# Patient Record
Sex: FEMALE | Race: BLACK OR AFRICAN AMERICAN | NOT HISPANIC OR LATINO | Employment: UNEMPLOYED | URBAN - METROPOLITAN AREA
[De-identification: names, ages, dates, MRNs, and addresses within clinical notes are randomized per-mention and may not be internally consistent; named-entity substitution may affect disease eponyms.]

---

## 2017-04-28 ENCOUNTER — OFFICE VISIT (OUTPATIENT)
Dept: INTERNAL MEDICINE | Facility: CLINIC | Age: 57
End: 2017-04-28
Payer: COMMERCIAL

## 2017-04-28 VITALS
DIASTOLIC BLOOD PRESSURE: 65 MMHG | SYSTOLIC BLOOD PRESSURE: 143 MMHG | HEART RATE: 69 BPM | WEIGHT: 194 LBS | OXYGEN SATURATION: 99 % | HEIGHT: 66 IN | TEMPERATURE: 97 F | BODY MASS INDEX: 31.18 KG/M2

## 2017-04-28 DIAGNOSIS — R73.9 HYPERGLYCEMIA: ICD-10-CM

## 2017-04-28 DIAGNOSIS — E78.5 HYPERLIPIDEMIA, UNSPECIFIED HYPERLIPIDEMIA TYPE: ICD-10-CM

## 2017-04-28 DIAGNOSIS — Z00.00 ROUTINE GENERAL MEDICAL EXAMINATION AT A HEALTH CARE FACILITY: Primary | ICD-10-CM

## 2017-04-28 DIAGNOSIS — E55.9 VITAMIN D DEFICIENCY: ICD-10-CM

## 2017-04-28 PROCEDURE — 1160F RVW MEDS BY RX/DR IN RCRD: CPT | Mod: S$GLB,,, | Performed by: INTERNAL MEDICINE

## 2017-04-28 PROCEDURE — 99999 PR PBB SHADOW E&M-NEW PATIENT-LVL III: CPT | Mod: PBBFAC,,, | Performed by: INTERNAL MEDICINE

## 2017-04-28 PROCEDURE — 99204 OFFICE O/P NEW MOD 45 MIN: CPT | Mod: S$GLB,,, | Performed by: INTERNAL MEDICINE

## 2017-04-28 RX ORDER — SIMVASTATIN 20 MG/1
20 TABLET, FILM COATED ORAL NIGHTLY
COMMUNITY
End: 2017-06-05 | Stop reason: SDUPTHER

## 2017-04-28 RX ORDER — METFORMIN HYDROCHLORIDE 500 MG/1
500 TABLET ORAL
Qty: 60 TABLET | Refills: 11 | Status: SHIPPED | OUTPATIENT
Start: 2017-04-28 | End: 2018-01-31 | Stop reason: SDUPTHER

## 2017-04-28 RX ORDER — METFORMIN HYDROCHLORIDE 500 MG/1
500 TABLET ORAL
COMMUNITY
End: 2017-04-28 | Stop reason: SDUPTHER

## 2017-04-28 NOTE — MR AVS SNAPSHOT
[facility]  [address]  [place] [address]  Phone: [phone]                  Pricilla Bower   2017 10:20 AM   Office Visit    Description:  Female : 1960   Provider:  Ramiro Perez MD   Department:  [facility]           Reason for Visit     Establish Care           Diagnoses this Visit        Comments    Routine general medical examination at a health care facility    -  Primary     Hyperglycemia         Hyperlipidemia, unspecified hyperlipidemia type         Vitamin D deficiency                To Do List           Future Appointments        Provider Department Dept Phone    2017 1:00 PM LABORATORY, [facility]       Goals (5 Years of Data)     None      Follow-Up and Disposition     Return in about 2 months (around 2017), or if symptoms worsen or fail to improve, for FU on breast/PAP.    Follow-up and Disposition History       These Medications        Disp Refills Start End    metformin (GLUCOPHAGE) 500 MG tablet 60 tablet 11 2017     Take 1 tablet (500 mg total) by mouth daily with breakfast. - Oral    [facility] [address] [place], [place] [address] [address] [address]  AT [facility] [address] [address] Ph #: [phone]         [facility]     [facility] Nurse Care Line -  Assistance  Unless otherwise directed by your provider, please contact [facility], our nurse care line that is available for  assistance.     Registered nurses in the [facility] provide: appointment scheduling, clinical advisement, health education, and other advisory services.  Call: [phone] (toll free)               Medications           Message regarding Medications     Verify the changes and/or additions to your medication regime listed below are the same as discussed with your clinician today.  If any of these changes or additions are incorrect, please notify your healthcare provider.       "  START taking these NEW medications        Refills    metformin (GLUCOPHAGE) 500 MG tablet 11    Sig: Take 1 tablet (500 mg total) by mouth daily with breakfast.    Class: Normal    Route: Oral           Verify that the below list of medications is an accurate representation of the medications you are currently taking.  If none reported, the list may be blank. If incorrect, please contact your healthcare provider. Carry this list with you in case of emergency.           Current Medications     metformin (GLUCOPHAGE) 500 MG tablet Take 1 tablet (500 mg total) by mouth daily with breakfast.    simvastatin (ZOCOR) 20 MG tablet Take 20 mg by mouth every evening.           Clinical Reference Information           Your Vitals Were     BP Pulse Temp Height Weight SpO2    143/65 (BP Location: Left arm, Patient Position: Sitting, BP Method: Manual) 69 97.4 °F (36.3 °C) (Tympanic) 5' 6" (1.676 m) 88 kg (194 lb 0.1 oz) 99%    BMI                31.31 kg/m2          Blood Pressure          Most Recent Value    BP  (!)  143/65      Allergies as of 4/28/2017     Darvocet A500 [Propoxyphene N-acetaminophen]      Immunizations Administered on Date of Encounter - 4/28/2017     None      Orders Placed During Today's Visit     Future Labs/Procedures Expected by Expires    CBC auto differential  4/28/2017 6/27/2018    Comprehensive metabolic panel  4/28/2017 6/27/2018    Hemoglobin A1c  4/28/2017 6/27/2018    Hepatitis C antibody  4/28/2017 6/27/2018    Lipid panel  4/28/2017 6/27/2018    T4, free  4/28/2017 6/27/2018    TSH  4/28/2017 6/27/2018    Vitamin D  4/28/2017 6/27/2018      [facility] Sign-Up     Activating your [facility] account is as easy as 1-2-3!     1) Visit [email or web address], select Sign Up Now, enter this activation code and your date of birth, then select Next.  [email or web address]  Expires: 6/12/2017 12:09 PM      2) Create a username and password to use when you visit [facility] in the future and select a security " question in case you lose your password and select Next.    3) Enter your e-mail address and click Sign Up!    Additional Information  If you have questions, please e-mail [email or web address] or call [phone] to talk to our [facility] staff. Remember, [facility] is NOT to be used for urgent needs. For medical emergencies, dial 911.         Language Assistance Services     ATTENTION: Language assistance services are available, free of charge. Please call [phone].      ATENCIÓN: Si habla español, tiene a angeles disposición servicios gratuitos de asistencia lingüística. Llame al [phone].     TEDDY Ý: N?u b?n nói Ti?ng Vi?t, có các d?ch v? h? tr? ngôn ng? mi?n phí dành cho b?n. G?i s? [phone].         [facility] complies with applicable Federal civil rights laws and does not discriminate on the basis of race, color, national origin, age, disability, or sex.

## 2017-04-28 NOTE — PROGRESS NOTES
Subjective:      Patient ID: Pricilla Bower is a 56 y.o. female.    Chief Complaint: Establish Care    HPI  Ms. Bower presents to establish primary care due to being in [place] more than [place].    She reports feeling well.     No problems. She endorses hot flashes, which doesn't bother her. She prefers to avoid HRT.    Past Medical History:   Diagnosis Date    Blood pressure elevated without history of HTN     On low dose BP rx in the past, but was able to stop due to 30 lb weight loss, 20 of which she regained    Hyperglycemia     Obesity (BMI 30.0-34.9)      History reviewed. No pertinent surgical history.    Social History     Social History    Marital status:      Spouse name: N/A    Number of children: N/A    Years of education: N/A     Occupational History    Not on file.     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)    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: 1 plain biscuit w/ grape jelly; Sprite    Lunch: 3p: Salad or ribs from [employer]; Sweetened tea    Dinner: Banana or other fruit; water    Snacks: None    Eats out: 7d/wk    Water: 100 oz/day (started 3 days ago)    PA: None    Goal weight is 170 lbs by 12/31/17         Family History   Problem Relation Age of Onset    Cancer Mother          Current Outpatient Prescriptions:     metformin (GLUCOPHAGE) 500 MG tablet, Take 500 mg by mouth daily with breakfast., Disp: , Rfl:     simvastatin (ZOCOR) 20 MG tablet, Take 20 mg by mouth every evening., Disp: , Rfl:     Review of patient's allergies indicates:   Allergen Reactions    Penicillins      Lab Results   Component Value Date    WBC 5.01 06/15/2006    HGB 12.6  "06/15/2006    HCT 38.1 06/15/2006     06/15/2006    CHOL 232 (H) 06/15/2006    TRIG 182 (H) 06/15/2006    HDL 34.0 (L) 06/15/2006    ALT 44 (H) 08/26/2005    AST 29 08/26/2005     08/26/2005    K 3.7 08/26/2005     08/26/2005    CREATININE 0.9 08/26/2005    BUN 4 (L) 08/26/2005    CO2 24 08/26/2005     BP (!) 143/65 (BP Location: Left arm, Patient Position: Sitting, BP Method: Manual)  Pulse 69  Temp 97.4 °F (36.3 °C) (Tympanic)   Ht 5' 6" (1.676 m)  Wt 88 kg (194 lb 0.1 oz)  SpO2 99%  BMI 31.31 kg/m2    Review of Systems   Constitutional: Negative.   Cardiovascular: Negative.   Respiratory: Negative.   Gastrointestinal: Negative.   Genitourinary: Negative.   Musculoskeletal: Negative.   Derm: Negative.  Allergic/Immunologic: Negative.   Endocrine: Negative.   Hematological: Negative.   Neurological: Negative.   Psychiatric/Behavioral: Negative.     Objective:     Physical Exam  GEN: A&O fully, NAD  PSYC: Normal affect  HEENT: OP: Clear, no LAD, no thyroid masses  CV: RRR, no M/G/R  PULM: CTA bilaterally, no wheezes, rales  GI: S/NT/ND, normal bowel sounds  EXT: No C/C/E  NEURO: CN II-XII intact, 5/5 strength globally, no sensory losses, normal tandem gait, normal Romberg, 2+ DTRs globally  DERM: 0.5 x 1 cm heterogenously pigmented, irregular border, asymetric plaque      Assessment:      1. Routine general medical examination at a health care facility    2. Hyperglycemia    3. Hyperlipidemia, unspecified hyperlipidemia type    4. Vitamin D deficiency      Plan:   Routine general medical examination at a health care facility  -     CBC auto differential; Future; Expected date: 4/28/17  -     Comprehensive metabolic panel; Future; Expected date: 4/28/17  -     T4, free; Future; Expected date: 4/28/17  -     TSH; Future; Expected date: 4/28/17  -     Hepatitis C antibody; Future; Expected date: 4/28/17    Hyperglycemia  -     Hemoglobin A1c; Future; Expected date: 4/28/17    Hyperlipidemia, " unspecified hyperlipidemia type  -     Lipid panel; Future; Expected date: 4/28/17    Vitamin D deficiency  -     Vitamin D; Future; Expected date: 4/28/17    RTC in 2 months for HM (breast/PAP) or sooner as needed.

## 2017-06-05 ENCOUNTER — OFFICE VISIT (OUTPATIENT)
Dept: INTERNAL MEDICINE | Facility: CLINIC | Age: 57
End: 2017-06-05
Payer: COMMERCIAL

## 2017-06-05 ENCOUNTER — LAB VISIT (OUTPATIENT)
Dept: LAB | Facility: HOSPITAL | Age: 57
End: 2017-06-05
Attending: INTERNAL MEDICINE
Payer: COMMERCIAL

## 2017-06-05 VITALS
TEMPERATURE: 99 F | RESPIRATION RATE: 18 BRPM | HEART RATE: 71 BPM | HEIGHT: 66 IN | WEIGHT: 192.88 LBS | OXYGEN SATURATION: 98 % | BODY MASS INDEX: 31 KG/M2 | DIASTOLIC BLOOD PRESSURE: 65 MMHG | SYSTOLIC BLOOD PRESSURE: 138 MMHG

## 2017-06-05 DIAGNOSIS — Z00.00 ROUTINE GENERAL MEDICAL EXAMINATION AT A HEALTH CARE FACILITY: ICD-10-CM

## 2017-06-05 DIAGNOSIS — N93.9 ABNORMAL VAGINAL BLEEDING: Primary | ICD-10-CM

## 2017-06-05 DIAGNOSIS — E78.5 HYPERLIPIDEMIA, UNSPECIFIED HYPERLIPIDEMIA TYPE: ICD-10-CM

## 2017-06-05 DIAGNOSIS — N93.9 ABNORMAL VAGINAL BLEEDING: ICD-10-CM

## 2017-06-05 LAB
25(OH)D3+25(OH)D2 SERPL-MCNC: 16 NG/ML
ALBUMIN SERPL BCP-MCNC: 3.8 G/DL
ALP SERPL-CCNC: 61 U/L
ALT SERPL W/O P-5'-P-CCNC: 25 U/L
ANION GAP SERPL CALC-SCNC: 10 MMOL/L
AST SERPL-CCNC: 18 U/L
BASOPHILS # BLD AUTO: 0.01 K/UL
BASOPHILS NFR BLD: 0.2 %
BILIRUB SERPL-MCNC: 0.3 MG/DL
BUN SERPL-MCNC: 10 MG/DL
CALCIUM SERPL-MCNC: 9.6 MG/DL
CHLORIDE SERPL-SCNC: 103 MMOL/L
CHOLEST/HDLC SERPL: 5.6 {RATIO}
CO2 SERPL-SCNC: 26 MMOL/L
CREAT SERPL-MCNC: 0.9 MG/DL
DIFFERENTIAL METHOD: NORMAL
EOSINOPHIL # BLD AUTO: 0.2 K/UL
EOSINOPHIL NFR BLD: 3 %
ERYTHROCYTE [DISTWIDTH] IN BLOOD BY AUTOMATED COUNT: 13.8 %
EST. GFR  (AFRICAN AMERICAN): >60 ML/MIN/1.73 M^2
EST. GFR  (NON AFRICAN AMERICAN): >60 ML/MIN/1.73 M^2
GLUCOSE SERPL-MCNC: 101 MG/DL
HCT VFR BLD AUTO: 40.1 %
HDL/CHOLESTEROL RATIO: 18 %
HDLC SERPL-MCNC: 222 MG/DL
HDLC SERPL-MCNC: 40 MG/DL
HGB BLD-MCNC: 13.5 G/DL
LDLC SERPL CALC-MCNC: 143.4 MG/DL
LYMPHOCYTES # BLD AUTO: 1.8 K/UL
LYMPHOCYTES NFR BLD: 29.8 %
MCH RBC QN AUTO: 29.9 PG
MCHC RBC AUTO-ENTMCNC: 33.7 %
MCV RBC AUTO: 89 FL
MONOCYTES # BLD AUTO: 0.4 K/UL
MONOCYTES NFR BLD: 6.7 %
NEUTROPHILS # BLD AUTO: 3.6 K/UL
NEUTROPHILS NFR BLD: 60.1 %
NONHDLC SERPL-MCNC: 182 MG/DL
PLATELET # BLD AUTO: 284 K/UL
PMV BLD AUTO: 10.5 FL
POTASSIUM SERPL-SCNC: 3.8 MMOL/L
PROT SERPL-MCNC: 7.7 G/DL
RBC # BLD AUTO: 4.51 M/UL
SODIUM SERPL-SCNC: 139 MMOL/L
TRIGL SERPL-MCNC: 193 MG/DL
TSH SERPL DL<=0.005 MIU/L-ACNC: 1.31 UIU/ML
WBC # BLD AUTO: 5.93 K/UL

## 2017-06-05 PROCEDURE — 85025 COMPLETE CBC W/AUTO DIFF WBC: CPT

## 2017-06-05 PROCEDURE — 80061 LIPID PANEL: CPT

## 2017-06-05 PROCEDURE — 80053 COMPREHEN METABOLIC PANEL: CPT

## 2017-06-05 PROCEDURE — 84443 ASSAY THYROID STIM HORMONE: CPT

## 2017-06-05 PROCEDURE — 88141 CYTOPATH C/V INTERPRET: CPT | Mod: ,,, | Performed by: PATHOLOGY

## 2017-06-05 PROCEDURE — 88175 CYTOPATH C/V AUTO FLUID REDO: CPT | Performed by: PATHOLOGY

## 2017-06-05 PROCEDURE — 82306 VITAMIN D 25 HYDROXY: CPT

## 2017-06-05 PROCEDURE — 86803 HEPATITIS C AB TEST: CPT

## 2017-06-05 PROCEDURE — 99999 PR PBB SHADOW E&M-EST. PATIENT-LVL III: CPT | Mod: PBBFAC,,, | Performed by: INTERNAL MEDICINE

## 2017-06-05 PROCEDURE — 36415 COLL VENOUS BLD VENIPUNCTURE: CPT | Mod: PO

## 2017-06-05 PROCEDURE — 99214 OFFICE O/P EST MOD 30 MIN: CPT | Mod: S$GLB,,, | Performed by: INTERNAL MEDICINE

## 2017-06-05 RX ORDER — SIMVASTATIN 20 MG/1
20 TABLET, FILM COATED ORAL NIGHTLY
Qty: 90 TABLET | Refills: 3 | Status: SHIPPED | OUTPATIENT
Start: 2017-06-05 | End: 2017-09-19 | Stop reason: DRUGHIGH

## 2017-06-05 NOTE — PROGRESS NOTES
Subjective:      Patient ID: Pricilla Bower is a 57 y.o. female.    Chief Complaint: Vaginal Bleeding    Vaginal Bleeding       Ms. Bower is a patient of mine, who presents for vaginal bleeding, which started in May with one episode of spotting, which resolved until last night. She reports having light bleeding since, but had gone through menopause ~53 years. Of note, she reports her mother had vaginal and metastatic breast cancer despite not smoking or drinking.     Past Medical History:   Diagnosis Date    Blood pressure elevated without history of HTN     On low dose BP rx in the past, but was able to stop due to 30 lb weight loss, 20 of which she regained    Hyperglycemia     Obesity (BMI 30.0-34.9)      History reviewed. No pertinent surgical history.  Social History     Social History    Marital status:      Spouse name: N/A    Number of children: N/A    Years of education: N/A     Occupational History    Not on file.     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)    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: 1 plain biscuit w/ grape jelly; Sprite    Lunch: 3p: Salad or ribs from [employer]; Sweetened tea    Dinner: Banana or other fruit; water    Snacks: None    Eats out: 7d/wk    Water: 100 oz/day (started 3 days ago)    PA: None    Goal weight is 170 lbs by 12/31/17         Family History   Problem Relation Age of Onset    Cancer Mother        Current Outpatient Prescriptions:     metformin (GLUCOPHAGE) 500 MG tablet, Take 1 tablet (500 mg total) by mouth daily with breakfast., Disp: 60 tablet, Rfl: 11    simvastatin (ZOCOR) 20 MG tablet, Take 1 tablet (20 mg  "total) by mouth every evening., Disp: 90 tablet, Rfl: 3    Review of patient's allergies indicates:   Allergen Reactions    Darvocet a500 [propoxyphene n-acetaminophen] Nausea And Vomiting       Review of Systems   Genitourinary: Positive for vaginal bleeding.        Objective:     /65 (BP Location: Right arm, Patient Position: Sitting, BP Method: Manual)   Pulse 71   Temp 98.6 °F (37 °C) (Tympanic)   Resp 18   Ht 5' 6" (1.676 m)   Wt 87.5 kg (192 lb 14.4 oz)   SpO2 98%   BMI 31.14 kg/m²     Physical Exam  GEN: A&O fully, NAD  PSYC: Normal affect  BREAST: No axillary LAD, no breast masses, no nipple discharge  : Normal external genitalia, gross blood in vaginal vault, normal appearing cervix, no ovarian masses appreciated    Lab Results   Component Value Date    WBC 5.01 06/15/2006    HGB 12.6 06/15/2006    HCT 38.1 06/15/2006     06/15/2006    CHOL 232 (H) 06/15/2006    TRIG 182 (H) 06/15/2006    HDL 34.0 (L) 06/15/2006    ALT 44 (H) 08/26/2005    AST 29 08/26/2005     08/26/2005    K 3.7 08/26/2005     08/26/2005    CREATININE 0.9 08/26/2005    BUN 4 (L) 08/26/2005    CO2 24 08/26/2005       Assessment:      1. Abnormal vaginal bleeding    2. Routine general medical examination at a health care facility    3. Hyperlipidemia, unspecified hyperlipidemia type      Plan:   Abnormal vaginal bleeding  -     Liquid-based pap smear, screening  -     Ambulatory consult to Obstetrics / Gynecology  -     CBC auto differential; Future; Expected date: 06/05/2017  -     Comprehensive metabolic panel; Future; Expected date: 06/05/2017  -     Lipid panel; Future; Expected date: 06/05/2017  -     TSH; Future; Expected date: 06/05/2017  -     Vitamin D; Future; Expected date: 06/05/2017  -     Hepatitis C antibody; Future; Expected date: 06/05/2017  -     Case request GI: COLONOSCOPY    Routine general medical examination at a health care facility  -     CBC auto differential; Future; Expected " date: 06/05/2017  -     Comprehensive metabolic panel; Future; Expected date: 06/05/2017  -     Lipid panel; Future; Expected date: 06/05/2017  -     TSH; Future; Expected date: 06/05/2017  -     Vitamin D; Future; Expected date: 06/05/2017  -     Hepatitis C antibody; Future; Expected date: 06/05/2017  -     Case request GI: COLONOSCOPY    Hyperlipidemia, unspecified hyperlipidemia type  -     simvastatin (ZOCOR) 20 MG tablet; Take 1 tablet (20 mg total) by mouth every evening.  Dispense: 90 tablet; Refill: 3      RTC in 4 weeks for f/u on vaginal bleeding or sooner as needed

## 2017-06-06 LAB — HCV AB SERPL QL IA: NEGATIVE

## 2017-06-12 ENCOUNTER — OFFICE VISIT (OUTPATIENT)
Dept: OBSTETRICS AND GYNECOLOGY | Facility: CLINIC | Age: 57
End: 2017-06-12
Payer: COMMERCIAL

## 2017-06-12 VITALS
HEIGHT: 67 IN | WEIGHT: 193.56 LBS | BODY MASS INDEX: 30.38 KG/M2 | DIASTOLIC BLOOD PRESSURE: 80 MMHG | SYSTOLIC BLOOD PRESSURE: 140 MMHG

## 2017-06-12 DIAGNOSIS — N93.9 ABNORMAL VAGINAL BLEEDING: ICD-10-CM

## 2017-06-12 DIAGNOSIS — N95.0 POSTMENOPAUSAL BLEEDING: Primary | ICD-10-CM

## 2017-06-12 PROCEDURE — 58100 BIOPSY OF UTERUS LINING: CPT | Mod: S$GLB,,, | Performed by: OBSTETRICS & GYNECOLOGY

## 2017-06-12 PROCEDURE — 99499 UNLISTED E&M SERVICE: CPT | Mod: S$GLB,,, | Performed by: OBSTETRICS & GYNECOLOGY

## 2017-06-12 PROCEDURE — 88305 TISSUE EXAM BY PATHOLOGIST: CPT

## 2017-06-12 PROCEDURE — 88305 TISSUE EXAM BY PATHOLOGIST: CPT | Mod: 26,,,

## 2017-06-12 PROCEDURE — 99999 PR PBB SHADOW E&M-EST. PATIENT-LVL II: CPT | Mod: PBBFAC,,, | Performed by: OBSTETRICS & GYNECOLOGY

## 2017-06-12 NOTE — PROGRESS NOTES
Subjective:       Patient ID: Pricilla Bower is a 57 y.o. female.    Chief Complaint:  Vaginal Bleeding      History of Present Illness  HPI  here for problem   Last gyn visit 3 yrs ago; pap wnl; had pap 2017--but results in process  Menses stopped age 53;   Had bleeding in may for 2 days;;  passing clots for 3 days  Denies pelvic pain  ; denies dyspaurenia; post coital bleeding    GYN & OB History  No LMP recorded. Patient is postmenopausal.   Date of Last Pap: 2017    OB History    Para Term  AB Living   3 2   1    SAB TAB Ectopic Multiple Live Births   1          # Outcome Date GA Lbr Michael/2nd Weight Sex Delivery Anes PTL Lv   3 SAB            2 Para            1 Para                   Review of Systems  Review of Systems   Constitutional: Negative for activity change, appetite change, chills, diaphoresis, fatigue, fever and unexpected weight change.   HENT: Negative for mouth sores and tinnitus.    Eyes: Negative for discharge and visual disturbance.   Respiratory: Negative for cough, shortness of breath and wheezing.    Cardiovascular: Negative for chest pain, palpitations and leg swelling.   Gastrointestinal: Negative for abdominal pain, bloating, blood in stool, constipation, diarrhea, nausea and vomiting.   Endocrine: Negative for diabetes, hair loss, hot flashes, hyperthyroidism and hypothyroidism.   Genitourinary: Positive for postmenopausal bleeding. Negative for decreased libido, dyspareunia, dysuria, flank pain, frequency, genital sores, hematuria, menorrhagia, menstrual problem, pelvic pain, urgency, vaginal bleeding, vaginal discharge, vaginal pain, dysmenorrhea, urinary incontinence, postcoital bleeding and vaginal odor.   Musculoskeletal: Negative for back pain and myalgias.   Skin:  Negative for rash, no acne and hair changes.   Neurological: Negative for seizures, syncope, numbness and headaches.   Hematological: Negative for adenopathy. Does not bruise/bleed  easily.   Psychiatric/Behavioral: Negative for depression and sleep disturbance. The patient is not nervous/anxious.    Breast: Negative for breast mass, breast pain, nipple discharge and skin changes          Objective:    Physical Exam       Assessment:      No diagnosis found.           Plan:      Continue annual well woman exam.

## 2017-06-12 NOTE — PROCEDURES
Procedures   CC: ENDOMETRIAL BIOPSPY    Pricilla Bower is a 57 y.o. female  presents for an endometrial biopsy secondary to post menopausal bleeding  UPT was not done secondary to age.      PRE-ENDOMETRIAL BIOPSY COUNSELING:    The patient was informed of the risk of bleeding, infection, uterine perforation and pain and that the test will rule-out endometrial cancer with accuracy greater than 95%.  She was counseled on the alternatives to endometrial biopsy and agrees to proceed.      TIME OUT PERFORMED.    The cervix was visualized with a speculum.  A single tooth tenaculum was placed on the anterior lip prior to the biopsy.      A sterile endometrial pipelle was passed without difficulty to a depth of 10 cm.    Endometrial tissue was obtained.      The specimen was placed in formalyn and sent to Pathology of histology evaluation.    The patient tolerated the procedure well.      ASSESSMENT AND PLAN  Encounter Diagnosis   Name Primary?    Postmenopausal bleeding Yes       POST ENDOMETRIAL BIOPSY COUNSELING:  Manage post biopsy cramping with NSAIDs or Tylenol.  Expect spotting or light bleeding for a few days.  Report bleeding heavier than a period, fever > 101.0 F, worsening pain or a foul smelling vaginal discharge.      Counseling lasted approximately 15 minutes and all her questions were answered.      FOLLOW-UP:  Pending biopsyCC: ENDOMETRIAL BIOPSPY

## 2017-06-12 NOTE — LETTER
June 12, 2017      Ramiro Perez MD  [address] [address]           [facility]  [address] [place] [address]  Phone: [phone]          Patient: Pricilla Bower   MR Number: [id number]   YOB: 1960   Date of Visit: 6/12/2017       Dear Dr. Ramiro Perez:    Thank you for referring Pricilla Bower to me for evaluation. Attached you will find relevant portions of my assessment and plan of care.    If you have questions, please do not hesitate to call me. I look forward to following Pricilla Bower along with you.    Sincerely,    Josefina Cheung MD    Enclosure  CC:  No Recipients    If you would like to receive this communication electronically, please contact [email or web address] or [phone] to request more information on [employer] access.    For providers and/or their staff who would like to refer a patient to [facility], please contact us through our one-stop-shop provider referral line, [facility], at [phone].    If you feel you have received this communication in error or would no longer like to receive these types of communications, please e-mail [email or web address]

## 2017-06-14 ENCOUNTER — TELEPHONE (OUTPATIENT)
Dept: INTERNAL MEDICINE | Facility: CLINIC | Age: 57
End: 2017-06-14

## 2017-06-14 NOTE — TELEPHONE ENCOUNTER
----- Message from Anil Martinez sent at 6/14/2017  3:20 PM CDT -----  Contact: [phone]  Pt would like lab results. Please call back at [phone]. Thx

## 2017-06-16 ENCOUNTER — PATIENT OUTREACH (OUTPATIENT)
Dept: ADMINISTRATIVE | Facility: HOSPITAL | Age: 57
End: 2017-06-16

## 2017-06-16 NOTE — LETTER
June 16, 2017    Pricilla Bower  [address] [address]             [facility]  [address]  [place] [address]  Phone: [phone] Dear Mrs. Bower:    [facility] is committed to your overall health.  To help you get the most out of each of your visits, we will review your information to make sure you are up to date on all of your recommended tests and/or procedures.      Ramiro Perez MD has found that you may be due for   Health Maintenance Due   Topic    Mammogram     Colonoscopy         If you have had any of the above done at another facility, please bring the records or information with you so that your record at [facility] will be complete.    If you are currently taking medication, please bring it with you to your appointment for review.    We will be happy to assist you with scheduling any necessary appointments or you may contact the [facility] appointment desk at [phone] to schedule at your convenience.     Thank you for choosing [facility] for your healthcare needs.  If you have any questions or concerns, please don't hesitate to call.    Sincerely,  Sherie DOCKERY LPN Care Coordinator  [facility]  [phone]

## 2017-07-22 ENCOUNTER — OFFICE VISIT (OUTPATIENT)
Dept: URGENT CARE | Facility: CLINIC | Age: 57
End: 2017-07-22
Payer: COMMERCIAL

## 2017-07-22 VITALS
TEMPERATURE: 98 F | BODY MASS INDEX: 29.52 KG/M2 | DIASTOLIC BLOOD PRESSURE: 82 MMHG | HEART RATE: 74 BPM | HEIGHT: 67 IN | WEIGHT: 188.06 LBS | SYSTOLIC BLOOD PRESSURE: 164 MMHG | OXYGEN SATURATION: 97 %

## 2017-07-22 DIAGNOSIS — J30.9 ACUTE ALLERGIC RHINITIS, UNSPECIFIED SEASONALITY, UNSPECIFIED TRIGGER: ICD-10-CM

## 2017-07-22 DIAGNOSIS — H60.93 OTITIS EXTERNA OF BOTH EARS, UNSPECIFIED CHRONICITY, UNSPECIFIED TYPE: ICD-10-CM

## 2017-07-22 DIAGNOSIS — R51.9 ACUTE NONINTRACTABLE HEADACHE, UNSPECIFIED HEADACHE TYPE: Primary | ICD-10-CM

## 2017-07-22 LAB — GLUCOSE SERPL-MCNC: 116 MG/DL (ref 70–110)

## 2017-07-22 PROCEDURE — 99999 PR PBB SHADOW E&M-EST. PATIENT-LVL III: CPT | Mod: PBBFAC,,, | Performed by: NURSE PRACTITIONER

## 2017-07-22 PROCEDURE — 99214 OFFICE O/P EST MOD 30 MIN: CPT | Mod: S$GLB,,, | Performed by: NURSE PRACTITIONER

## 2017-07-22 RX ORDER — FLUTICASONE PROPIONATE 50 MCG
2 SPRAY, SUSPENSION (ML) NASAL DAILY
Qty: 16 G | Refills: 0 | Status: SHIPPED | OUTPATIENT
Start: 2017-07-22 | End: 2017-08-21

## 2017-07-22 RX ORDER — NEOMYCIN SULFATE, POLYMYXIN B SULFATE AND HYDROCORTISONE 10; 3.5; 1 MG/ML; MG/ML; [USP'U]/ML
3 SUSPENSION/ DROPS AURICULAR (OTIC) 4 TIMES DAILY
Qty: 10 ML | Refills: 0 | Status: SHIPPED | OUTPATIENT
Start: 2017-07-22 | End: 2017-07-29

## 2017-07-22 RX ORDER — MONTELUKAST SODIUM 10 MG/1
10 TABLET ORAL NIGHTLY
Qty: 30 TABLET | Refills: 0 | Status: SHIPPED | OUTPATIENT
Start: 2017-07-22 | End: 2017-08-21

## 2017-07-22 RX ORDER — BUTALBITAL, ACETAMINOPHEN AND CAFFEINE 50; 325; 40 MG/1; MG/1; MG/1
1 TABLET ORAL EVERY 6 HOURS PRN
Qty: 20 TABLET | Refills: 0 | Status: SHIPPED | OUTPATIENT
Start: 2017-07-22 | End: 2020-08-07

## 2017-07-22 NOTE — PATIENT INSTRUCTIONS
"Follow up with Dr. Perez this week.  Eat a low sodium diet and avoid caffeine.  Fioricet causes drowsiness, so do not drive, drink alcohol, or take any other potentially sedating medications while you are taking Fioricet.  To ER for worsening headache that doesn't improve with medication, sudden onset of vision changes, slurred speech, facial drooping, chest pain, shortness of breath, confusion, or for any weakness, numbness, or tingling of face or extremities.    Headache, Unspecified    A number of things can cause headaches. The cause of your headache isnt clear. But it doesnt seem to be a sign of any serious illness.  You could have a tension headache or a migraine headache.  Stress can cause a tension headache. This can happen if you tense the muscles of your shoulders, neck, and scalp without knowing it. If this stress lasts long enough, you may develop a tension headache.  It is not clear why migraines occur, but certain things called" triggers" can raise the risk of having a migraine attack. Migraine triggers may include emotional stress or depression, or by hormone changes during the menstrual cycle. Other triggers include birth control pills and other medicines, alcohol or caffeine, foods with tyramine (such as aged cheese, wine), eyestrain, weather changes, missed meals, and lack of sleep or oversleeping.  Other causes of headache include:  · Viral illness with high fever  · Head injury with concussion  · Sinus, ear, or throat infection  · Dental pain and jaw joint (TMJ) pain  More serious but less common causes of headache include stroke, brain hemorrhage, brain tumor, meningitis, and encephalitis.  Home care  Follow these tips when taking care of yourself at home:  · Dont drive yourself home if you were given pain medicine for your headache. Instead, have someone else drive you home. Try to sleep when you get home. You should feel much better when you wake up.  · Apply heat to the back of your neck " to ease a neck muscle spasm. Take care of a migraine headache by putting an ice pack on your forehead or at the base of your skull.  · If you have nausea or vomiting, eat a light diet until your headache eases.  · If you have a migraine headache, use sunglasses when in the daylight or around bright indoor lighting until your symptoms get better. Bright glaring light can make this type of headache worse.  Follow-up care  Follow up with your healthcare provider, or as advised. Talk with your provider if you have frequent headaches. He or she can help figure out a treatment plan. By knowing the earliest signs of headache, and starting treatment right away, you may be able to stop the pain yourself.  When to seek medical advice  Call your healthcare provider right away if any of these occur:  · Your head pain suddenly gets worse after sexual intercourse or strenuous activity  · Your head pain doesnt get better within 24 hours  · You arent able to keep liquids down (repeated vomiting)  · Fever of 100.4ºF (38ºC) or higher, or as directed by your healthcare provider  · Stiff neck  · Extreme drowsiness, confusion, or fainting  · Dizziness or dizziness with spinning sensation (vertigo)  · Weakness in an arm or leg or one side of your face  · You have trouble talking or seeing  Date Last Reviewed: 8/1/2016  © 7309-8499 [employer]. [address] [place] [address], [place] [id number]. All rights reserved. This information is not intended as a substitute for professional medical care. Always follow your healthcare professional's instructions.        Allergic Rhinitis  Allergic rhinitis is an allergic reaction that affects the nose, and often the eyes. Its often known as nasal allergies. Nasal allergies are often due to things in the environment that are breathed in. Depending what you are sensitive to, nasal allergies may occur only during certain seasons. Or they may occur year round. Common indoor allergens include  house dust mites, mold, cockroaches, and pet dander. Outdoor allergens include pollen from trees, grasses, and weeds.   Symptoms include a drippy, stuffy, and itchy nose. They also include sneezing and red and itchy eyes. You may feel tired more often. Severe allergies may also affect your breathing and trigger a condition called asthma.   Tests can be done to see what allergens are affecting you. You may be referred to an allergy specialist for testing and further evaluation.  Home care  The healthcare provider may prescribe medicines to help relieve allergy symptoms.   Ask the provider for advice on how to avoid substances that you are allergic to. Below are a few tips for each type of allergen.  Pet dander:  · Do not have pets with fur and feathers.  · If you cannot avoid having a pet, keep it out of your bedroom and off upholstered furniture.  Pollen:  · When pollen counts are high, keep windows of your car and home closed. If possible, use an air conditioner instead.  · Wear a filter mask when mowing or doing yard work.  House dust mites:  · Wash bedding every week in warm water and detergent and dry on a hot setting.  · Cover the mattress, box spring, and pillows with allergy covers.   · If possible, sleep in a room with no carpet, curtains, or upholstered furniture.  Cockroaches:  · Store food in sealed containers.  · Remove garbage from the home promptly.  · Fix water leaks  Mold:  · Keep humidity low by using a dehumidifier or air conditioner. Keep the dehumidifier and air conditioner clean and free of mold.  · Clean moldy areas with bleach and water.  In general:  · Vacuum once or twice a week. If possible, use a vacuum with a high-efficiency particulate air (HEPA) filter.  · Do not smoke. Avoid cigarette smoke. Cigarette smoke is an irritant that can make symptoms worse.  Follow-up care  Follow up as advised by the health care provider or our staff. If you were referred to an allergy specialist, make this  appointment promptly.  When to seek medical advice  Call your healthcare provider right away if the following occur:  · Coughing or wheezing  · Fever greater than 100.4°F (38°C)  · Continuing symptoms, new symptoms, or worsening symptoms  Call 911 right away if you have:  · Trouble breathing  · Hives (raised red bumps)  · Severe swelling of the face or severe itching of the eyes or mouth  Date Last Reviewed: 4/26/2015  © 5082-9599 [employer]. [address] [place] [address], [place] [address]. All rights reserved. This information is not intended as a substitute for professional medical care. Always follow your healthcare professional's instructions.

## 2017-07-22 NOTE — PROGRESS NOTES
"Subjective:      Patient ID: Pricilla Bower is a 57 y.o. female.    Chief Complaint: Headache    Headache    This is a new problem. The current episode started in the past 7 days. The problem occurs constantly. The problem has been unchanged. The pain is located in the frontal region. The pain does not radiate. The pain quality is similar to prior headaches. The quality of the pain is described as aching and dull. The pain is moderate. Associated symptoms include sinus pressure. Pertinent negatives include no abdominal pain, abnormal behavior, blurred vision, dizziness, drainage, ear pain (ears have been itching), eye pain, facial sweating, fever, hearing loss, loss of balance, nausea, numbness, phonophobia, photophobia, rhinorrhea, sore throat, swollen glands, tinnitus, visual change, vomiting or weakness. Nothing aggravates the symptoms. She has tried NSAIDs for the symptoms. The treatment provided moderate relief. Her past medical history is significant for hypertension (not taking meds as BP has been well-controlled with diet).     Review of Systems   Constitutional: Negative.  Negative for appetite change, chills and fever.   HENT: Positive for congestion and sinus pressure. Negative for ear pain (ears have been itching), hearing loss, rhinorrhea, sore throat and tinnitus.    Eyes: Negative for blurred vision, photophobia and pain.   Respiratory: Negative.    Cardiovascular: Negative.    Gastrointestinal: Negative for abdominal pain, nausea and vomiting.   Endocrine: Negative.    Genitourinary: Negative.    Musculoskeletal: Negative.    Skin: Negative.    Neurological: Positive for headaches. Negative for dizziness, weakness, numbness and loss of balance.   Hematological: Negative.    Psychiatric/Behavioral: Negative.        Objective:   BP (!) 164/82 (BP Location: Right arm, Patient Position: Sitting, BP Method: Manual)   Pulse 74   Temp 97.7 °F (36.5 °C) (Tympanic)   Ht 5' 6.53" (1.69 m)   Wt 85.3 kg " (188 lb 0.8 oz)   SpO2 97%   BMI 29.87 kg/m²   Physical Exam   Constitutional: She is oriented to person, place, and time. She appears well-developed and well-nourished. No distress.   HENT:   Head: Normocephalic and atraumatic.   Right Ear: Tympanic membrane normal.   Left Ear: Tympanic membrane normal.   Nose: Mucosal edema and sinus tenderness present.   Mouth/Throat: Uvula is midline, oropharynx is clear and moist and mucous membranes are normal.   Bilateral canals erythematous with some scaling.    Eyes: EOM are normal. Pupils are equal, round, and reactive to light.   Neck: Normal range of motion. Neck supple.   Cardiovascular: Regular rhythm and normal heart sounds.    Pulmonary/Chest: Effort normal and breath sounds normal. No respiratory distress.   Musculoskeletal: Normal range of motion.   Lymphadenopathy:     She has no cervical adenopathy.   Neurological: She is alert and oriented to person, place, and time. She has normal strength. No cranial nerve deficit or sensory deficit. Coordination and gait normal. GCS eye subscore is 4. GCS verbal subscore is 5. GCS motor subscore is 6.   Skin: Skin is warm, dry and intact. She is not diaphoretic.   Nursing note and vitals reviewed.    Assessment:      1. Acute nonintractable headache, unspecified headache type    2. Acute allergic rhinitis, unspecified seasonality, unspecified trigger    3. Otitis externa of both ears, unspecified chronicity, unspecified type       Plan:   Acute nonintractable headache, unspecified headache type  -     POCT glucose  -     butalbital-acetaminophen-caffeine -40 mg (FIORICET, ESGIC) -40 mg per tablet; Take 1 tablet by mouth every 6 (six) hours as needed for Headaches.  Dispense: 20 tablet; Refill: 0    Acute allergic rhinitis, unspecified seasonality, unspecified trigger  -     montelukast (SINGULAIR) 10 mg tablet; Take 1 tablet (10 mg total) by mouth every evening.  Dispense: 30 tablet; Refill: 0  -     fluticasone  (FLONASE) 50 mcg/actuation nasal spray; 2 sprays by Each Nare route once daily.  Dispense: 16 g; Refill: 0    Otitis externa of both ears, unspecified chronicity, unspecified type  -     neomycin-polymyxin-hydrocortisone (CORTISPORIN) 3.5-10,000-1 mg/mL-unit/mL-% otic suspension; Place 3 drops into both ears 4 (four) times daily.  Dispense: 10 mL; Refill: 0    We do not have Toradol injections available in clinic today. Will Rx Fioricet instead (advised not to take Fioricet until she is home for the day as this medication causes drowsiness).  Low salt diet.  Avoid caffeine.  No OTC decongestants.  Appointment scheduled with Dr. Perez this week for BP recheck and headache follow up.  Instructions, follow up, and supportive care as per AVS.

## 2017-07-27 ENCOUNTER — OFFICE VISIT (OUTPATIENT)
Dept: URGENT CARE | Facility: CLINIC | Age: 57
End: 2017-07-27
Payer: COMMERCIAL

## 2017-07-27 VITALS
DIASTOLIC BLOOD PRESSURE: 78 MMHG | WEIGHT: 187.19 LBS | HEART RATE: 69 BPM | TEMPERATURE: 96 F | BODY MASS INDEX: 29.73 KG/M2 | SYSTOLIC BLOOD PRESSURE: 181 MMHG

## 2017-07-27 DIAGNOSIS — M79.644 PAIN IN FINGER OF RIGHT HAND: Primary | ICD-10-CM

## 2017-07-27 DIAGNOSIS — R03.0 ELEVATED BLOOD PRESSURE READING: ICD-10-CM

## 2017-07-27 DIAGNOSIS — L02.511 ABSCESS OF RIGHT RING FINGER: ICD-10-CM

## 2017-07-27 PROCEDURE — 96372 THER/PROPH/DIAG INJ SC/IM: CPT | Mod: S$GLB,,, | Performed by: NURSE PRACTITIONER

## 2017-07-27 PROCEDURE — 87184 SC STD DISK METHOD PER PLATE: CPT

## 2017-07-27 PROCEDURE — 87077 CULTURE AEROBIC IDENTIFY: CPT

## 2017-07-27 PROCEDURE — 87186 SC STD MICRODIL/AGAR DIL: CPT

## 2017-07-27 PROCEDURE — 99999 PR PBB SHADOW E&M-EST. PATIENT-LVL IV: CPT | Mod: PBBFAC,,, | Performed by: NURSE PRACTITIONER

## 2017-07-27 PROCEDURE — 87070 CULTURE OTHR SPECIMN AEROBIC: CPT

## 2017-07-27 PROCEDURE — 99214 OFFICE O/P EST MOD 30 MIN: CPT | Mod: 25,S$GLB,, | Performed by: NURSE PRACTITIONER

## 2017-07-27 RX ORDER — CLINDAMYCIN HYDROCHLORIDE 300 MG/1
300 CAPSULE ORAL 3 TIMES DAILY
Qty: 30 CAPSULE | Refills: 0 | Status: SHIPPED | OUTPATIENT
Start: 2017-07-27 | End: 2017-08-04

## 2017-07-27 RX ORDER — IBUPROFEN 200 MG
600 TABLET ORAL
Status: COMPLETED | OUTPATIENT
Start: 2017-07-27 | End: 2017-07-27

## 2017-07-27 RX ORDER — HYDROCODONE BITARTRATE AND ACETAMINOPHEN 5; 325 MG/1; MG/1
1 TABLET ORAL
Qty: 15 TABLET | Refills: 0 | Status: SHIPPED | OUTPATIENT
Start: 2017-07-27 | End: 2017-09-19 | Stop reason: SINTOL

## 2017-07-27 RX ORDER — CEFTRIAXONE 1 G/1
1 INJECTION, POWDER, FOR SOLUTION INTRAMUSCULAR; INTRAVENOUS
Status: COMPLETED | OUTPATIENT
Start: 2017-07-27 | End: 2017-07-27

## 2017-07-27 RX ORDER — MUPIROCIN 20 MG/G
OINTMENT TOPICAL 3 TIMES DAILY
Qty: 1 TUBE | Refills: 0 | Status: SHIPPED | OUTPATIENT
Start: 2017-07-27 | End: 2017-08-10

## 2017-07-27 RX ORDER — LIDOCAINE HYDROCHLORIDE 10 MG/ML
1.5 INJECTION INFILTRATION; PERINEURAL
Status: DISCONTINUED | OUTPATIENT
Start: 2017-07-27 | End: 2017-07-27

## 2017-07-27 RX ORDER — LIDOCAINE HYDROCHLORIDE 10 MG/ML
1.5 INJECTION, SOLUTION EPIDURAL; INFILTRATION; INTRACAUDAL; PERINEURAL
Status: DISCONTINUED | OUTPATIENT
Start: 2017-07-27 | End: 2017-07-27

## 2017-07-27 RX ADMIN — Medication 600 MG: at 03:07

## 2017-07-27 RX ADMIN — CEFTRIAXONE 1 G: 1 INJECTION, POWDER, FOR SOLUTION INTRAMUSCULAR; INTRAVENOUS at 02:07

## 2017-07-27 NOTE — PATIENT INSTRUCTIONS
Abscess (Incision & Drainage)  An abscess (sometimes called a boil) occurs when bacteria get trapped under the skin and start to grow. Pus forms inside the abscess as the body responds to the bacteria. An abscess can happen with an insect bite, ingrown hair, blocked oil gland, pimple, cyst, or puncture wound.  Your healthcare provider has drained the pus from your abscess. If the abscess pocket was large, your healthcare provider may have inserted gauze packing. Your provider will need to remove and possibly replace it on your next visit. You may not need antibiotics to treat a simple abscess, unless the infection is spreading into the skin around the wound (cellulitis).  Healing of the wound will take about 1 to 2 weeks, depending on the size of the abscess. Healthy tissue will grow from the bottom and sides of the opening until it seals over.  Home care  These tips can help your wound heal:  · The wound may drain for the first 2 days. Cover the wound with a clean dry dressing. If the dressing becomes soaked with blood or pus, change it.  · If a gauze packing was placed inside the abscess cavity, you may be told to remove it yourself. You may do this in the shower. Once the packing is removed, you should wash the area in the shower or bath 3 to 4 times a day, until the skin opening has closed. Make sure you wash your hands after changing the packing or cleaning the wound.  · If you were prescribed antibiotics, take them as directed until they are all gone.  · You may use acetaminophen or ibuprofen to control pain, unless another pain medicine was prescribed. If you have liver disease or ever had a stomach ulcer, talk with your doctor before using these medicines.  Follow-up care  Follow up with your healthcare provider, or as advised. If a gauze packing was inserted in your wound, it should be removed in 1 to 2 days. Check your wound every day for the signs of worsening infection listed below.  When to seek  medical advice  Call your healthcare provider right away if any of these occur:  · Increasing redness or swelling  · Red streaks in the skin leading away from the wound  · Increasing local pain or swelling  · Continued pus draining from the wound 2 days after treatment  · Fever of 100.4ºF (38ºC) or higher, or as directed by your healthcare provider  · Boil returns when you are at home  Date Last Reviewed: 9/1/2016  © 1336-8236 [employer]. [address] [place] [address]. All rights reserved. This information is not intended as a substitute for professional medical care. Always follow your healthcare professional's instructions.

## 2017-07-30 ENCOUNTER — TELEPHONE (OUTPATIENT)
Dept: URGENT CARE | Facility: CLINIC | Age: 57
End: 2017-07-30

## 2017-07-30 NOTE — TELEPHONE ENCOUNTER
Attempted to call patient regarding preliminary culture report and to follow up regarding finger.  No answer and unable to leave voice message.  Has follow up scheduled for tomorrow with PCP.

## 2017-07-31 ENCOUNTER — TELEPHONE (OUTPATIENT)
Dept: URGENT CARE | Facility: CLINIC | Age: 57
End: 2017-07-31

## 2017-07-31 LAB — BACTERIA SPEC AEROBE CULT: NORMAL

## 2017-07-31 NOTE — TELEPHONE ENCOUNTER
Attempted to call patient today.  No answer and unable to leave voice message.  Left a message on 's voicemail to have patient return my call.  Trying to follow up regarding finger/abscess and inform of culture report.  May need to change antibiotics.  Patient missed follow up appointment with PCP.

## 2017-08-04 ENCOUNTER — OFFICE VISIT (OUTPATIENT)
Dept: URGENT CARE | Facility: CLINIC | Age: 57
End: 2017-08-04
Payer: COMMERCIAL

## 2017-08-04 VITALS
SYSTOLIC BLOOD PRESSURE: 137 MMHG | HEART RATE: 79 BPM | DIASTOLIC BLOOD PRESSURE: 76 MMHG | BODY MASS INDEX: 31.45 KG/M2 | HEIGHT: 66 IN | OXYGEN SATURATION: 98 % | WEIGHT: 195.69 LBS | TEMPERATURE: 98 F

## 2017-08-04 DIAGNOSIS — L03.011 PARONYCHIA OF RIGHT RING FINGER: Primary | ICD-10-CM

## 2017-08-04 PROCEDURE — 99214 OFFICE O/P EST MOD 30 MIN: CPT | Mod: S$GLB,,, | Performed by: NURSE PRACTITIONER

## 2017-08-04 PROCEDURE — 3008F BODY MASS INDEX DOCD: CPT | Mod: S$GLB,,, | Performed by: NURSE PRACTITIONER

## 2017-08-04 PROCEDURE — 99999 PR PBB SHADOW E&M-EST. PATIENT-LVL IV: CPT | Mod: PBBFAC,,, | Performed by: NURSE PRACTITIONER

## 2017-08-04 RX ORDER — SULFAMETHOXAZOLE AND TRIMETHOPRIM 800; 160 MG/1; MG/1
1 TABLET ORAL 2 TIMES DAILY
Qty: 14 TABLET | Refills: 0 | Status: SHIPPED | OUTPATIENT
Start: 2017-08-04 | End: 2017-08-11

## 2017-08-04 RX ORDER — FLUCONAZOLE 150 MG/1
150 TABLET ORAL DAILY
Qty: 2 TABLET | Refills: 0 | Status: SHIPPED | OUTPATIENT
Start: 2017-08-04 | End: 2017-08-05

## 2017-08-04 NOTE — PATIENT INSTRUCTIONS
Paronychia of the Finger or Toe  Paronychia is an infection near a fingernail or toenail. It usually occurs when an opening in the cuticle or an ingrown toenail lets bacteria under the skin.  The infection will need to be drained if pus is present. If the infection has been caught early, you may need only antibiotic treatment. Healing will take about 1 to 2 weeks.  Home care  Follow these guidelines when caring for yourself at home:  · Clean and soak the toe or finger. Do this 2 times a day for the first 3 days. To do so:  ¨ Soak your foot or hand in a tub of warm water for 5 minutes. Or hold your toe or finger under a faucet of warm running water for 5 minutes.  ¨ Clean any crust away with soap and water using a cotton swab.  ¨ Put antibiotic ointment on the infected area.  · Change the dressing daily or any time it gets dirty.  · If you were given antibiotics, take them as directed until they are all gone.  · If your infection is on a toe, wear comfortable shoes with a lot of toe room. You can also wear open-toed sandals while your toe heals.  · You may use over-the-counter medicine (acetaminophen or ibuprofen to help with pain, unless another medicine was prescribed. If you have chronic liver or kidney disease, talk with your healthcare provider before using these medicines. Also talk with your provider if you've had a stomach ulcer or GI (gastrointestinal) bleeding.  Prevention  The following can prevent paronychia:  · Avoid cutting or playing with your cuticles at home.  · Don't bite your nails.  · Don't suck on your thumbs or fingers.  Follow-up care  Follow up with your healthcare provider, or as advised.  When to seek medical advice  Call your healthcare provider right away if any of these occur:  · Redness, pain, or swelling of the finger or toe gets worse  · Red streaks in the skin leading away from the wound  · Pus or fluid draining from the nail area  · Fever of 100.4ºF (38ºC) or higher, or as directed  by your provider  Date Last Reviewed: 8/1/2016 © 2000-2016 [employer]. [address] [place] [address], [place] [address]. All rights reserved. This information is not intended as a substitute for professional medical care. Always follow your healthcare professional's instructions..    Wound Care:  Use Dial antibacterial Soap daily.  Bactroban ointment to affected area with Q-tip twice daily x 7 days.  Apply heat to area every 2 hours to promote drainage and healing.  Take all antibiotics for full course.  To prevent spread of infection to others in household, cleanse tub/shower with bleach solution.  If the area of redness spreads, you develop fever 100.4 or greater, swelling or pain worsens, contact PCP or go to ER immediately.

## 2017-08-04 NOTE — LETTER
August 4, 2017      [facility]  [address]  [place] [address]  Phone: [phone]       Patient: Pricilla Bower   YOB: 1960  Date of Visit: 08/04/2017    To Whom It May Concern:    Pricilla Castro was at [facility] on 08/04/2017. She may return to work/school on 08/07/17 with no restrictions. If you have any questions or concerns, or if I can be of further assistance, please do not hesitate to contact me.    Sincerely,      DAMIEN Steven

## 2017-08-04 NOTE — PROGRESS NOTES
Subjective:       Patient ID: Pricilla Bower is a 57 y.o. female.    Chief Complaint: Hand Pain and Foot Pain    57 year old white female presents to Urgent Care with reports of right 4th finger pain and swelling that has been present for about 2 weeks. According to patient symptoms have improved but still has swelling and tenderness.       Abscess   Chronicity:  New  Onset:  7 days or greater  Progression Since Onset: gradually improving  Associated Symptoms: no fever, no chills  Characteristics: painful and redness    Pain Scale:  3/10  Treatments Tried:  Draining/squeezing, oral antibiotics and topical antibiotics  Relieved by:  Nothing  Worsened by:  Nothing    Review of Systems   Constitutional: Negative for appetite change, chills and fever.   HENT: Negative for ear pain, sinus pressure, sore throat and trouble swallowing.    Eyes: Negative for visual disturbance.   Respiratory: Negative for shortness of breath.    Cardiovascular: Negative for chest pain.   Gastrointestinal: Negative for abdominal pain, diarrhea, nausea and vomiting.   Endocrine: Negative for cold intolerance, polyphagia and polyuria.   Genitourinary: Negative for decreased urine volume and dysuria.   Musculoskeletal: Negative for back pain.   Skin: Negative for rash.   Allergic/Immunologic: Negative for environmental allergies and food allergies.   Neurological: Negative for dizziness, tremors, weakness and numbness.   Hematological: Does not bruise/bleed easily.   Psychiatric/Behavioral: Negative for confusion and hallucinations. The patient is not nervous/anxious and is not hyperactive.    All other systems reviewed and are negative.      Objective:     Physical Exam   Constitutional: She is oriented to person, place, and time. She appears well-developed and well-nourished.   HENT:   Head: Normocephalic and atraumatic.   Right Ear: External ear normal.   Left Ear: External ear normal.   Nose: Nose normal.   Mouth/Throat: Oropharynx is  clear and moist.   Eyes: Conjunctivae and EOM are normal. Pupils are equal, round, and reactive to light.   Neck: Normal range of motion. Neck supple.   Cardiovascular: Normal rate, regular rhythm, normal heart sounds and intact distal pulses.    No murmur heard.  Pulmonary/Chest: Effort normal and breath sounds normal. She has no wheezes.   Abdominal: Soft. Bowel sounds are normal. There is no tenderness.   Musculoskeletal: Normal range of motion.   Neurological: She is alert and oriented to person, place, and time. She has normal reflexes.   Skin: Skin is warm and dry. No rash noted.        Psychiatric: She has a normal mood and affect. Her behavior is normal. Judgment and thought content normal.   Nursing note and vitals reviewed.    Assessment:     1. Paronychia of right ring finger      Plan:   Pricilla was seen today for hand pain and foot pain.    Diagnoses and all orders for this visit:    Paronychia of right ring finger    Other orders  -     sulfamethoxazole-trimethoprim 800-160mg (BACTRIM DS) 800-160 mg Tab; Take 1 tablet by mouth 2 (two) times daily.  -     fluconazole (DIFLUCAN) 150 MG Tab; Take 1 tablet (150 mg total) by mouth once daily. May repeat in 72 hours if symptoms not resolved.    Instructed patient to follow prescribed treatment plan as directed and recommended follow up with PCP within 1 week or sooner if needed.

## 2017-09-19 ENCOUNTER — APPOINTMENT (OUTPATIENT)
Dept: RADIOLOGY | Facility: HOSPITAL | Age: 57
End: 2017-09-19
Attending: INTERNAL MEDICINE
Payer: COMMERCIAL

## 2017-09-19 ENCOUNTER — OFFICE VISIT (OUTPATIENT)
Dept: INTERNAL MEDICINE | Facility: CLINIC | Age: 57
End: 2017-09-19
Payer: COMMERCIAL

## 2017-09-19 VITALS
HEART RATE: 80 BPM | BODY MASS INDEX: 31.46 KG/M2 | SYSTOLIC BLOOD PRESSURE: 160 MMHG | DIASTOLIC BLOOD PRESSURE: 74 MMHG | WEIGHT: 195.75 LBS | HEIGHT: 66 IN | RESPIRATION RATE: 18 BRPM | TEMPERATURE: 98 F | OXYGEN SATURATION: 96 %

## 2017-09-19 DIAGNOSIS — M79.672 BILATERAL FOOT PAIN: ICD-10-CM

## 2017-09-19 DIAGNOSIS — M79.672 BILATERAL FOOT PAIN: Primary | ICD-10-CM

## 2017-09-19 DIAGNOSIS — Z71.89 COUNSELING ON HEALTH PROMOTION AND DISEASE PREVENTION: ICD-10-CM

## 2017-09-19 DIAGNOSIS — E78.2 MIXED HYPERLIPIDEMIA: ICD-10-CM

## 2017-09-19 DIAGNOSIS — M79.671 BILATERAL FOOT PAIN: Primary | ICD-10-CM

## 2017-09-19 DIAGNOSIS — M72.2 PLANTAR FASCIITIS, BILATERAL: ICD-10-CM

## 2017-09-19 DIAGNOSIS — M79.671 BILATERAL FOOT PAIN: ICD-10-CM

## 2017-09-19 DIAGNOSIS — E11.00 TYPE 2 DIABETES MELLITUS WITH HYPEROSMOLARITY WITHOUT COMA, WITHOUT LONG-TERM CURRENT USE OF INSULIN: ICD-10-CM

## 2017-09-19 PROCEDURE — 73630 X-RAY EXAM OF FOOT: CPT | Mod: 26,50,, | Performed by: RADIOLOGY

## 2017-09-19 PROCEDURE — 73630 X-RAY EXAM OF FOOT: CPT | Mod: 50,TC,PO

## 2017-09-19 PROCEDURE — 99999 PR PBB SHADOW E&M-EST. PATIENT-LVL IV: CPT | Mod: PBBFAC,,, | Performed by: INTERNAL MEDICINE

## 2017-09-19 PROCEDURE — 3008F BODY MASS INDEX DOCD: CPT | Mod: S$GLB,,, | Performed by: INTERNAL MEDICINE

## 2017-09-19 PROCEDURE — 99214 OFFICE O/P EST MOD 30 MIN: CPT | Mod: S$GLB,,, | Performed by: INTERNAL MEDICINE

## 2017-09-19 RX ORDER — DICLOFENAC SODIUM 10 MG/G
2 GEL TOPICAL 4 TIMES DAILY
Qty: 100 G | Refills: 1 | Status: SHIPPED | OUTPATIENT
Start: 2017-09-19 | End: 2019-03-05 | Stop reason: SDUPTHER

## 2017-09-19 RX ORDER — ATORVASTATIN CALCIUM 20 MG/1
20 TABLET, FILM COATED ORAL NIGHTLY
Qty: 30 TABLET | Refills: 11 | Status: SHIPPED | OUTPATIENT
Start: 2017-09-19 | End: 2017-11-02 | Stop reason: SDUPTHER

## 2017-09-19 NOTE — PROGRESS NOTES
"Subjective:      Patient ID: Pricilla Bower is a 57 y.o. female.    Chief Complaint: Foot Pain (right**)      HPI  Ms. Bower is a patient of mine, who presents for right foot pain, which started on 9/8/17 intermittently, 5/10 intensity, but was controlled with Tylenol and Advil. Over the past 1 week, it has become constant, 10/10 intensity and now that she has been favoring her left side, now her left foot is also hurting. When she walks, she has the sensation of her right knee wanting to "give out". 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 She prefers not to take chronic ibuprofen due to the effects on her stomach.    Past Medical History:   Diagnosis Date    Blood pressure elevated without history of HTN     On low dose BP rx in the past, but was able to stop due to 30 lb weight loss, 20 of which she regained    Diabetes mellitus     Diabetes mellitus, type 2     Hyperglycemia     Hyperlipidemia     Hypertension     Obesity (BMI 30.0-34.9)      Past Surgical History:   Procedure Laterality Date    NO PAST SURGERIES       Social History     Social History    Marital status:      Spouse name: N/A    Number of children: N/A    Years of education: N/A     Occupational History    Not on file.     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[employer], furniture store, [employer]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 [employer] full-time)    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: 1 plain biscuit w/ grape jelly; Sprite    Lunch: 3p: Salad or ribs from [employer]; Sweetened tea    Dinner: Banana or other fruit; water    Snacks: None    Eats out: 7d/wk    Water: 100 oz/day (started 3 days ago)    PA: None    " "Goal weight is 170 lbs by 12/31/17         Family History   Problem Relation Age of Onset    Cancer Mother      cervical    Breast cancer Mother        Current Outpatient Prescriptions:     butalbital-acetaminophen-caffeine -40 mg (FIORICET, ESGIC) -40 mg per tablet, Take 1 tablet by mouth every 6 (six) hours as needed for Headaches., Disp: 20 tablet, Rfl: 0    metformin (GLUCOPHAGE) 500 MG tablet, Take 1 tablet (500 mg total) by mouth daily with breakfast., Disp: 60 tablet, Rfl: 11    atorvastatin (LIPITOR) 20 MG tablet, Take 1 tablet (20 mg total) by mouth every evening., Disp: 30 tablet, Rfl: 11    diclofenac sodium (VOLTAREN) 1 % Gel, Apply 2 g topically 4 (four) times daily., Disp: 100 g, Rfl: 1    Review of patient's allergies indicates:   Allergen Reactions    Darvocet a500 [propoxyphene n-acetaminophen] Nausea And Vomiting     Review of Systems   Constitutional: Negative.   Cardiovascular: Negative.   Respiratory: Negative.       Objective:     BP (!) 160/74 (BP Location: Left arm, Patient Position: Sitting, BP Method: Medium (Automatic))   Pulse 80   Temp 97.8 °F (36.6 °C) (Tympanic)   Resp 18   Ht 5' 6" (1.676 m)   Wt 88.8 kg (195 lb 12.3 oz)   SpO2 96%   BMI 31.60 kg/m²     Physical Exam  GEN: A&O fully, NAD  PSYC: Normal affect  MSK: TTP of R>L foot, particularly in plantar and medial ankle regions    Lab Results   Component Value Date    WBC 5.93 06/05/2017    HGB 13.5 06/05/2017    HCT 40.1 06/05/2017     06/05/2017    CHOL 222 (H) 06/05/2017    TRIG 193 (H) 06/05/2017    HDL 40 06/05/2017    ALT 25 06/05/2017    AST 18 06/05/2017     06/05/2017    K 3.8 06/05/2017     06/05/2017    CREATININE 0.9 06/05/2017    BUN 10 06/05/2017    CO2 26 06/05/2017    TSH 1.306 06/05/2017       Assessment:      1. Bilateral foot pain: DDx includes gout and plantar fasciitis. Less likely fx given absence of trauma and bilateral nature. Will check x-ray for signs of plantar " fasciitis or other OA. Will tx with Voltaren    2. Mixed hyperlipidemia: Well controlled on simvastatin. She experienced sleepiness with atorvastatin in the past, which she is amenable to retrying if it can be taken at night.    3.      DM2: This dx came to my attention today after inquiry as to why she was prescribed metformin by her            Previous PCP. She reports having a random glucose >200 mg/dL on 2 separate occasions. Will also            refer to ophthalmology and change simvastatin to atorvastatin 20 qd with the plan to increase dose to            40 QHS.     Plan:   Bilateral foot pain  -     Ambulatory consult to Podiatry  -     Uric acid; Future; Expected date: 03/18/2018  -     X-Ray Foot AP Bilateral; Future; Expected date: 09/19/2017  -     diclofenac sodium (VOLTAREN) 1 % Gel; Apply 2 g topically 4 (four) times daily.  Dispense: 100 g; Refill: 1    Mixed hyperlipidemia  -     atorvastatin (LIPITOR) 20 MG tablet; Take 1 tablet (20 mg total) by mouth every evening.  Dispense: 30 tablet; Refill: 11    Plantar fasciitis, bilateral    Type 2 diabetes mellitus with hyperosmolarity without coma, without long-term current use of insulin  -     Ambulatory consult to Ophthalmology  -     Hemoglobin A1c; Future; Expected date: 12/18/2017    Counseling on health promotion and disease prevention  -     Mammo Digital Screening Bilat with CAD; Future; Expected date: 09/19/2017  -     Case request GI: COLONOSCOPY      RTC in 2 weeks RE foot pain, DM2 & HL or sooner as needed

## 2017-09-21 ENCOUNTER — TELEPHONE (OUTPATIENT)
Dept: INTERNAL MEDICINE | Facility: CLINIC | Age: 57
End: 2017-09-21

## 2017-09-21 NOTE — TELEPHONE ENCOUNTER
Returned pt's phone call, spoke with pt states that her employer is requesting an excuse until further notice pt will be here in the morning to  excuse. Please advise

## 2017-09-21 NOTE — TELEPHONE ENCOUNTER
----- Message from Chelsey Garcia sent at 9/21/2017 12:13 PM CDT -----  Contact: Pt   Pt called and requested to be called back in regards to ICD-10 code for office visit for insurance purposes need before 4:00pm today ..[phone]

## 2017-09-26 ENCOUNTER — PATIENT OUTREACH (OUTPATIENT)
Dept: ADMINISTRATIVE | Facility: HOSPITAL | Age: 57
End: 2017-09-26

## 2017-09-26 NOTE — LETTER
September 26, 2017    Pricilla Bower  [address] [address]             [facility]  [address]  [place] [address]  Phone: [phone] Dear Mrs. Bower:    [facility] is committed to your overall health.  To help you get the most out of each of your visits, we will review your information to make sure you are up to date on all of your recommended tests and/or procedures.      Ramiro Perez MD has found that you may be due for   Health Maintenance Due   Topic    Mammogram     Colonoscopy     Influenza Vaccine         If you have had any of the above done at another facility, please bring the records or information with you so that your record at [facility] will be complete.    If you are currently taking medication, please bring it with you to your appointment for review.    We will be happy to assist you with scheduling any necessary appointments or you may contact the [facility] appointment desk at [phone] to schedule at your convenience.     Thank you for choosing [facility] for your healthcare needs,    Sherie DOCKERY LPN Care Coordinator  [facility]  [phone]

## 2017-10-04 ENCOUNTER — OFFICE VISIT (OUTPATIENT)
Dept: PODIATRY | Facility: CLINIC | Age: 57
End: 2017-10-04
Payer: COMMERCIAL

## 2017-10-04 VITALS
BODY MASS INDEX: 31.46 KG/M2 | WEIGHT: 195.75 LBS | HEIGHT: 66 IN | DIASTOLIC BLOOD PRESSURE: 68 MMHG | HEART RATE: 70 BPM | SYSTOLIC BLOOD PRESSURE: 128 MMHG

## 2017-10-04 DIAGNOSIS — G89.29 CHRONIC HEEL PAIN, RIGHT: ICD-10-CM

## 2017-10-04 DIAGNOSIS — M62.461 GASTROCNEMIUS EQUINUS OF RIGHT LOWER EXTREMITY: ICD-10-CM

## 2017-10-04 DIAGNOSIS — M79.671 CHRONIC HEEL PAIN, RIGHT: ICD-10-CM

## 2017-10-04 DIAGNOSIS — M72.2 PLANTAR FASCIITIS, BILATERAL: Primary | ICD-10-CM

## 2017-10-04 DIAGNOSIS — M21.40 ACQUIRED PES PLANOVALGUS, UNSPECIFIED LATERALITY: ICD-10-CM

## 2017-10-04 PROCEDURE — 99999 PR PBB SHADOW E&M-EST. PATIENT-LVL III: CPT | Mod: PBBFAC,,, | Performed by: PODIATRIST

## 2017-10-04 PROCEDURE — 99243 OFF/OP CNSLTJ NEW/EST LOW 30: CPT | Mod: S$GLB,,, | Performed by: PODIATRIST

## 2017-10-04 NOTE — PROGRESS NOTES
[facility]  PODIATRIC MEDICINE AND SURGERY  PROGRESS NOTE  10/17/2017    PODIATRY NOTE  PCP: Dr. Ramiro Perez MD    CHIEF COMPLAINT   Chief Complaint   Patient presents with    Ankle Pain     bilateral bottom of ankle pain worse right ankle    Heel Pain     bilateral heel pain worse right foot heel    Foot Pain     bilateral great toe pain worse on right        HPI  Pricilla Bower is a 57 y.o. female who has a past medical history of Blood pressure elevated without history of HTN; Diabetes mellitus, type 2; Hyperlipidemia; Hypertension; and Obesity (BMI 30.0-34.9).     Pricilla presents to clinic today complaining of bilateral foot and ankle pain. She states pain is primarily on right heel and plantar arch with radiation to ankle. She was referred to podiatry for further evaulation from Dr. Perez. Pt states symptoms have been present for 2 months. She denies any trauma. She did take advil which helped with symptoms.     Patient denies other pedal complaints at this time.    Hemoglobin A1C   Date Value Ref Range Status   09/19/2017 6.4 (H) 4.0 - 5.6 % Final     Comment:     According to ADA guidelines, hemoglobin A1c <7.0% represents  optimal control in non-pregnant diabetic patients. Different  metrics may apply to specific patient populations.   Standards of Medical Care in Diabetes-2016.  For the purpose of screening for the presence of diabetes:  <5.7%     Consistent with the absence of diabetes  5.7-6.4%  Consistent with increasing risk for diabetes   (prediabetes)  >or=6.5%  Consistent with diabetes  Currently, no consensus exists for use of hemoglobin A1c  for diagnosis of diabetes for children.  This Hemoglobin A1c assay has significant interference with fetal   hemoglobin   (HbF). The results are invalid for patients with abnormal amounts of   HbF,   including those with known Hereditary Persistence   of Fetal Hemoglobin. Heterozygous hemoglobin variants (HbAS, HbAC,   HbAD, HbAE,  HbA2) do not significantly interfere with this assay;   however, presence of multiple variants in a sample may impact the %   interference.           PMH  Past Medical History:   Diagnosis Date    Blood pressure elevated without history of HTN     On low dose BP rx in the past, but was able to stop due to 30 lb weight loss, 20 of which she regained    Diabetes mellitus, type 2     Hyperlipidemia     Hypertension     Obesity (BMI 30.0-34.9)        PROBLEM LIST  Patient Active Problem List    Diagnosis Date Noted    Bilateral foot pain 09/19/2017    Mixed hyperlipidemia 09/19/2017    Hyperglycemia 04/28/2017       MEDS  Current Outpatient Prescriptions on File Prior to Visit   Medication Sig Dispense Refill    atorvastatin (LIPITOR) 20 MG tablet Take 1 tablet (20 mg total) by mouth every evening. 30 tablet 11    butalbital-acetaminophen-caffeine -40 mg (FIORICET, ESGIC) -40 mg per tablet Take 1 tablet by mouth every 6 (six) hours as needed for Headaches. 20 tablet 0    metformin (GLUCOPHAGE) 500 MG tablet Take 1 tablet (500 mg total) by mouth daily with breakfast. 60 tablet 11    diclofenac sodium (VOLTAREN) 1 % Gel Apply 2 g topically 4 (four) times daily. 100 g 1     No current facility-administered medications on file prior to visit.        Medication List with Changes/Refills   Current Medications    ATORVASTATIN (LIPITOR) 20 MG TABLET    Take 1 tablet (20 mg total) by mouth every evening.    BUTALBITAL-ACETAMINOPHEN-CAFFEINE -40 MG (FIORICET, ESGIC) -40 MG PER TABLET    Take 1 tablet by mouth every 6 (six) hours as needed for Headaches.    DICLOFENAC SODIUM (VOLTAREN) 1 % GEL    Apply 2 g topically 4 (four) times daily.    METFORMIN (GLUCOPHAGE) 500 MG TABLET    Take 1 tablet (500 mg total) by mouth daily with breakfast.       PSH     Past Surgical History:   Procedure Laterality Date    NO PAST SURGERIES          ALL  Review of patient's allergies indicates:   Allergen  "Reactions    Darvocet a500 [propoxyphene n-acetaminophen] Nausea And Vomiting       SOC     Social History   Substance Use Topics    Smoking status: Never Smoker    Smokeless tobacco: Never Used    Alcohol use No         FAMILY HX    Family History   Problem Relation Age of Onset    Cancer Mother      cervical    Breast cancer Mother             REVIEW OF SYSTEMS  Constitutional: Negative for chills and fever.   Respiratory: Negative for shortness of breath.    Cardiovascular: Negative for chest pain, palpitations, orthopnea, claudication and leg swelling.   Gastrointestinal: Negative for diarrhea, nausea and vomiting.   Musculoskeletal: positive for right foot pain  Skin: Negative for rash.   Neurological: Negative for dizziness, tingling, sensory change, focal weakness and weakness.   Psychiatric/Behavioral: Negative.    PHYSICAL EXAM  Vitals:    10/04/17 1334   BP: 128/68   Pulse: 70   Weight: 88.8 kg (195 lb 12.3 oz)   Height: 5' 6" (1.676 m)   PainSc: 10-Worst pain ever   PainLoc: Foot       General: This patient is well-developed, well-nourished and appears stated age, well-oriented to person, place and time, and cooperative and pleasant on today's visit      LOWER EXTREMITY  Vascular exam:   · Dorsalis pedis and posterior tibial pulses palpable 2/4 bilaterally.   · Capillary refill time immediate to the toes.   · Feet are warm to the touch. Skin temperature warm to warm from proximally to distally   · There are no varicosities, telangiectasias noted to bilateral foot and ankle regions.   · There are no ecchymoses noted to bilateral foot and ankle regions.   · There is no gross lower extremity edema.    Dermatologic exam:   · Skin moist with healthy texture and turgor.  · There are no open ulcerations, lacerations, or fissures to bilateral foot and ankle regions. There are no signs of infection as there is no erythema, no proximal-extending lymphangiitis, no fluctuance, or crepitus noted on palpation to " bilateral foot and ankle regions.   · There is no interdigital maceration.   · There are hyperkeratotic lesions noted to feet. Nails are thickened mycotic but well-trimmed.    Neurologic exam:  · Epicritic sensation is intact as the patient is able to sense light touch to bilateral foot and ankle regions.   · Achilles and patellar deep tendon reflexes intact  · Babinski reflex absent    Musculoskeletal/Orthopedic exam:   · No symptomatic structural abnormalities noted  · Muscle strength AT/EHL/EDL/PT: 5/5; Achilles/Gastroc/Soleus: 5/5; PB/PL: 5/5 Muscle tone is normal.  · There is pain with palpation with medial tubercle of calcaneus RIGHT  · Ankle joint ROM  B/L supple DF/PF, non-crepitus  · STJ ROM supple inv/ev, non crepitus   · MTPJ b/l supple DF/PF, non crepitus  · Foot type: pronated with decreased medial longitudinal arch    IMAGING   Reviewed by me and I agree with radiologist findings, 3 views of foot/ankle, reveal:  Results for orders placed in visit on 09/19/17   X-Ray Foot Complete 3 view Bilateral    Narrative 3 views of either foot, 6 views total    Findings: There is no evidence to suggest acute fracture or dislocation.  Mild hallux valgus deformities noted bilaterally left greater than the right.  Mild degenerative changes noted throughout either foot.  Small dorsal and plantar calcaneal enthesophytes noted bilaterally.    Impression  As above      Electronically signed by: DEBI SONI D.O.  Date:     09/19/17  Time:    11:55            Results for orders placed in visit on 09/19/17   X-Ray Foot Complete 3 view Bilateral    Narrative 3 views of either foot, 6 views total    Findings: There is no evidence to suggest acute fracture or dislocation.  Mild hallux valgus deformities noted bilaterally left greater than the right.  Mild degenerative changes noted throughout either foot.  Small dorsal and plantar calcaneal enthesophytes noted bilaterally.    Impression  As above      Electronically  signed by: DEBI SONI D.O.  Date:     09/19/17  Time:    11:55        No results found for this or any previous visit.     No results found for this or any previous visit.        ASSESSMENT  Encounter Diagnoses   Name Primary?    Chronic heel pain, right     Plantar fasciitis, bilateral Yes    Gastrocnemius equinus of right lower extremity     Acquired pes planovalgus, unspecified laterality          PLAN  1. Patient was educated about clinical and imaging findings, and verbalizes understanding of above.     Diagnoses and all orders for this visit:  Plantar fasciitis, bilateral  -     Ambulatory Referral to Physical/Occupational Therapy    Chronic heel pain, right  -     Ambulatory Referral to Physical/Occupational Therapy    Gastrocnemius equinus of right lower extremity  -     Ambulatory Referral to Physical/Occupational Therapy    Acquired pes planovalgus, unspecified laterality  -     Ambulatory Referral to Physical/Occupational Therapy      2. Treatment plan: -I Reviewed films with patient. Discussed different treatment options for heel pain.   -I gave written and verbal instructions on heel cord stretching and this was demonstrated for the patient. A theraband was also provided to the patient for stretching exercises. Recommendations were given for plantar fasciitis treatment including icing, stretching, arch supports, avoidance of barefoot walking, appropriate shoe wear, and strict compliance. Discussed importance of patient to perform stretching exercises.   -Prescription was written for OTC arch supports. I Discussed that plantar fasciitis typically resolves on its own in a variable amount of time. If no improvement, will proceed with cortisone injection. I also  discussed possible tx with SLC, PT, Dynasplint and custom orthotics.Surgical intervention is the last resort for plantar fasciitis.  Information was given regarding the patient's condition and prognosis. All the patient's questions were  answered.   -PT referral placed    3. RTC  for follow up/evaluation as scheduled       Future Appointments  Date Time Provider Department Center   11/15/2017 2:00 PM Jeanine Cortez DPM [facility] POD [place]       Report Electronically Signed By:  Jeanine Cortez DPM   Podiatric Medicine & Surgery  [facility]  10/17/2017

## 2017-10-04 NOTE — LETTER
October 17, 2017      Ramiro Perez MD  [address]  [place] [address]           [facility]  [address] [place] [address]  Phone: [phone]  Fax: [phone]          Patient: Pricilla Bower   MR Number: [id number]   YOB: 1960   Date of Visit: 10/4/2017       Dear Dr. Ramiro Perez:    Thank you for referring Pricilla Bower to me for evaluation. Attached you will find relevant portions of my assessment and plan of care.    If you have questions, please do not hesitate to call me. I look forward to following Pricilla Bower along with you.    Sincerely,    Jeanine Cortez DPM    Enclosure  CC:  No Recipients    If you would like to receive this communication electronically, please contact [email or web address] or [phone] to request more information on [employer] Link access.    For providers and/or their staff who would like to refer a patient to [facility], please contact us through our one-stop-shop provider referral line, [facility], at [phone].    If you feel you have received this communication in error or would no longer like to receive these types of communications, please e-mail [email or web address]

## 2017-11-02 ENCOUNTER — OFFICE VISIT (OUTPATIENT)
Dept: INTERNAL MEDICINE | Facility: CLINIC | Age: 57
End: 2017-11-02
Payer: COMMERCIAL

## 2017-11-02 VITALS
WEIGHT: 199.75 LBS | SYSTOLIC BLOOD PRESSURE: 140 MMHG | HEART RATE: 82 BPM | BODY MASS INDEX: 31.35 KG/M2 | TEMPERATURE: 99 F | HEIGHT: 67 IN | RESPIRATION RATE: 18 BRPM | DIASTOLIC BLOOD PRESSURE: 79 MMHG | OXYGEN SATURATION: 99 %

## 2017-11-02 DIAGNOSIS — E78.2 MIXED HYPERLIPIDEMIA: ICD-10-CM

## 2017-11-02 DIAGNOSIS — M79.672 BILATERAL FOOT PAIN: ICD-10-CM

## 2017-11-02 DIAGNOSIS — R03.0 ELEVATED BLOOD PRESSURE READING: ICD-10-CM

## 2017-11-02 DIAGNOSIS — E78.5 HYPERLIPIDEMIA, UNSPECIFIED HYPERLIPIDEMIA TYPE: ICD-10-CM

## 2017-11-02 DIAGNOSIS — G62.9 PERIPHERAL POLYNEUROPATHY: Primary | ICD-10-CM

## 2017-11-02 DIAGNOSIS — E55.9 VITAMIN D DEFICIENCY: ICD-10-CM

## 2017-11-02 DIAGNOSIS — M72.2 PLANTAR FASCIITIS, BILATERAL: ICD-10-CM

## 2017-11-02 DIAGNOSIS — M79.671 BILATERAL FOOT PAIN: ICD-10-CM

## 2017-11-02 DIAGNOSIS — E11.00 TYPE 2 DIABETES MELLITUS WITH HYPEROSMOLARITY WITHOUT COMA, WITHOUT LONG-TERM CURRENT USE OF INSULIN: ICD-10-CM

## 2017-11-02 PROCEDURE — 99999 PR PBB SHADOW E&M-EST. PATIENT-LVL III: CPT | Mod: PBBFAC,,, | Performed by: INTERNAL MEDICINE

## 2017-11-02 PROCEDURE — 99214 OFFICE O/P EST MOD 30 MIN: CPT | Mod: S$GLB,,, | Performed by: INTERNAL MEDICINE

## 2017-11-02 PROCEDURE — 82570 ASSAY OF URINE CREATININE: CPT

## 2017-11-02 RX ORDER — ATORVASTATIN CALCIUM 40 MG/1
40 TABLET, FILM COATED ORAL NIGHTLY
Qty: 90 TABLET | Refills: 3 | Status: SHIPPED | OUTPATIENT
Start: 2017-11-02 | End: 2018-01-31 | Stop reason: SDUPTHER

## 2017-11-02 RX ORDER — GABAPENTIN 100 MG/1
100 CAPSULE ORAL 3 TIMES DAILY
Qty: 30 CAPSULE | Refills: 11 | Status: SHIPPED | OUTPATIENT
Start: 2017-11-02 | End: 2019-03-05 | Stop reason: SDUPTHER

## 2017-11-02 NOTE — PROGRESS NOTES
Subjective:      Patient ID: Pricilla Bower is a 57 y.o. female.    Chief Complaint: Follow-up and Foot Pain      HPI  Ms. Bower is a patient of mine, who presents for f/u on foot pain and tingling. She reports shooting pains in her feet. She tried to go to PT, but had an ins issue, which is currently being addressed. No recent trauma to feet. Of note, she was found to have plantar fasciitis by her podiatrist.     Past Medical History:   Diagnosis Date    Blood pressure elevated without history of HTN     On low dose BP rx in the past, but was able to stop due to 30 lb weight loss, 20 of which she regained    Diabetes mellitus, type 2     Hyperlipidemia     Hypertension     Obesity (BMI 30.0-34.9)      Past Surgical History:   Procedure Laterality Date    NO PAST SURGERIES       Social History     Social History    Marital status:      Spouse name: N/A    Number of children: N/A    Years of education: N/A     Occupational History    Not on file.     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)    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: 1 plain biscuit w/ grape jelly; Sprite    Lunch: 3p: Salad or ribs from [employer]; Sweetened tea    Dinner: Banana or other fruit; water    Snacks: None    Eats out: 7d/wk    Water: 100 oz/day (started 3 days ago)    PA: None    Goal weight is 170 lbs by 12/31/17         Family History   Problem Relation Age of Onset    Cancer Mother      cervical    Breast cancer Mother        Current Outpatient Prescriptions:     atorvastatin (LIPITOR) 40 MG tablet, Take 1 tablet (40 mg total) by mouth every evening., Disp: 90 tablet, Rfl: 3     "butalbital-acetaminophen-caffeine -40 mg (FIORICET, ESGIC) -40 mg per tablet, Take 1 tablet by mouth every 6 (six) hours as needed for Headaches., Disp: 20 tablet, Rfl: 0    metformin (GLUCOPHAGE) 500 MG tablet, Take 1 tablet (500 mg total) by mouth daily with breakfast., Disp: 60 tablet, Rfl: 11    diclofenac sodium (VOLTAREN) 1 % Gel, Apply 2 g topically 4 (four) times daily., Disp: 100 g, Rfl: 1    gabapentin (NEURONTIN) 100 MG capsule, Take 1 capsule (100 mg total) by mouth 3 (three) times daily., Disp: 30 capsule, Rfl: 11    Review of patient's allergies indicates:   Allergen Reactions    Darvocet a500 [propoxyphene n-acetaminophen] Nausea And Vomiting     Review of Systems   Constitutional: Negative.   Cardiovascular: Negative.   Respiratory: Negative.   Gastrointestinal: Negative.   Genitourinary: Negative.   Musculoskeletal: Negative.   Derm: Negative.  Allergic/Immunologic: Negative.   Endocrine: Negative.   Hematological: Negative.   Neurological: Negative.   Psychiatric/Behavioral: Negative.     Objective:     BP (!) 140/79   Pulse 82   Temp 98.8 °F (37.1 °C) (Tympanic)   Resp 18   Ht 5' 7" (1.702 m)   Wt 90.6 kg (199 lb 11.8 oz)   SpO2 99%   BMI 31.28 kg/m²     Physical Exam  GEN: A&O fully, NAD  PSYC: Normal affect    Lab Results   Component Value Date    WBC 5.93 06/05/2017    HGB 13.5 06/05/2017    HCT 40.1 06/05/2017     06/05/2017    CHOL 222 (H) 06/05/2017    TRIG 193 (H) 06/05/2017    HDL 40 06/05/2017    ALT 25 06/05/2017    AST 18 06/05/2017     06/05/2017    K 3.8 06/05/2017     06/05/2017    CREATININE 0.9 06/05/2017    BUN 10 06/05/2017    CO2 26 06/05/2017    TSH 1.306 06/05/2017    HGBA1C 6.4 (H) 09/19/2017       Assessment:      1. Peripheral polyneuropathy    2. Plantar fasciitis, bilateral    3. Type 2 diabetes mellitus with hyperosmolarity without coma, without long-term current use of insulin   4. Bilateral foot pain    5. Vitamin D deficiency  "   6.      Hyperlipidemia:   7.      Obesity: Discussed strategies for lifestyle modifications, including systematically increasing physical            activity, water; and avoiding potatoes, sugar sweetened beverages, red/processed meats, and fast food;            and increasing yogurt, whole fruits, unsalted nuts, whole grains, non-starchy vegetables, beans, weight logging.  8.      Elevated blood pressure: Not at goal on manual repeat. Several previous readings are high, but not             2 consecutive readings. Encouraged to increase dietary K, Ca & Mg and to avoid dietary Na.  9.      HM: She declines vaccinations, and is in process of scheduling colonoscopy & MMG. Will refer to ophthalmology.    Plan:   Peripheral polyneuropathy  -     gabapentin (NEURONTIN) 100 MG capsule; Take 1 capsule (100 mg total) by mouth 3 (three) times daily.  Dispense: 30 capsule; Refill: 11    Plantar fasciitis, bilateral    Type 2 diabetes mellitus with hyperosmolarity without coma, without long-term current use of insulin  -     Microalbumin/creatinine urine ratio  -     Ambulatory consult to Ophthalmology    Bilateral foot pain    Vitamin D deficiency    Elevated blood pressure reading    Hyperlipidemia, unspecified hyperlipidemia type  -     atorvastatin (LIPITOR) 40 MG tablet; Take 1 tablet (40 mg total) by mouth every evening.  Dispense: 90 tablet; Refill: 3    Mixed hyperlipidemia  -     atorvastatin (LIPITOR) 40 MG tablet; Take 1 tablet (40 mg total) by mouth every evening.  Dispense: 90 tablet; Refill: 3        RTC in 2 weeks RE elevated BP or sooner as needed

## 2017-11-03 LAB
CREAT UR-MCNC: 274 MG/DL
MICROALBUMIN UR DL<=1MG/L-MCNC: 31 UG/ML
MICROALBUMIN/CREATININE RATIO: 11.3 UG/MG

## 2017-12-12 ENCOUNTER — PATIENT OUTREACH (OUTPATIENT)
Dept: ADMINISTRATIVE | Facility: HOSPITAL | Age: 57
End: 2017-12-12

## 2017-12-12 NOTE — PROGRESS NOTES
Unable to reach pt via phone. No voice mail available re scheduling annual mammogram and DM eye(1ST ATTEMPT)

## 2018-01-31 ENCOUNTER — OFFICE VISIT (OUTPATIENT)
Dept: INTERNAL MEDICINE | Facility: CLINIC | Age: 58
End: 2018-01-31
Payer: COMMERCIAL

## 2018-01-31 VITALS
OXYGEN SATURATION: 98 % | HEART RATE: 77 BPM | BODY MASS INDEX: 31.89 KG/M2 | DIASTOLIC BLOOD PRESSURE: 80 MMHG | TEMPERATURE: 97 F | WEIGHT: 198.44 LBS | SYSTOLIC BLOOD PRESSURE: 152 MMHG | HEIGHT: 66 IN

## 2018-01-31 DIAGNOSIS — R51.9 ACUTE NONINTRACTABLE HEADACHE, UNSPECIFIED HEADACHE TYPE: ICD-10-CM

## 2018-01-31 DIAGNOSIS — E78.2 MIXED HYPERLIPIDEMIA: ICD-10-CM

## 2018-01-31 DIAGNOSIS — R73.9 HYPERGLYCEMIA: ICD-10-CM

## 2018-01-31 DIAGNOSIS — I10 ESSENTIAL HYPERTENSION: Primary | ICD-10-CM

## 2018-01-31 DIAGNOSIS — Z12.11 SCREENING FOR COLON CANCER: ICD-10-CM

## 2018-01-31 DIAGNOSIS — Z12.39 SCREENING FOR BREAST CANCER: ICD-10-CM

## 2018-01-31 DIAGNOSIS — E11.00 TYPE 2 DIABETES MELLITUS WITH HYPEROSMOLARITY WITHOUT COMA, WITHOUT LONG-TERM CURRENT USE OF INSULIN: ICD-10-CM

## 2018-01-31 DIAGNOSIS — E78.5 HYPERLIPIDEMIA, UNSPECIFIED HYPERLIPIDEMIA TYPE: ICD-10-CM

## 2018-01-31 PROCEDURE — 99214 OFFICE O/P EST MOD 30 MIN: CPT | Mod: S$GLB,,, | Performed by: INTERNAL MEDICINE

## 2018-01-31 PROCEDURE — 3008F BODY MASS INDEX DOCD: CPT | Mod: S$GLB,,, | Performed by: INTERNAL MEDICINE

## 2018-01-31 PROCEDURE — 99999 PR PBB SHADOW E&M-EST. PATIENT-LVL III: CPT | Mod: PBBFAC,,, | Performed by: INTERNAL MEDICINE

## 2018-01-31 RX ORDER — METFORMIN HYDROCHLORIDE 500 MG/1
500 TABLET ORAL
Qty: 60 TABLET | Refills: 11 | Status: SHIPPED | OUTPATIENT
Start: 2018-01-31 | End: 2019-03-05 | Stop reason: SDUPTHER

## 2018-01-31 RX ORDER — GLUCOSAMINE HCL 500 MG
1 TABLET ORAL DAILY
COMMUNITY
Start: 2018-01-31 | End: 2024-03-15 | Stop reason: CLARIF

## 2018-01-31 RX ORDER — ATORVASTATIN CALCIUM 40 MG/1
40 TABLET, FILM COATED ORAL NIGHTLY
Qty: 90 TABLET | Refills: 3 | Status: SHIPPED | OUTPATIENT
Start: 2018-01-31 | End: 2019-07-03 | Stop reason: SDUPTHER

## 2018-01-31 RX ORDER — AMLODIPINE BESYLATE 5 MG/1
5 TABLET ORAL DAILY
Qty: 30 TABLET | Refills: 11 | Status: SHIPPED | OUTPATIENT
Start: 2018-01-31 | End: 2019-03-05 | Stop reason: SDUPTHER

## 2018-01-31 NOTE — PROGRESS NOTES
Subjective:      Patient ID: Pricilla Bower is a 57 y.o. female.    Chief Complaint: Medication Refill and Headache      HPI     Ms. Pricilla Bower is a patient of Ramiro Perez MD, who presents for rx refills and HA, which she attributes to her BP.      Past Medical History:   Diagnosis Date    Blood pressure elevated without history of HTN     On low dose BP rx in the past, but was able to stop due to 30 lb weight loss, 20 of which she regained    Diabetes mellitus, type 2 2010    Hyperlipidemia     Hypertension     Obesity (BMI 30.0-34.9)      Past Surgical History:   Procedure Laterality Date    NO PAST SURGERIES       Social History     Social History    Marital status:      Spouse name: N/A    Number of children: N/A    Years of education: N/A     Occupational History    Not on file.     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 [employer] full-time)    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: 1 plain biscuit w/ grape jelly; Sprite    Lunch: 3p: Salad or ribs from [employer]; Sweetened tea    Dinner: Banana or other fruit; water    Snacks: None    Eats out: 7d/wk    Water: 100 oz/day (started 3 days ago)    PA: None    Goal weight is 170 lbs by 12/31/17         Family History   Problem Relation Age of Onset    Cancer Mother      cervical    Breast cancer Mother        Current Outpatient Prescriptions:     atorvastatin (LIPITOR) 40 MG tablet, Take 1 tablet (40 mg total) by mouth every evening., Disp: 90 tablet, Rfl: 3    butalbital-acetaminophen-caffeine -40 mg (FIORICET, ESGIC) -40 mg per tablet, Take 1 tablet by mouth every 6 (six) hours as needed for Headaches.,  "Disp: 20 tablet, Rfl: 0    gabapentin (NEURONTIN) 100 MG capsule, Take 1 capsule (100 mg total) by mouth 3 (three) times daily., Disp: 30 capsule, Rfl: 11    metFORMIN (GLUCOPHAGE) 500 MG tablet, Take 1 tablet (500 mg total) by mouth daily with breakfast., Disp: 60 tablet, Rfl: 11    amLODIPine (NORVASC) 5 MG tablet, Take 1 tablet (5 mg total) by mouth once daily., Disp: 30 tablet, Rfl: 11    diclofenac sodium (VOLTAREN) 1 % Gel, Apply 2 g topically 4 (four) times daily., Disp: 100 g, Rfl: 1    UBIDECARENONE (COENZYME Q10) 100 mg Tab, Take 1 tablet (100 mg total) by mouth once daily., Disp: , Rfl:     Review of patient's allergies indicates:   Allergen Reactions    Darvocet a500 [propoxyphene n-acetaminophen] Nausea And Vomiting    Penicillins         Review of Systems   Negative    Objective:     BP (!) 152/80 (BP Location: Left arm, Patient Position: Sitting, BP Method: Large (Manual))   Pulse 77   Temp 97.3 °F (36.3 °C) (Tympanic)   Ht 5' 6" (1.676 m)   Wt 90 kg (198 lb 6.6 oz)   SpO2 98%   BMI 32.02 kg/m²     Physical Exam  GEN: A&O fully, NAD  PSYC: Normal affect  EXT: No C/C/E    Lab Results   Component Value Date    WBC 5.93 06/05/2017    HGB 13.5 06/05/2017    HCT 40.1 06/05/2017     06/05/2017    CHOL 222 (H) 06/05/2017    TRIG 193 (H) 06/05/2017    HDL 40 06/05/2017    LDLCALC 143.4 06/05/2017    ALT 25 06/05/2017    AST 18 06/05/2017     06/05/2017    K 3.8 06/05/2017     06/05/2017    CREATININE 0.9 06/05/2017    BUN 10 06/05/2017    CO2 26 06/05/2017    TSH 1.306 06/05/2017    HGBA1C 6.4 (H) 09/19/2017       Assessment:      1. Essential hypertension: Risks and benefits discussed and patient chose to move forward with amlodipine 5 mg 1 po qd.   2. DM2: Will renew metformin.   3. Mixed hyperlipidemia: Will renew atorvastatin.   4. HM: Declined pneumovax. Open to MMG, which her obgyn will order based on exam. Will order colonoscopy, ophthalmological consult given DM2.   5. " Acute nonintractable headache, unspecified headache type: Likely 2/2 untreated HTN. DDx includes dehydration. Encouraged drinking 100 oz H2O/d.       Plan:   Essential hypertension  -     amLODIPine (NORVASC) 5 MG tablet; Take 1 tablet (5 mg total) by mouth once daily.  Dispense: 30 tablet; Refill: 11    Hyperglycemia  -     metFORMIN (GLUCOPHAGE) 500 MG tablet; Take 1 tablet (500 mg total) by mouth daily with breakfast.  Dispense: 60 tablet; Refill: 11  -     UBIDECARENONE (COENZYME Q10) 100 mg Tab; Take 1 tablet (100 mg total) by mouth once daily.    Mixed hyperlipidemia  -     atorvastatin (LIPITOR) 40 MG tablet; Take 1 tablet (40 mg total) by mouth every evening.  Dispense: 90 tablet; Refill: 3    Hyperlipidemia, unspecified hyperlipidemia type  -     atorvastatin (LIPITOR) 40 MG tablet; Take 1 tablet (40 mg total) by mouth every evening.  Dispense: 90 tablet; Refill: 3    Acute nonintractable headache, unspecified headache type    Screening for colon cancer  -     Case request GI: COLONOSCOPY    Type 2 diabetes mellitus with hyperosmolarity without coma, without long-term current use of insulin  -     Ambulatory consult to Ophthalmology    Screening for breast cancer  -     Ambulatory consult to Obstetrics / Gynecology        Follow-up in about 4 weeks (around 2/28/2018), or if symptoms worsen or fail to improve, for FU on HA and HTN.

## 2018-02-07 ENCOUNTER — DOCUMENTATION ONLY (OUTPATIENT)
Dept: GASTROENTEROLOGY | Facility: CLINIC | Age: 58
End: 2018-02-07

## 2018-02-27 ENCOUNTER — PATIENT OUTREACH (OUTPATIENT)
Dept: ADMINISTRATIVE | Facility: HOSPITAL | Age: 58
End: 2018-02-27

## 2018-02-27 NOTE — LETTER
February 27, 2018    Pricilla Bower  [address] [address]             [facility]  [address]  [place] [address]  Phone: [phone] Dear Mr. Bower:    [facility] is committed to your overall health.  To help you get the most out of each of your visits, we will review your information to make sure you are up to date on all of your recommended tests and/or procedures.      Ramiro Perez MD has found that you may be due for   Health Maintenance Due   Topic    Eye Exam     Mammogram     Colonoscopy         If you have had any of the above done at another facility, please bring the records or information with you so that your record at [facility] will be complete.    If you are currently taking medication, please bring it with you to your appointment for review.    We will be happy to assist you with scheduling any necessary appointments or you may contact the [facility] appointment desk at [phone] to schedule at your convenience.     Thank you for choosing [facility] for your healthcare needs,    Sherie DOCKERY LPN Care Coordinator  [facility]  [phone]

## 2018-04-13 DIAGNOSIS — E11.9 TYPE 2 DIABETES MELLITUS WITHOUT COMPLICATION: ICD-10-CM

## 2018-05-08 ENCOUNTER — PATIENT OUTREACH (OUTPATIENT)
Dept: ADMINISTRATIVE | Facility: HOSPITAL | Age: 58
End: 2018-05-08

## 2018-05-08 NOTE — PROGRESS NOTES
Spoke with pt re scheduling mammogram and optometry appt. Mammogram scheduled for 05/24/18 at 1 pm and Dr. Maurisio Brewster at 2 PM. Instructed pt on appts. Pt verbalized understanding. Appt letter mailed.

## 2018-08-31 ENCOUNTER — PATIENT OUTREACH (OUTPATIENT)
Dept: ADMINISTRATIVE | Facility: HOSPITAL | Age: 58
End: 2018-08-31

## 2018-09-11 ENCOUNTER — PATIENT OUTREACH (OUTPATIENT)
Dept: ADMINISTRATIVE | Facility: HOSPITAL | Age: 58
End: 2018-09-11

## 2018-09-11 NOTE — LETTER
September 11, 2018    Pricilla Bower  [id number]  [id number]              Dear Pricilla Grewal Sathish    Our system indicates that you may be due for the following:   Health Maintenance Due   Topic    Pneumococcal PPSV23 (Medium Risk) (1)    Mammogram     Hemoglobin A1c     Zoster Vaccine     Lipid Panel     Influenza Vaccine     Foot Exam     Urine Microalbumin        Ramiro Perez MD would like you to schedule an appointment for an annual exam at your earliest convenience. If you have completed any of these health maintenance requirements at an outside facility, please contact our office so we may update your health record.    If your PRIMARY CARE PHYSICIAN has changed, please let us know so we may update your record.    If you have any issues or need assistance in scheduling this appointment, please call the number below.     Thank you for choosing [facility] for all your health care needs.     Sherie DOCKERY LPN Care Coordinator  [facility]  [phone]

## 2018-10-19 DIAGNOSIS — E11.9 TYPE 2 DIABETES MELLITUS WITHOUT COMPLICATION: ICD-10-CM

## 2018-11-09 ENCOUNTER — PATIENT OUTREACH (OUTPATIENT)
Dept: ADMINISTRATIVE | Facility: HOSPITAL | Age: 58
End: 2018-11-09

## 2018-11-20 ENCOUNTER — PATIENT OUTREACH (OUTPATIENT)
Dept: ADMINISTRATIVE | Facility: HOSPITAL | Age: 58
End: 2018-11-20

## 2018-11-20 NOTE — PROGRESS NOTES
I have attempted with out success to contact patient to schedule dm eye exam.  Left voice mail.

## 2019-01-04 DIAGNOSIS — E11.9 TYPE 2 DIABETES MELLITUS WITHOUT COMPLICATION: ICD-10-CM

## 2019-02-14 ENCOUNTER — PATIENT OUTREACH (OUTPATIENT)
Dept: ADMINISTRATIVE | Facility: HOSPITAL | Age: 59
End: 2019-02-14

## 2019-02-14 NOTE — PROGRESS NOTES
Contacted patient to schedule annual pcp appointment. Patient has an appointment scheduled on 03-05-19.

## 2019-02-19 ENCOUNTER — PATIENT OUTREACH (OUTPATIENT)
Dept: ADMINISTRATIVE | Facility: HOSPITAL | Age: 59
End: 2019-02-19

## 2019-02-19 NOTE — LETTER
February 19, 2019        Pricilla Bower  [address] [address]      Dear Mrs. Bower,    You have an upcoming appointment with Ramiro Perez MD on 03/05/19      Your chart is indicating you may be due for the following and I will be happy to assist you in scheduling any needed appointments:  Health Maintenance Due   Topic    Eye Exam     Pneumococcal Vaccine (Medium Risk) (1 of 1 - PPSV23)    Mammogram     Colonoscopy     Hemoglobin A1c     Zoster Vaccine     Lipid Panel     Influenza Vaccine     Foot Exam     Urine Microalbumin     Low Dose Statin           If you have had any of the above done at another facility, please bring the records or information with you so that your record at [facility] will be complete.    We will be happy to assist you with scheduling any necessary appointments or you may contact the [facility] appointment desk at [phone] to schedule at your convenience.     Thank you for choosing [facility] for your healthcare needs,      Sherie DOCKERY, LPN Care Coordinator  [facility]  [phone]

## 2019-03-05 ENCOUNTER — OFFICE VISIT (OUTPATIENT)
Dept: INTERNAL MEDICINE | Facility: CLINIC | Age: 59
End: 2019-03-05

## 2019-03-05 VITALS
WEIGHT: 195 LBS | SYSTOLIC BLOOD PRESSURE: 150 MMHG | HEIGHT: 66 IN | DIASTOLIC BLOOD PRESSURE: 88 MMHG | HEART RATE: 67 BPM | BODY MASS INDEX: 31.34 KG/M2 | TEMPERATURE: 97 F

## 2019-03-05 DIAGNOSIS — R73.9 HYPERGLYCEMIA: ICD-10-CM

## 2019-03-05 DIAGNOSIS — E66.9 OBESITY (BMI 30.0-34.9): ICD-10-CM

## 2019-03-05 DIAGNOSIS — M79.672 BILATERAL FOOT PAIN: ICD-10-CM

## 2019-03-05 DIAGNOSIS — G62.9 PERIPHERAL POLYNEUROPATHY: ICD-10-CM

## 2019-03-05 DIAGNOSIS — M79.671 BILATERAL FOOT PAIN: ICD-10-CM

## 2019-03-05 DIAGNOSIS — E78.2 MIXED HYPERLIPIDEMIA: ICD-10-CM

## 2019-03-05 DIAGNOSIS — Z12.39 SCREENING FOR BREAST CANCER: ICD-10-CM

## 2019-03-05 DIAGNOSIS — M79.671 RIGHT FOOT PAIN: ICD-10-CM

## 2019-03-05 DIAGNOSIS — Z12.11 SCREENING FOR COLON CANCER: ICD-10-CM

## 2019-03-05 DIAGNOSIS — E11.00 TYPE 2 DIABETES MELLITUS WITH HYPEROSMOLARITY WITHOUT COMA, WITHOUT LONG-TERM CURRENT USE OF INSULIN: ICD-10-CM

## 2019-03-05 DIAGNOSIS — I10 ESSENTIAL HYPERTENSION: Primary | ICD-10-CM

## 2019-03-05 DIAGNOSIS — E55.9 VITAMIN D DEFICIENCY: ICD-10-CM

## 2019-03-05 PROCEDURE — 99213 OFFICE O/P EST LOW 20 MIN: CPT | Mod: PBBFAC,PN | Performed by: INTERNAL MEDICINE

## 2019-03-05 PROCEDURE — 99214 OFFICE O/P EST MOD 30 MIN: CPT | Mod: S$PBB,,, | Performed by: INTERNAL MEDICINE

## 2019-03-05 PROCEDURE — 99214 PR OFFICE/OUTPT VISIT, EST, LEVL IV, 30-39 MIN: ICD-10-PCS | Mod: S$PBB,,, | Performed by: INTERNAL MEDICINE

## 2019-03-05 PROCEDURE — 99999 PR PBB SHADOW E&M-EST. PATIENT-LVL III: CPT | Mod: PBBFAC,,, | Performed by: INTERNAL MEDICINE

## 2019-03-05 PROCEDURE — 99999 PR PBB SHADOW E&M-EST. PATIENT-LVL III: ICD-10-PCS | Mod: PBBFAC,,, | Performed by: INTERNAL MEDICINE

## 2019-03-05 RX ORDER — NAPROXEN 500 MG/1
TABLET ORAL
Refills: 0 | COMMUNITY
Start: 2018-12-11 | End: 2019-04-03 | Stop reason: SINTOL

## 2019-03-05 RX ORDER — DICLOFENAC SODIUM 10 MG/G
2 GEL TOPICAL 4 TIMES DAILY
Qty: 100 G | Refills: 11 | Status: SHIPPED | OUTPATIENT
Start: 2019-03-05 | End: 2021-05-13

## 2019-03-05 RX ORDER — METFORMIN HYDROCHLORIDE 500 MG/1
500 TABLET ORAL
Qty: 60 TABLET | Refills: 11 | Status: SHIPPED | OUTPATIENT
Start: 2019-03-05 | End: 2019-03-11

## 2019-03-05 RX ORDER — AMLODIPINE BESYLATE 5 MG/1
5 TABLET ORAL DAILY
Qty: 30 TABLET | Refills: 11 | Status: SHIPPED | OUTPATIENT
Start: 2019-03-05 | End: 2020-05-22

## 2019-03-05 RX ORDER — GABAPENTIN 100 MG/1
100 CAPSULE ORAL 3 TIMES DAILY
Qty: 30 CAPSULE | Refills: 11 | Status: SHIPPED | OUTPATIENT
Start: 2019-03-05 | End: 2020-08-07

## 2019-03-05 NOTE — PROGRESS NOTES
Subjective:      Patient ID: Pricilla Bower is a 58 y.o. female.    Chief Complaint: Annual Exam      HPI     Ms. Pricilla Bower is a patient of Ramiro Perez MD, who presents for FU on HA and HTN (lost to f/u).     She reports her right foot pain that initially started 11/2017, for which she was dx'd with polyneuropathy (DM2) and plantar fasciitis, which initially improved, but recurred and became more severe to the point of being associated with swelling and was seen in the ED at [facility] on 12/2/18, at which time x-rays were unremarkable and she was tx'd with ibuprofen and DC'd home with a prescription for Naproxen 500 mg 1 po BID x 7 days, which she had filled but had to also obtain a similar medication from her friend. She reports not being able to work as a  due to being required to be on her feet for prolonged periods of time since her pain is worse in that situation despite taking NSAIDs.      VS, labs & imaging reviewed and discussed with patient, including 3 lb wt loss 1/2018-3/2019, A1c 6.4% on 9/19/17; vitamin D of 16 ng/mL,  & HDL 40 on 6/5/17.    Of note, EPIC indicates due preventive measures, including A1c, FLP, MMG, low dose statin, eye exam, microalb, PPSV23, FLU, & shingles.    She reports her HA are improved due to Naproxen.      Past Medical History:   Diagnosis Date    Diabetes mellitus, type 2 2010    Hyperlipidemia     Hypertension     Obesity (BMI 30.0-34.9)     Vitamin D deficiency      Past Surgical History:   Procedure Laterality Date    NO PAST SURGERIES       Social History     Socioeconomic History    Marital status:      Spouse name: Not on file    Number of children: Not on file    Years of education: Not on file    Highest education level: Not on file   Social Needs    Financial resource strain: Not on file    Food insecurity - worry: Not on file    Food insecurity - inability: Not on file    Transportation needs - medical: Not on file     Transportation needs - non-medical: Not on file   Occupational History    Not on file   Tobacco Use    Smoking status: Never Smoker    Smokeless tobacco: Never Used   Substance and Sexual Activity    Alcohol use: No    Drug use: No    Sexual activity: Yes     Partners: Male     Comment:  16 years to her    Other Topics Concern    Not on file   Social History Narrative    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[employer], furniture store, [employer]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)    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[employer]: 1 plain biscuit w/ grape jelly; Sprite    Lunch: 3p: Salad or ribs from [employer]; Sweetened tea    Dinner: Banana or other fruit; water    Snacks: None    Eats out: 7d/wk    Water: 100 oz/day (started 3 days ago)    PA: None    Goal weight is 170 lbs by 12/31/17     Family History   Problem Relation Age of Onset    Cancer Mother         cervical    Breast cancer Mother        Current Outpatient Medications:     amLODIPine (NORVASC) 5 MG tablet, Take 1 tablet (5 mg total) by mouth once daily., Disp: 30 tablet, Rfl: 11    atorvastatin (LIPITOR) 40 MG tablet, Take 1 tablet (40 mg total) by mouth every evening., Disp: 90 tablet, Rfl: 3    butalbital-acetaminophen-caffeine -40 mg (FIORICET, ESGIC) -40 mg per tablet, Take 1 tablet by mouth every 6 (six) hours as needed for Headaches., Disp: 20 tablet, Rfl: 0    diclofenac sodium (VOLTAREN) 1 % Gel, Apply 2 g topically 4 (four) times daily. for 10 days, Disp: 100 g, Rfl: 11    gabapentin (NEURONTIN) 100 MG capsule, Take 1 capsule (100 mg total) by mouth 3 (three) times daily., Disp: 30 capsule, Rfl: 11    metFORMIN (GLUCOPHAGE) 500 MG tablet, Take 1 tablet (500 mg total) by mouth daily with breakfast., Disp: 60 tablet, Rfl: 11    naproxen (NAPROSYN) 500 MG tablet, TK 1 T PO  BID FOR 7 DAYS, Disp: , Rfl: 0    UBIDECARENONE (COENZYME Q10) 100 mg Tab, Take 1 tablet (100 mg total) by mouth once daily., Disp: , Rfl:     Review of patient's  "allergies indicates:   Allergen Reactions    Darvocet a500 [propoxyphene n-acetaminophen] Nausea And Vomiting    Penicillins         Review of Systems   All remaining systems negative    Objective:     BP (!) 150/88   Pulse 67   Temp 97.1 °F (36.2 °C) (Tympanic)   Ht 5' 6" (1.676 m)   Wt 88.5 kg (195 lb)   BMI 31.47 kg/m²     Physical Exam  GEN: A&O fully, NAD  PSYC: Normal affect  EXT: Right foot TTP at plantar surface and heel; TTP with edema in region of medial maleolus      Lab Results   Component Value Date    WBC 5.93 06/05/2017    HGB 13.5 06/05/2017    HCT 40.1 06/05/2017     06/05/2017    CHOL 222 (H) 06/05/2017    TRIG 193 (H) 06/05/2017    HDL 40 06/05/2017    LDLCALC 143.4 06/05/2017    ALT 25 06/05/2017    AST 18 06/05/2017     06/05/2017    K 3.8 06/05/2017     06/05/2017    CREATININE 0.9 06/05/2017    BUN 10 06/05/2017    CO2 26 06/05/2017    CALCIUM 9.6 06/05/2017    HGBA1C 6.4 (H) 09/19/2017       Assessment:      1. Essential hypertension: Not at goal on manual repeat. Likely high 2/2 running out of her amlodipine 3 weeks ago, which we refilled today and she understands to RTC for nurse BP check.    2. Type 2 diabetes mellitus with hyperosmolarity without coma, without long-term current use of insulin: Will check A1c today and refilled her metformin, which she had run out of 3 weeks ago.    3. Mixed hyperlipidemia: High TG & low HDL. As above.    4. Obesity (BMI 30.0-34.9): Lost 3 lb from 1/2018-3/2019. Discussed strategies for lifestyle modifications, particularly systematically increasing UE physical activity (5-90 min/episode, 3-5 times/week), water (1/2 of body weight in ounces) and avoiding potatoes, sugar sweetened beverages, red/processed meats (including chicken), fast food, grains (rice/bread/pasta); and increasing yogurt, whole fruits, unsalted nuts to 3-5 servings/day, and daily weight logging; non-starchy vegetables, cooked beans, and un-fried seafood have " weak effects on weight.   5. Vitamin D deficiency: Will check.   6. Screening for breast cancer: Will check sMMG since her recent breast exam with OB/GYN was WNL.    7. Right foot pain: Likely primarily plantar fasciitis. I recommended over-the-counter turmeric 500 mg 2 capsules by mouth daily (with a meal) and/or tylenol 650 mg by mouth three times daily as needed. I recommend avoiding chronic (>2 weeks) NSAIDS (nonsteroidal inflammatory drugs; i.e. Ibuprofen, Motrin, Advil, Aleve, Naprosyn, naproxen, Aspirin, Goodies, Stanback, BC's powder, oral diclofenac, Mobic, meloxicam, etc.) to protect your stomach from bleeding and to your kidneys from dysfunction. She had physical therapy and saw a podiatrist, which hasn't helped.        Plan:   Essential hypertension  -     Comprehensive metabolic panel; Future; Expected date: 03/05/2019  -     amLODIPine (NORVASC) 5 MG tablet; Take 1 tablet (5 mg total) by mouth once daily.  Dispense: 30 tablet; Refill: 11    Type 2 diabetes mellitus with hyperosmolarity without coma, without long-term current use of insulin  -     CBC auto differential; Future; Expected date: 03/05/2019  -     Hemoglobin A1c; Future; Expected date: 03/05/2019  -     Microalbumin/creatinine urine ratio  -     metFORMIN (GLUCOPHAGE) 500 MG tablet; Take 1 tablet (500 mg total) by mouth daily with breakfast.  Dispense: 60 tablet; Refill: 11    Mixed hyperlipidemia  -     Lipid panel; Future; Expected date: 03/05/2019    Obesity (BMI 30.0-34.9)    Vitamin D deficiency  -     Vitamin D; Future; Expected date: 03/05/2019    Screening for breast cancer  -     Mammo Digital Screening Bilat; Future; Expected date: 03/05/2019    Right foot pain    Peripheral polyneuropathy  -     gabapentin (NEURONTIN) 100 MG capsule; Take 1 capsule (100 mg total) by mouth 3 (three) times daily.  Dispense: 30 capsule; Refill: 11    Bilateral foot pain  -     diclofenac sodium (VOLTAREN) 1 % Gel; Apply 2 g topically 4 (four) times  daily. for 10 days  Dispense: 100 g; Refill: 11    Hyperglycemia  -     metFORMIN (GLUCOPHAGE) 500 MG tablet; Take 1 tablet (500 mg total) by mouth daily with breakfast.  Dispense: 60 tablet; Refill: 11    Screening for colon cancer  -     Case request GI: COLONOSCOPY        Follow-up in about 2 years (around 3/5/2021), or if symptoms worsen or fail to improve, for FU on HTN, foot pain.    I spent 25 minutes of time with patient 50% or more of which was discussing labs and plans of care.

## 2019-03-08 ENCOUNTER — LAB VISIT (OUTPATIENT)
Dept: LAB | Facility: HOSPITAL | Age: 59
End: 2019-03-08
Attending: INTERNAL MEDICINE

## 2019-03-08 DIAGNOSIS — E11.9 TYPE 2 DIABETES MELLITUS WITHOUT COMPLICATION: ICD-10-CM

## 2019-03-08 DIAGNOSIS — E78.2 MIXED HYPERLIPIDEMIA: ICD-10-CM

## 2019-03-08 DIAGNOSIS — E11.00 TYPE 2 DIABETES MELLITUS WITH HYPEROSMOLARITY WITHOUT COMA, WITHOUT LONG-TERM CURRENT USE OF INSULIN: ICD-10-CM

## 2019-03-08 DIAGNOSIS — E55.9 VITAMIN D DEFICIENCY: ICD-10-CM

## 2019-03-08 DIAGNOSIS — I10 ESSENTIAL HYPERTENSION: ICD-10-CM

## 2019-03-08 LAB
25(OH)D3+25(OH)D2 SERPL-MCNC: 39 NG/ML
ALBUMIN SERPL BCP-MCNC: 4.1 G/DL
ALP SERPL-CCNC: 73 U/L
ALT SERPL W/O P-5'-P-CCNC: 28 U/L
ANION GAP SERPL CALC-SCNC: 10 MMOL/L
AST SERPL-CCNC: 25 U/L
BASOPHILS # BLD AUTO: 0.03 K/UL
BASOPHILS NFR BLD: 0.6 %
BILIRUB SERPL-MCNC: 0.6 MG/DL
BUN SERPL-MCNC: 10 MG/DL
CALCIUM SERPL-MCNC: 9.7 MG/DL
CHLORIDE SERPL-SCNC: 100 MMOL/L
CHOLEST SERPL-MCNC: 173 MG/DL
CHOLEST/HDLC SERPL: 4.7 {RATIO}
CO2 SERPL-SCNC: 28 MMOL/L
CREAT SERPL-MCNC: 1 MG/DL
DIFFERENTIAL METHOD: NORMAL
EOSINOPHIL # BLD AUTO: 0.1 K/UL
EOSINOPHIL NFR BLD: 2 %
ERYTHROCYTE [DISTWIDTH] IN BLOOD BY AUTOMATED COUNT: 13.1 %
EST. GFR  (AFRICAN AMERICAN): >60 ML/MIN/1.73 M^2
EST. GFR  (NON AFRICAN AMERICAN): >60 ML/MIN/1.73 M^2
ESTIMATED AVG GLUCOSE: 269 MG/DL
GLUCOSE SERPL-MCNC: 306 MG/DL
HBA1C MFR BLD HPLC: 11 %
HCT VFR BLD AUTO: 43.1 %
HDLC SERPL-MCNC: 37 MG/DL
HDLC SERPL: 21.4 %
HGB BLD-MCNC: 14 G/DL
IMM GRANULOCYTES # BLD AUTO: 0.01 K/UL
IMM GRANULOCYTES NFR BLD AUTO: 0.2 %
LDLC SERPL CALC-MCNC: 115.6 MG/DL
LYMPHOCYTES # BLD AUTO: 1.5 K/UL
LYMPHOCYTES NFR BLD: 29.8 %
MCH RBC QN AUTO: 29 PG
MCHC RBC AUTO-ENTMCNC: 32.5 G/DL
MCV RBC AUTO: 89 FL
MONOCYTES # BLD AUTO: 0.4 K/UL
MONOCYTES NFR BLD: 7.8 %
NEUTROPHILS # BLD AUTO: 3 K/UL
NEUTROPHILS NFR BLD: 59.6 %
NONHDLC SERPL-MCNC: 136 MG/DL
NRBC BLD-RTO: 0 /100 WBC
PLATELET # BLD AUTO: 284 K/UL
PMV BLD AUTO: 11 FL
POTASSIUM SERPL-SCNC: 3.9 MMOL/L
PROT SERPL-MCNC: 7.6 G/DL
RBC # BLD AUTO: 4.83 M/UL
SODIUM SERPL-SCNC: 138 MMOL/L
TRIGL SERPL-MCNC: 102 MG/DL
WBC # BLD AUTO: 5.03 K/UL

## 2019-03-08 PROCEDURE — 80053 COMPREHEN METABOLIC PANEL: CPT

## 2019-03-08 PROCEDURE — 83036 HEMOGLOBIN GLYCOSYLATED A1C: CPT

## 2019-03-08 PROCEDURE — 80061 LIPID PANEL: CPT

## 2019-03-08 PROCEDURE — 82306 VITAMIN D 25 HYDROXY: CPT

## 2019-03-08 PROCEDURE — 36415 COLL VENOUS BLD VENIPUNCTURE: CPT | Mod: PO

## 2019-03-08 PROCEDURE — 85025 COMPLETE CBC W/AUTO DIFF WBC: CPT

## 2019-03-11 ENCOUNTER — TELEPHONE (OUTPATIENT)
Dept: INTERNAL MEDICINE | Facility: CLINIC | Age: 59
End: 2019-03-11

## 2019-03-11 DIAGNOSIS — R73.9 HYPERGLYCEMIA: ICD-10-CM

## 2019-03-11 DIAGNOSIS — E11.00 TYPE 2 DIABETES MELLITUS WITH HYPEROSMOLARITY WITHOUT COMA, WITHOUT LONG-TERM CURRENT USE OF INSULIN: ICD-10-CM

## 2019-03-11 RX ORDER — INSULIN PUMP SYRINGE, 3 ML
EACH MISCELLANEOUS
Qty: 1 EACH | Refills: 0 | Status: SHIPPED | OUTPATIENT
Start: 2019-03-11 | End: 2023-06-23

## 2019-03-11 RX ORDER — METFORMIN HYDROCHLORIDE 1000 MG/1
1000 TABLET ORAL 2 TIMES DAILY WITH MEALS
Qty: 180 TABLET | Refills: 3 | Status: SHIPPED | OUTPATIENT
Start: 2019-03-11 | End: 2020-01-29 | Stop reason: SDUPTHER

## 2019-03-11 NOTE — TELEPHONE ENCOUNTER
Spoke with patient. Informed her that Dr. Perez had sent a Rx for Insulin to her pharmacy,went over the dosage with her, and let her know a letter had been mailed out with the instructions. Patient verbalized understanding about the medication. This nurse also informed the patient that she could come in for a nurse visit and we could demonstrate and explain SQ injections to her. The patient stated she was going to get the pharmacy to explain and show her because she knew they did, but she would call if she needed us.

## 2019-03-11 NOTE — TELEPHONE ENCOUNTER
----- Message from Neha Uriostegui sent at 3/11/2019  3:16 PM CDT -----  Contact: pt  Type:  Test Results    Who Called:  pt  Name of Test (Lab/Mammo/Etc):  Blood work  Date of Test:    Ordering Provider:  abby  Where the test was performed:   Would the patient rather a call back or a response via [facility]?  Call back   Best Call Back Number: [phone] (home)   Additional Information:

## 2019-03-12 ENCOUNTER — TELEPHONE (OUTPATIENT)
Dept: ENDOSCOPY | Facility: HOSPITAL | Age: 59
End: 2019-03-12

## 2019-03-19 ENCOUNTER — PATIENT OUTREACH (OUTPATIENT)
Dept: ADMINISTRATIVE | Facility: HOSPITAL | Age: 59
End: 2019-03-19

## 2019-03-19 NOTE — LETTER
March 19, 2019        Pricilla Bower  [address] [address]      Dear Mrs. Bower,    You have an upcoming appointment with Ramiro Perez MD on 04/02/09.      Your chart is indicating you may be due for the following and I will be happy to assist you in scheduling any needed appointments:  Health Maintenance Due   Topic    Eye Exam     Pneumococcal Vaccine (Medium Risk) (1 of 1 - PPSV23)    Mammogram     Colonoscopy     Urine Microalbumin           If you have had any of the above done at another facility, please bring the records or information with you so that your record at [facility] will be complete.    We will be happy to assist you with scheduling any necessary appointments or you may contact the [facility] appointment desk at [phone] to schedule at your convenience.     Thank you for choosing [facility] for your healthcare needs,      Sherie C., LPN Care Coordinator  [facility]  [phone]

## 2019-04-03 ENCOUNTER — OFFICE VISIT (OUTPATIENT)
Dept: INTERNAL MEDICINE | Facility: CLINIC | Age: 59
End: 2019-04-03

## 2019-04-03 VITALS
HEART RATE: 82 BPM | DIASTOLIC BLOOD PRESSURE: 80 MMHG | HEIGHT: 66 IN | RESPIRATION RATE: 16 BRPM | OXYGEN SATURATION: 99 % | SYSTOLIC BLOOD PRESSURE: 122 MMHG | TEMPERATURE: 98 F | WEIGHT: 181.19 LBS | BODY MASS INDEX: 29.12 KG/M2

## 2019-04-03 DIAGNOSIS — M79.671 BILATERAL FOOT PAIN: ICD-10-CM

## 2019-04-03 DIAGNOSIS — E11.42 PERIPHERAL SENSORY NEUROPATHY DUE TO TYPE 2 DIABETES MELLITUS: ICD-10-CM

## 2019-04-03 DIAGNOSIS — I10 ESSENTIAL HYPERTENSION: ICD-10-CM

## 2019-04-03 DIAGNOSIS — Z79.4 TYPE 2 DIABETES MELLITUS WITH HYPEROSMOLARITY WITHOUT COMA, WITH LONG-TERM CURRENT USE OF INSULIN: Primary | ICD-10-CM

## 2019-04-03 DIAGNOSIS — E66.3 OVERWEIGHT (BMI 25.0-29.9): ICD-10-CM

## 2019-04-03 DIAGNOSIS — E11.00 TYPE 2 DIABETES MELLITUS WITH HYPEROSMOLARITY WITHOUT COMA, WITH LONG-TERM CURRENT USE OF INSULIN: Primary | ICD-10-CM

## 2019-04-03 DIAGNOSIS — M79.672 BILATERAL FOOT PAIN: ICD-10-CM

## 2019-04-03 PROCEDURE — 99213 OFFICE O/P EST LOW 20 MIN: CPT | Mod: PBBFAC,PN | Performed by: INTERNAL MEDICINE

## 2019-04-03 PROCEDURE — 99999 PR PBB SHADOW E&M-EST. PATIENT-LVL III: ICD-10-PCS | Mod: PBBFAC,,, | Performed by: INTERNAL MEDICINE

## 2019-04-03 PROCEDURE — 99215 PR OFFICE/OUTPT VISIT, EST, LEVL V, 40-54 MIN: ICD-10-PCS | Mod: S$PBB,,, | Performed by: INTERNAL MEDICINE

## 2019-04-03 PROCEDURE — 99215 OFFICE O/P EST HI 40 MIN: CPT | Mod: S$PBB,,, | Performed by: INTERNAL MEDICINE

## 2019-04-03 PROCEDURE — 99999 PR PBB SHADOW E&M-EST. PATIENT-LVL III: CPT | Mod: PBBFAC,,, | Performed by: INTERNAL MEDICINE

## 2019-04-03 RX ORDER — NAPROXEN SODIUM 220 MG/1
81 TABLET, FILM COATED ORAL DAILY
Refills: 0 | COMMUNITY
Start: 2019-04-03 | End: 2021-05-13 | Stop reason: SDUPTHER

## 2019-04-03 NOTE — PROGRESS NOTES
"Subjective:      Patient ID: Pricilla Bower is a 58 y.o. female.    Chief Complaint: Follow-up      HPI     Ms. Pricilla Bower is a patient of Ramiro Perez MD, who presents for Follow-up  FU on DM2, HTN, and foot pain.    She reports her usual foot pains, particularly in her right foot, which she describes as "shooting" from her toe up to her upper foot. She also reports persistent numbness and tingling of her feet despite taking gabapentin 100 mg BID. She reports not being able to stand or walk for prolonged periods of time due to her pain.     VS, labs & imaging reviewed and discussed with patient, including 14 lb wt loss from 3/5/19-4/3/19.      Past Medical History:   Diagnosis Date    Diabetes mellitus, type 2 2010    Hyperlipidemia     Hypertension     Obesity (BMI 30.0-34.9)     Vitamin D deficiency      Past Surgical History:   Procedure Laterality Date    NO PAST SURGERIES       Social History     Socioeconomic History    Marital status:      Spouse name: Not on file    Number of children: Not on file    Years of education: Not on file    Highest education level: Not on file   Occupational History    Not on file   Social Needs    Financial resource strain: Not on file    Food insecurity:     Worry: Not on file     Inability: Not on file    Transportation needs:     Medical: Not on file     Non-medical: Not on file   Tobacco Use    Smoking status: Never Smoker    Smokeless tobacco: Never Used   Substance and Sexual Activity    Alcohol use: No    Drug use: No    Sexual activity: Yes     Partners: Male     Comment:  16 years to her    Lifestyle    Physical activity:     Days per week: Not on file     Minutes per session: Not on file    Stress: Not on file   Relationships    Social connections:     Talks on phone: Not on file     Gets together: Not on file     Attends Synagogue service: Not on file     Active member of club or organization: Not on file     " Attends meetings of clubs or organizations: Not on file     Relationship status: Not on file   Other Topics Concern    Not on file   Social History Narrative    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[employer], furniture store, [employer]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 [employer] full-time)    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[employer]: 1 plain biscuit w/ grape jelly; Sprite    Lunch: 3p: Salad or ribs from [employer]; Sweetened tea    Dinner: Banana or other fruit; water    Snacks: None    Eats out: 7d/wk    Water: 100 oz/day (started 3 days ago)    PA: None    Goal weight is 170 lbs by 12/31/17     Family History   Problem Relation Age of Onset    Cancer Mother         cervical    Breast cancer Mother        Current Outpatient Medications:     amLODIPine (NORVASC) 5 MG tablet, Take 1 tablet (5 mg total) by mouth once daily., Disp: 30 tablet, Rfl: 11    atorvastatin (LIPITOR) 40 MG tablet, Take 1 tablet (40 mg total) by mouth every evening., Disp: 90 tablet, Rfl: 3    butalbital-acetaminophen-caffeine -40 mg (FIORICET, ESGIC) -40 mg per tablet, Take 1 tablet by mouth every 6 (six) hours as needed for Headaches., Disp: 20 tablet, Rfl: 0    gabapentin (NEURONTIN) 100 MG capsule, Take 1 capsule (100 mg total) by mouth 3 (three) times daily., Disp: 30 capsule, Rfl: 11    insulin detemir U-100 (LEVEMIR FLEXTOUCH U-100 INSULN) 100 unit/mL (3 mL) SubQ InPn pen, Inject 7 Units into the skin every evening., Disp: 3 mL, Rfl: 11    metFORMIN (GLUCOPHAGE) 1000 MG tablet, Take 1 tablet (1,000 mg total) by mouth 2 (two) times daily with meals., Disp: 180 tablet, Rfl: 3    UBIDECARENONE (COENZYME Q10) 100 mg Tab, Take 1 tablet (100 mg total) by mouth once daily., Disp: , Rfl:     aspirin 81 MG Chew, Take 1 tablet (81 mg total) by mouth once daily., Disp: , Rfl: 0    blood-glucose meter (FREESTYLE SYSTEM KIT) kit, Use as instructed, Disp: 1 each, Rfl: 0    diclofenac sodium (VOLTAREN) 1 % Gel, Apply 2 g topically 4 (four) times daily. for 10 days, Disp: 100  "g, Rfl: 11    Review of patient's allergies indicates:   Allergen Reactions    Darvocet a500 [propoxyphene n-acetaminophen] Nausea And Vomiting    Penicillins         Review of Systems   All remaining systems negative    Objective:     /80 (BP Location: Left arm, Patient Position: Sitting, BP Method: Large (Manual))   Pulse 82   Temp 98.4 °F (36.9 °C) (Oral)   Resp 16   Ht 5' 6" (1.676 m)   Wt 82.2 kg (181 lb 3.5 oz)   SpO2 99%   BMI 29.25 kg/m²     Physical Exam  GEN: A&O fully, NAD  PSYC: Normal affect      Lab Results   Component Value Date    WBC 5.03 03/08/2019    HGB 14.0 03/08/2019    HCT 43.1 03/08/2019     03/08/2019    CHOL 173 03/08/2019    TRIG 102 03/08/2019    HDL 37 (L) 03/08/2019    LDLCALC 115.6 03/08/2019    ALT 28 03/08/2019    AST 25 03/08/2019     03/08/2019    K 3.9 03/08/2019     03/08/2019    CREATININE 1.0 03/08/2019    BUN 10 03/08/2019    CO2 28 03/08/2019    CALCIUM 9.7 03/08/2019    HGBA1C 11.0 (H) 03/08/2019       Assessment:      1. Type 2 diabetes mellitus with hyperosmolarity without coma, with long-term current use of insulin: Uncontrolled. If 7 day average fasting glucose greater than 140, increase long-acting by 7 units for the next 7 days and if 3d ave FBG <70 mg/dL to decrease long acting insulin by 3 U for the next 3 day. She understands how to do this and provided written instructions.   2. Essential hypertension: Stable. Continue current medical regimen.   3. Overweight (BMI 25.0-29.9): Congratulated her on her 14 lb wt loss over the past 1 month! Discussed strategies for lifestyle modifications, particularly systematically increasing upper extremity physical activity (5-90 min/episode, 3-5 times/week), water (1/2 of body weight in ounces) and avoiding potatoes, sugar sweetened beverages, red/processed meats (including chicken), fast food, grains (rice/bread/pasta); and increasing yogurt, whole fruits, unsalted nuts to 3-5 servings/day, and " daily weight logging; non-starchy vegetables, cooked beans, and un-fried seafood is OK.   4. Bilateral foot pain: Due to diabetes. Risks and benefits discussed and patient chose to move forward with increasing her gabapentin 100 mg po BID, which she understands how to titrate up by 100 mg every 4-7 days until pain is controlled or side effects are prohibitive. Provided written instructions as well.   5. Peripheral sensory neuropathy due to type 2 diabetes mellitus: Patient is unable to work currently due to complication of diabetes.       Plan:   Type 2 diabetes mellitus with hyperosmolarity without coma, with long-term current use of insulin  -     Ambulatory consult to Optometry  -     aspirin 81 MG Chew; Take 1 tablet (81 mg total) by mouth once daily.; Refill: 0    Essential hypertension    Overweight (BMI 25.0-29.9)    Bilateral foot pain    Peripheral sensory neuropathy due to type 2 diabetes mellitus      Follow up in about 3 months (around 7/3/2019), or if symptoms worsen or fail to improve, for FU on DM2.    I spent 45 minutes of time with patient 50% or more of which was discussing labs and plans of care.

## 2019-04-05 ENCOUNTER — TELEPHONE (OUTPATIENT)
Dept: INTERNAL MEDICINE | Facility: CLINIC | Age: 59
End: 2019-04-05

## 2019-04-05 NOTE — TELEPHONE ENCOUNTER
Patient needs a letter addressed to [employer], Attention:  Ms. Liu.  The letter needs to state that patient is no longer able to work due to Peripheral Diabetic Neuropathy in bilateral feet and that patient is currently under Neurontin therapy which causes fatigue.  Please refer to your last note.

## 2019-04-05 NOTE — TELEPHONE ENCOUNTER
----- Message from Heather Tyler sent at 4/5/2019  3:08 PM CDT -----  Contact: self   Requesting a written statement for a personal matter.Please call back at [phone].      Thanks,  Heather Tyler

## 2019-04-08 ENCOUNTER — TELEPHONE (OUTPATIENT)
Dept: INTERNAL MEDICINE | Facility: CLINIC | Age: 59
End: 2019-04-08

## 2019-04-18 ENCOUNTER — PATIENT OUTREACH (OUTPATIENT)
Dept: ADMINISTRATIVE | Facility: HOSPITAL | Age: 59
End: 2019-04-18

## 2019-04-18 NOTE — PROGRESS NOTES
Spoke with pt re scheduling appt with Dr. Perez for DM and HTN. Pt scheduled for  06/13/19. Instructed pt on appt. Pt verbalized understanding.

## 2019-05-17 ENCOUNTER — PATIENT OUTREACH (OUTPATIENT)
Dept: ADMINISTRATIVE | Facility: HOSPITAL | Age: 59
End: 2019-05-17

## 2019-05-17 NOTE — PROGRESS NOTES
I have attempted to contact patient to schedule dm eye exam. Patient will call back to schedule appointment.

## 2019-05-30 ENCOUNTER — PATIENT OUTREACH (OUTPATIENT)
Dept: ADMINISTRATIVE | Facility: HOSPITAL | Age: 59
End: 2019-05-30

## 2019-05-30 NOTE — LETTER
May 30, 2019        Pricilla Bower  [address] [facility] [id number]      Dear Mrs. Bower,    You have an upcoming appointment with Ramiro Perez MD on 06/13/19.      Your chart is indicating you may be due for the following and I will be happy to assist you in scheduling any needed appointments:  Health Maintenance Due   Topic    Eye Exam     Mammogram     Colonoscopy     Foot Exam     Urine Microalbumin           If you have had any of the above done at another facility, please bring the records or information with you so that your record at [facility] will be complete.    We will be happy to assist you with scheduling any necessary appointments or you may contact the [facility] appointment desk at [phone] to schedule at your convenience.     Thank you for choosing [facility] for your healthcare needs,      Sherie DOCKERY LPN Care Coordinator  [facility]  [phone]

## 2019-06-12 ENCOUNTER — PATIENT OUTREACH (OUTPATIENT)
Dept: ADMINISTRATIVE | Facility: HOSPITAL | Age: 59
End: 2019-06-12

## 2019-06-17 ENCOUNTER — PATIENT OUTREACH (OUTPATIENT)
Dept: ADMINISTRATIVE | Facility: HOSPITAL | Age: 59
End: 2019-06-17

## 2019-07-03 DIAGNOSIS — E78.5 HYPERLIPIDEMIA, UNSPECIFIED HYPERLIPIDEMIA TYPE: ICD-10-CM

## 2019-07-03 DIAGNOSIS — E78.2 MIXED HYPERLIPIDEMIA: ICD-10-CM

## 2019-07-03 RX ORDER — ATORVASTATIN CALCIUM 40 MG/1
40 TABLET, FILM COATED ORAL NIGHTLY
Qty: 90 TABLET | Refills: 3 | Status: SHIPPED | OUTPATIENT
Start: 2019-07-03 | End: 2019-07-15 | Stop reason: SDUPTHER

## 2019-07-08 ENCOUNTER — PATIENT OUTREACH (OUTPATIENT)
Dept: ADMINISTRATIVE | Facility: HOSPITAL | Age: 59
End: 2019-07-08

## 2019-07-08 NOTE — PROGRESS NOTES
Spoke with pt re scheduling appt with Dr. Perez  for DM/HTN/Overdue labs. Appt scheduled 08/27/19. Instructed pt on appt. Pt verbalized understanding.

## 2019-07-15 DIAGNOSIS — E78.2 MIXED HYPERLIPIDEMIA: ICD-10-CM

## 2019-07-15 DIAGNOSIS — E78.5 HYPERLIPIDEMIA, UNSPECIFIED HYPERLIPIDEMIA TYPE: ICD-10-CM

## 2019-07-15 RX ORDER — ATORVASTATIN CALCIUM 40 MG/1
40 TABLET, FILM COATED ORAL NIGHTLY
Qty: 90 TABLET | Refills: 3 | Status: SHIPPED | OUTPATIENT
Start: 2019-07-15 | End: 2020-08-07 | Stop reason: SDUPTHER

## 2019-07-15 NOTE — TELEPHONE ENCOUNTER
LOV 4/3/2019, received refill request from [facility] [address] [place] [facility] for 90 day supply atorvastaton 40mg. Please advise.

## 2019-07-19 ENCOUNTER — PATIENT OUTREACH (OUTPATIENT)
Dept: ADMINISTRATIVE | Facility: HOSPITAL | Age: 59
End: 2019-07-19

## 2019-08-08 ENCOUNTER — PATIENT OUTREACH (OUTPATIENT)
Dept: ADMINISTRATIVE | Facility: HOSPITAL | Age: 59
End: 2019-08-08

## 2019-08-13 ENCOUNTER — PATIENT OUTREACH (OUTPATIENT)
Dept: ADMINISTRATIVE | Facility: HOSPITAL | Age: 59
End: 2019-08-13

## 2019-08-13 NOTE — LETTER
August 13, 2019        Pricilla Bower  [id number]  [id number]      Dear Ms. Bower,    You have an upcoming appointment with Ramiro Perez MD on 08/27/19.      Your chart is indicating you may be due for the following and I will be happy to assist you in scheduling any needed appointments:  Health Maintenance Due   Topic    Eye Exam     Mammogram     Colonoscopy     Foot Exam     Urine Microalbumin           If you have had any of the above done at another facility, please bring the records or information with you so that your record at [facility] will be complete.    We will be happy to assist you with scheduling any necessary appointments or you may contact the [facility] appointment desk at [phone] to schedule at your convenience.     Thank you for choosing [facility] for your healthcare needs,      Sherie DOCKERY LPN Care Coordinator  [facility]  [phone]

## 2019-08-22 ENCOUNTER — PATIENT OUTREACH (OUTPATIENT)
Dept: ADMINISTRATIVE | Facility: HOSPITAL | Age: 59
End: 2019-08-22

## 2019-08-22 NOTE — PROGRESS NOTES
Spoke with patient family member Re scheduling Mammogram. Family member states pt will call back to [facility] mammo.

## 2019-09-05 ENCOUNTER — PATIENT OUTREACH (OUTPATIENT)
Dept: ADMINISTRATIVE | Facility: HOSPITAL | Age: 59
End: 2019-09-05

## 2019-09-05 NOTE — PROGRESS NOTES
Spoke with pt re scheduling MMG, lab and OV with Dr. Perez. Pt stated does not have ins at this time. Pt will call back to schedule appt once has ins.

## 2019-10-17 DIAGNOSIS — E11.9 TYPE 2 DIABETES MELLITUS WITHOUT COMPLICATION: ICD-10-CM

## 2019-11-04 ENCOUNTER — PATIENT OUTREACH (OUTPATIENT)
Dept: ADMINISTRATIVE | Facility: HOSPITAL | Age: 59
End: 2019-11-04

## 2019-11-27 ENCOUNTER — PATIENT OUTREACH (OUTPATIENT)
Dept: ADMINISTRATIVE | Facility: HOSPITAL | Age: 59
End: 2019-11-27

## 2019-12-17 ENCOUNTER — PATIENT OUTREACH (OUTPATIENT)
Dept: ADMINISTRATIVE | Facility: HOSPITAL | Age: 59
End: 2019-12-17

## 2019-12-17 NOTE — PROGRESS NOTES
Spoke with pt re scheduling appt with Dr. Perez for HTN/Overdue HM. Appt scheduled 01/14/20. Instructed pt on appt. Pt verbalized understanding

## 2019-12-31 ENCOUNTER — PATIENT OUTREACH (OUTPATIENT)
Dept: ADMINISTRATIVE | Facility: HOSPITAL | Age: 59
End: 2019-12-31

## 2020-01-22 ENCOUNTER — NURSE TRIAGE (OUTPATIENT)
Dept: ADMINISTRATIVE | Facility: CLINIC | Age: 60
End: 2020-01-22

## 2020-01-22 NOTE — TELEPHONE ENCOUNTER
Pt states CBG has increased to 553. Pt advised per protocol to ED now and pt verbalizes understanding.     Reason for Disposition   Blood glucose > 500 mg/dl (27.5 mmol/l)    Additional Information   Negative: Unconscious or difficult to awaken   Negative: Acting confused (e.g., disoriented, slurred speech)   Negative: Very weak (e.g., can't stand)   Negative: Sounds like a life-threatening emergency to the triager   Negative: [1] Vomiting AND [2] signs of dehydration (e.g., very dry mouth, lightheaded, etc.)   Negative: [1] Blood glucose > 240 mg/dl (13 mmol/l) AND [2] rapid breathing    Protocols used: DIABETES - HIGH BLOOD SUGAR-A-AH

## 2020-01-28 ENCOUNTER — NURSE TRIAGE (OUTPATIENT)
Dept: ADMINISTRATIVE | Facility: CLINIC | Age: 60
End: 2020-01-28

## 2020-01-28 NOTE — TELEPHONE ENCOUNTER
Reason for Disposition   Blood glucose > 300 mg/dl (16.7 mmol/l) AND two or more times in a row    Additional Information   Negative: Unconscious or difficult to awaken   Negative: Very weak (can't stand)   Negative: Acting confused (e.g., disoriented, slurred speech)   Negative: Sounds like a life-threatening emergency to the triager   Negative: Vomiting and signs of dehydration (e.g., very dry mouth, lightheaded, etc.)   Negative: Blood glucose > 240 mg/dl (13 mmol/l) and rapid breathing   Negative: Blood glucose > 500 mg/dl (27.5 mmol/l)   Negative: Blood glucose > 240 mg/dl (13 mmol/l) AND urine ketones moderate-large (or more than 1+)   Negative: Blood glucose > 240 mg/dl (13 mmol/l) and blood ketones > 1.5 mmol/l   Negative: Blood glucose > 240 mg/dl (13 mmol/l) AND vomiting AND unable to check for ketones (in blood or urine)   Negative: Vomiting lasting > 4 hours   Negative: Patient sounds very sick or weak to the triager   Negative: Fever > 100.5 F (38.1 C)   Negative: Caller has URGENT medication or insulin pump question and triager unable to answer question   Negative: Blood glucose > 400 mg/dl (22 mmol/l)    Protocols used: DIABETES - HIGH BLOOD SUGAR-A-OH     Pt stated her blood sugar has been 356 or greater since Monday. Pt stated she is currently taking Metformin 500 mg 1 tab daily. Pt was seen in Er  In  Jan of this year and it looks like she was taking Metfomin 500 mg 1 tab 2 times daily. Medication listed in epic says Metformin 1000 mg 2 times daily. Pt stated bs has been greater than 300 more than 2 times in a row. Care advice recommend pt receive a call back from Md within 1 hour. Please call and advise patient.

## 2020-01-29 ENCOUNTER — OFFICE VISIT (OUTPATIENT)
Dept: INTERNAL MEDICINE | Facility: CLINIC | Age: 60
End: 2020-01-29

## 2020-01-29 ENCOUNTER — LAB VISIT (OUTPATIENT)
Dept: LAB | Facility: HOSPITAL | Age: 60
End: 2020-01-29
Attending: INTERNAL MEDICINE

## 2020-01-29 VITALS
WEIGHT: 184.75 LBS | HEIGHT: 66 IN | DIASTOLIC BLOOD PRESSURE: 78 MMHG | BODY MASS INDEX: 29.69 KG/M2 | TEMPERATURE: 98 F | SYSTOLIC BLOOD PRESSURE: 128 MMHG

## 2020-01-29 DIAGNOSIS — E11.00 TYPE 2 DIABETES MELLITUS WITH HYPEROSMOLARITY WITHOUT COMA, WITHOUT LONG-TERM CURRENT USE OF INSULIN: ICD-10-CM

## 2020-01-29 DIAGNOSIS — Z12.11 SCREENING FOR COLON CANCER: Primary | ICD-10-CM

## 2020-01-29 PROCEDURE — 99213 PR OFFICE/OUTPT VISIT, EST, LEVL III, 20-29 MIN: ICD-10-PCS | Mod: S$PBB,,, | Performed by: INTERNAL MEDICINE

## 2020-01-29 PROCEDURE — 83036 HEMOGLOBIN GLYCOSYLATED A1C: CPT

## 2020-01-29 PROCEDURE — 99999 PR PBB SHADOW E&M-EST. PATIENT-LVL III: CPT | Mod: PBBFAC,,, | Performed by: INTERNAL MEDICINE

## 2020-01-29 PROCEDURE — 99999 PR PBB SHADOW E&M-EST. PATIENT-LVL III: ICD-10-PCS | Mod: PBBFAC,,, | Performed by: INTERNAL MEDICINE

## 2020-01-29 PROCEDURE — 99213 OFFICE O/P EST LOW 20 MIN: CPT | Mod: S$PBB,,, | Performed by: INTERNAL MEDICINE

## 2020-01-29 PROCEDURE — 99213 OFFICE O/P EST LOW 20 MIN: CPT | Mod: PBBFAC,PN | Performed by: INTERNAL MEDICINE

## 2020-01-29 PROCEDURE — 36415 COLL VENOUS BLD VENIPUNCTURE: CPT | Mod: PO

## 2020-01-29 RX ORDER — METFORMIN HYDROCHLORIDE 1000 MG/1
1000 TABLET ORAL 2 TIMES DAILY WITH MEALS
Qty: 180 TABLET | Refills: 3 | Status: SHIPPED | OUTPATIENT
Start: 2020-01-29 | End: 2021-03-12 | Stop reason: SDUPTHER

## 2020-01-29 NOTE — PROGRESS NOTES
"Subjective:      Patient ID: Pricilla Bower is a 59 y.o. female.    Chief Complaint: Blood Sugar Problem      HPI     Ms. Pricilla Bower is a patient of Ramiro Perez MD, who presents for FU on DM2.      On last visit, she reported her usual foot pains (R>L; "shooting" from her toe up to her upper foot; precluding standing or walking for prolonged periods of time), numbness and tingling despite taking gabapentin 100 mg BID.    VS, labs & imaging reviewed and discussed with patient, including +3 lb from 4/3/19-1/29/20 & -14 lb wt change from 3/5/19-4/3/19; A1c 11%, HDL 37 mg/dL on 3/8/19, for which she was called by LPN to convey I recommend increasing her metformin from 500 mg qd to 1000 mg BID and starting and titrating up Lantus insulin as follows: If 7 day average fasting glucose greater than 140, increase long-acting insulin by 7 units for the next 7 days and if 3d ave FBG <70 mg/dL to decrease long acting insulin by 3 U for the next 3 day, which she voiced understanding and preferred to f/u with pharmacist RE: administration of insulin.    Of note, EPIC indicates due preventive measures, including FOOT, EYE, HIV, TDAP, PPSV23, sCRC & shingles.      Past Medical History:   Diagnosis Date    Diabetes mellitus, type 2 2010    A1c 11% on 3/8/19; Lost to F/U as of 7/2019; to ER [facility] glu >[facility] 1/23/20    Hyperlipidemia     Hypertension     Noncompliance with treatment plan     Uncontrolled DM2 due to noncompliance with insulin and follow up; A1c 11% on 3/8/19; Lost to F/U as of 7/2019; to ER for glu >500 at [facility] 1/23/20    Obesity (BMI 30.0-34.9)     Vitamin D deficiency      Past Surgical History:   Procedure Laterality Date    NO PAST SURGERIES       Social History     Socioeconomic History    Marital status:      Spouse name: Not on file    Number of children: Not on file    Years of education: Not on file    Highest education level: Not on file   Occupational History    Not on " file   Social Needs    Financial resource strain: Not on file    Food insecurity:     Worry: Not on file     Inability: Not on file    Transportation needs:     Medical: Not on file     Non-medical: Not on file   Tobacco Use    Smoking status: Never Smoker    Smokeless tobacco: Never Used   Substance and Sexual Activity    Alcohol use: No    Drug use: No    Sexual activity: Yes     Partners: Male     Comment:  16 years to her    Lifestyle    Physical activity:     Days per week: Not on file     Minutes per session: Not on file    Stress: Not on file   Relationships    Social connections:     Talks on phone: Not on file     Gets together: Not on file     Attends Confucianist service: Not on file     Active member of club or organization: Not on file     Attends meetings of clubs or organizations: Not on file     Relationship status: Not on file   Other Topics Concern    Not on file   Social History Narrative    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[employer], furniture store, [employer]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)    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: 1 plain biscuit w/ grape jelly; Sprite    Lunch: 3p: Salad or ribs from [employer]; Sweetened tea    Dinner: Banana or other fruit; water    Snacks: None    Eats out: 7d/wk    Water: 100 oz/day (started 3 days ago)    PA: None    Goal weight is 170 lbs by 12/31/17     Family History   Problem Relation Age of Onset    Cancer Mother         cervical    Breast cancer Mother        Current Outpatient Medications:     amLODIPine (NORVASC) 5 MG tablet, Take 1 tablet (5 mg total) by mouth once daily., Disp: 30 tablet, Rfl: 11    aspirin 81 MG Chew, Take 1 tablet (81 mg total) by mouth once daily., Disp: , Rfl: 0    atorvastatin (LIPITOR) 40 MG tablet, Take 1 tablet (40 mg total) by mouth every evening., Disp: 90 tablet, Rfl: 3    blood-glucose meter (FREESTYLE SYSTEM KIT) kit, Use as instructed, Disp: 1 each, Rfl: 0    gabapentin (NEURONTIN) 100 MG capsule, Take 1  "capsule (100 mg total) by mouth 3 (three) times daily., Disp: 30 capsule, Rfl: 11    insulin detemir U-100 (LEVEMIR FLEXTOUCH U-100 INSULN) 100 unit/mL (3 mL) SubQ InPn pen, Inject 17 Units into the skin every evening., Disp: 3 mL, Rfl: 11    metFORMIN (GLUCOPHAGE) 1000 MG tablet, Take 1 tablet (1,000 mg total) by mouth 2 (two) times daily with meals., Disp: 180 tablet, Rfl: 3    UBIDECARENONE (COENZYME Q10) 100 mg Tab, Take 1 tablet (100 mg total) by mouth once daily., Disp: , Rfl:     butalbital-acetaminophen-caffeine -40 mg (FIORICET, ESGIC) -40 mg per tablet, Take 1 tablet by mouth every 6 (six) hours as needed for Headaches. (Patient not taking: Reported on 1/29/2020), Disp: 20 tablet, Rfl: 0    diclofenac sodium (VOLTAREN) 1 % Gel, Apply 2 g topically 4 (four) times daily. for 10 days, Disp: 100 g, Rfl: 11    Review of patient's allergies indicates:   Allergen Reactions    Darvocet a500 [propoxyphene n-acetaminophen] Nausea And Vomiting    Penicillins         Review of Systems   All remaining systems negative    Objective:     /78 (BP Location: Left arm, Patient Position: Sitting, BP Method: Large (Manual))   Temp 97.5 °F (36.4 °C) (Temporal)   Ht 5' 6" (1.676 m)   Wt 83.8 kg (184 lb 11.9 oz)   BMI 29.82 kg/m²     Physical Exam  GEN: A&O fully, NAD  PSYC: Normal affect  FEET: Visual inspection WNL; 8/8 sensation intact to 10 mm monofilament bilaterally; Feet warm to touch bilaterally; 2+ pulses bilaterally       Lab Results   Component Value Date    WBC 5.03 03/08/2019    HGB 14.0 03/08/2019    HCT 43.1 03/08/2019     03/08/2019    CHOL 173 03/08/2019    TRIG 102 03/08/2019    HDL 37 (L) 03/08/2019    LDLCALC 115.6 03/08/2019    ALT 28 03/08/2019    AST 25 03/08/2019     03/08/2019    K 3.9 03/08/2019     03/08/2019    CREATININE 1.0 03/08/2019    BUN 10 03/08/2019    CO2 28 03/08/2019    CALCIUM 9.7 03/08/2019    HGBA1C 11.0 (H) 03/08/2019       Assessment:    "   1. Type 2 diabetes mellitus with hyperosmolarity without coma, without long-term current use of insulin: Will check A1c today.        Plan:   Screening for colon cancer  -     Case request GI: COLONOSCOPY    Type 2 diabetes mellitus with hyperosmolarity without coma, without long-term current use of insulin  -     Discontinue: insulin detemir U-100 (LEVEMIR FLEXTOUCH U-100 INSULN) 100 unit/mL (3 mL) SubQ InPn pen; Inject 7 Units into the skin every evening.  Dispense: 3 mL; Refill: 11  -     metFORMIN (GLUCOPHAGE) 1000 MG tablet; Take 1 tablet (1,000 mg total) by mouth 2 (two) times daily with meals.  Dispense: 180 tablet; Refill: 3  -     insulin detemir U-100 (LEVEMIR FLEXTOUCH U-100 INSULN) 100 unit/mL (3 mL) SubQ InPn pen; Inject 17 Units into the skin every evening.  Dispense: 3 mL; Refill: 11  -     Hemoglobin A1c; Future; Expected date: 01/29/2020        Follow up in about 3 months (around 4/29/2020), or if symptoms worsen or fail to improve, for FU on DM2.

## 2020-01-30 ENCOUNTER — TELEPHONE (OUTPATIENT)
Dept: ENDOSCOPY | Facility: HOSPITAL | Age: 60
End: 2020-01-30

## 2020-01-30 DIAGNOSIS — E11.00 TYPE 2 DIABETES MELLITUS WITH HYPEROSMOLARITY WITHOUT COMA, WITHOUT LONG-TERM CURRENT USE OF INSULIN: ICD-10-CM

## 2020-01-30 LAB
ESTIMATED AVG GLUCOSE: ABNORMAL MG/DL (ref 68–131)
HBA1C MFR BLD HPLC: >14 % (ref 4–5.6)

## 2020-01-31 ENCOUNTER — TELEPHONE (OUTPATIENT)
Dept: INTERNAL MEDICINE | Facility: CLINIC | Age: 60
End: 2020-01-31

## 2020-01-31 NOTE — TELEPHONE ENCOUNTER
"----- Message from Ramiro Perez MD sent at 1/30/2020  4:35 PM CST -----  Please call the patient regarding her abnormal result: a1c >14%, for which I also recommend she increase her insulin detemir ("Levemir") from 17 units qPM to 17 units q AM and q PM (in addition to scheduling a f/u in 1 week to review her FBG log).    Please let us know if you have any questions or concerns.     Sincerely,  Ramiro Perez MD    "

## 2020-02-07 ENCOUNTER — PATIENT OUTREACH (OUTPATIENT)
Dept: ADMINISTRATIVE | Facility: HOSPITAL | Age: 60
End: 2020-02-07

## 2020-05-21 DIAGNOSIS — I10 ESSENTIAL HYPERTENSION: ICD-10-CM

## 2020-05-22 RX ORDER — AMLODIPINE BESYLATE 5 MG/1
TABLET ORAL
Qty: 30 TABLET | Refills: 5 | Status: SHIPPED | OUTPATIENT
Start: 2020-05-22 | End: 2021-03-12 | Stop reason: SDUPTHER

## 2020-08-06 ENCOUNTER — PATIENT OUTREACH (OUTPATIENT)
Dept: ADMINISTRATIVE | Facility: HOSPITAL | Age: 60
End: 2020-08-06

## 2020-08-06 NOTE — PROGRESS NOTES
Attempted to contact patient to schedule over due diabetic eye exam. Unable to reach patient at this time.

## 2020-08-07 ENCOUNTER — LAB VISIT (OUTPATIENT)
Dept: LAB | Facility: HOSPITAL | Age: 60
End: 2020-08-07
Attending: FAMILY MEDICINE
Payer: COMMERCIAL

## 2020-08-07 ENCOUNTER — OFFICE VISIT (OUTPATIENT)
Dept: INTERNAL MEDICINE | Facility: CLINIC | Age: 60
End: 2020-08-07
Payer: COMMERCIAL

## 2020-08-07 VITALS
HEIGHT: 66 IN | DIASTOLIC BLOOD PRESSURE: 68 MMHG | OXYGEN SATURATION: 99 % | HEART RATE: 67 BPM | TEMPERATURE: 98 F | BODY MASS INDEX: 29.71 KG/M2 | SYSTOLIC BLOOD PRESSURE: 157 MMHG | WEIGHT: 184.88 LBS

## 2020-08-07 DIAGNOSIS — E11.00 TYPE 2 DIABETES MELLITUS WITH HYPEROSMOLARITY WITHOUT COMA, WITH LONG-TERM CURRENT USE OF INSULIN: ICD-10-CM

## 2020-08-07 DIAGNOSIS — Z12.11 COLON CANCER SCREENING: Primary | ICD-10-CM

## 2020-08-07 DIAGNOSIS — Z11.4 ENCOUNTER FOR SCREENING FOR HIV: ICD-10-CM

## 2020-08-07 DIAGNOSIS — Z12.31 ENCOUNTER FOR SCREENING MAMMOGRAM FOR BREAST CANCER: ICD-10-CM

## 2020-08-07 DIAGNOSIS — Z79.4 TYPE 2 DIABETES MELLITUS WITH HYPEROSMOLARITY WITHOUT COMA, WITH LONG-TERM CURRENT USE OF INSULIN: ICD-10-CM

## 2020-08-07 DIAGNOSIS — E78.5 HYPERLIPIDEMIA, UNSPECIFIED HYPERLIPIDEMIA TYPE: ICD-10-CM

## 2020-08-07 DIAGNOSIS — E78.2 MIXED HYPERLIPIDEMIA: ICD-10-CM

## 2020-08-07 DIAGNOSIS — I10 ESSENTIAL HYPERTENSION: ICD-10-CM

## 2020-08-07 LAB
ALBUMIN SERPL BCP-MCNC: 4.1 G/DL (ref 3.5–5.2)
ALBUMIN/CREAT UR: 12.3 UG/MG (ref 0–30)
ALP SERPL-CCNC: 51 U/L (ref 55–135)
ALT SERPL W/O P-5'-P-CCNC: 27 U/L (ref 10–44)
ANION GAP SERPL CALC-SCNC: 8 MMOL/L (ref 8–16)
AST SERPL-CCNC: 24 U/L (ref 10–40)
BILIRUB SERPL-MCNC: 0.3 MG/DL (ref 0.1–1)
BUN SERPL-MCNC: 11 MG/DL (ref 8–23)
CALCIUM SERPL-MCNC: 9.4 MG/DL (ref 8.7–10.5)
CHLORIDE SERPL-SCNC: 105 MMOL/L (ref 95–110)
CHOLEST SERPL-MCNC: 225 MG/DL (ref 120–199)
CHOLEST/HDLC SERPL: 5.5 {RATIO} (ref 2–5)
CO2 SERPL-SCNC: 29 MMOL/L (ref 23–29)
CREAT SERPL-MCNC: 0.8 MG/DL (ref 0.5–1.4)
CREAT UR-MCNC: 130 MG/DL (ref 15–325)
EST. GFR  (AFRICAN AMERICAN): >60 ML/MIN/1.73 M^2
EST. GFR  (NON AFRICAN AMERICAN): >60 ML/MIN/1.73 M^2
GLUCOSE SERPL-MCNC: 131 MG/DL (ref 70–110)
HDLC SERPL-MCNC: 41 MG/DL (ref 40–75)
HDLC SERPL: 18.2 % (ref 20–50)
LDLC SERPL CALC-MCNC: 153 MG/DL (ref 63–159)
MICROALBUMIN UR DL<=1MG/L-MCNC: 16 UG/ML
NONHDLC SERPL-MCNC: 184 MG/DL
POTASSIUM SERPL-SCNC: 3.8 MMOL/L (ref 3.5–5.1)
PROT SERPL-MCNC: 7.7 G/DL (ref 6–8.4)
SODIUM SERPL-SCNC: 142 MMOL/L (ref 136–145)
TRIGL SERPL-MCNC: 155 MG/DL (ref 30–150)

## 2020-08-07 PROCEDURE — 99999 PR PBB SHADOW E&M-EST. PATIENT-LVL III: CPT | Mod: PBBFAC,,, | Performed by: FAMILY MEDICINE

## 2020-08-07 PROCEDURE — 82043 UR ALBUMIN QUANTITATIVE: CPT

## 2020-08-07 PROCEDURE — 36415 COLL VENOUS BLD VENIPUNCTURE: CPT | Mod: PO

## 2020-08-07 PROCEDURE — 99214 OFFICE O/P EST MOD 30 MIN: CPT | Mod: S$GLB,,, | Performed by: FAMILY MEDICINE

## 2020-08-07 PROCEDURE — 80061 LIPID PANEL: CPT

## 2020-08-07 PROCEDURE — 99214 PR OFFICE/OUTPT VISIT, EST, LEVL IV, 30-39 MIN: ICD-10-PCS | Mod: S$GLB,,, | Performed by: FAMILY MEDICINE

## 2020-08-07 PROCEDURE — 83036 HEMOGLOBIN GLYCOSYLATED A1C: CPT

## 2020-08-07 PROCEDURE — 99999 PR PBB SHADOW E&M-EST. PATIENT-LVL III: ICD-10-PCS | Mod: PBBFAC,,, | Performed by: FAMILY MEDICINE

## 2020-08-07 PROCEDURE — 80053 COMPREHEN METABOLIC PANEL: CPT

## 2020-08-07 PROCEDURE — 86703 HIV-1/HIV-2 1 RESULT ANTBDY: CPT

## 2020-08-07 RX ORDER — NAPROXEN 500 MG/1
500 TABLET ORAL 2 TIMES DAILY
Qty: 20 TABLET | Refills: 0 | Status: SHIPPED | OUTPATIENT
Start: 2020-08-07 | End: 2020-08-17

## 2020-08-07 RX ORDER — ATORVASTATIN CALCIUM 40 MG/1
40 TABLET, FILM COATED ORAL NIGHTLY
Qty: 90 TABLET | Refills: 3 | Status: SHIPPED | OUTPATIENT
Start: 2020-08-07 | End: 2021-05-13 | Stop reason: SDUPTHER
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
## 2020-08-07 NOTE — ASSESSMENT & PLAN NOTE
Very uncontrolled at the last check but for current history it seems that her control is much better.  Getting hemoglobin A1c as well as other labs are referring to optometry for update of diabetic care.

## 2020-08-08 LAB
ESTIMATED AVG GLUCOSE: 134 MG/DL (ref 68–131)
HBA1C MFR BLD HPLC: 6.3 % (ref 4–5.6)

## 2020-08-10 LAB — HIV 1+2 AB+HIV1 P24 AG SERPL QL IA: NEGATIVE

## 2020-08-18 ENCOUNTER — PATIENT OUTREACH (OUTPATIENT)
Dept: ADMINISTRATIVE | Facility: HOSPITAL | Age: 60
End: 2020-08-18

## 2020-08-21 ENCOUNTER — TELEPHONE (OUTPATIENT)
Dept: ENDOSCOPY | Facility: HOSPITAL | Age: 60
End: 2020-08-21

## 2020-08-25 ENCOUNTER — OFFICE VISIT (OUTPATIENT)
Dept: OPHTHALMOLOGY | Facility: CLINIC | Age: 60
End: 2020-08-25
Payer: COMMERCIAL

## 2020-08-25 ENCOUNTER — PATIENT OUTREACH (OUTPATIENT)
Dept: ADMINISTRATIVE | Facility: OTHER | Age: 60
End: 2020-08-25

## 2020-08-25 DIAGNOSIS — E11.36 DIABETIC CATARACT OF BOTH EYES: ICD-10-CM

## 2020-08-25 DIAGNOSIS — Z79.4 TYPE 2 DIABETES MELLITUS WITH HYPEROSMOLARITY WITHOUT COMA, WITH LONG-TERM CURRENT USE OF INSULIN: ICD-10-CM

## 2020-08-25 DIAGNOSIS — H25.013 CORTICAL AGE-RELATED CATARACT OF BOTH EYES: ICD-10-CM

## 2020-08-25 DIAGNOSIS — E11.9 TYPE 2 DIABETES MELLITUS WITHOUT RETINOPATHY: Primary | ICD-10-CM

## 2020-08-25 DIAGNOSIS — H52.203 MYOPIA WITH ASTIGMATISM AND PRESBYOPIA, BILATERAL: ICD-10-CM

## 2020-08-25 DIAGNOSIS — E11.00 TYPE 2 DIABETES MELLITUS WITH HYPEROSMOLARITY WITHOUT COMA, WITH LONG-TERM CURRENT USE OF INSULIN: ICD-10-CM

## 2020-08-25 DIAGNOSIS — H25.13 NUCLEAR SCLEROSIS, BILATERAL: ICD-10-CM

## 2020-08-25 DIAGNOSIS — H40.013 OPEN ANGLE WITH BORDERLINE FINDINGS OF BOTH EYES: ICD-10-CM

## 2020-08-25 DIAGNOSIS — H52.4 MYOPIA WITH ASTIGMATISM AND PRESBYOPIA, BILATERAL: ICD-10-CM

## 2020-08-25 DIAGNOSIS — H52.13 MYOPIA WITH ASTIGMATISM AND PRESBYOPIA, BILATERAL: ICD-10-CM

## 2020-08-25 PROCEDURE — 92004 COMPRE OPH EXAM NEW PT 1/>: CPT | Mod: S$GLB,,, | Performed by: OPTOMETRIST

## 2020-08-25 PROCEDURE — 92015 PR REFRACTION: ICD-10-PCS | Mod: S$GLB,,, | Performed by: OPTOMETRIST

## 2020-08-25 PROCEDURE — 99999 PR PBB SHADOW E&M-EST. PATIENT-LVL III: CPT | Mod: PBBFAC,,, | Performed by: OPTOMETRIST

## 2020-08-25 PROCEDURE — 92015 DETERMINE REFRACTIVE STATE: CPT | Mod: S$GLB,,, | Performed by: OPTOMETRIST

## 2020-08-25 PROCEDURE — 99999 PR PBB SHADOW E&M-EST. PATIENT-LVL III: ICD-10-PCS | Mod: PBBFAC,,, | Performed by: OPTOMETRIST

## 2020-08-25 PROCEDURE — 92004 PR EYE EXAM, NEW PATIENT,COMPREHESV: ICD-10-PCS | Mod: S$GLB,,, | Performed by: OPTOMETRIST

## 2020-08-25 NOTE — PROGRESS NOTES
Health Maintenance Due   Topic Date Due    TETANUS VACCINE  05/27/1976    Colorectal Cancer Screening  05/27/2008    Cervical Cancer Screening  06/05/2020     Updates were requested from care everywhere.  Chart was reviewed for overdue Proactive [facility] Encounters ([facility]) topics (CRS, Breast Cancer Screening, Eye exam)  Health Maintenance has been updated.  LINKS immunization registry triggered.  Immunizations were not reconciled. Patient not found in LINKS.

## 2020-08-25 NOTE — PROGRESS NOTES
HPI     Diabetic Eye Exam     Comments: Yearly              Comments     NP to DNL  Patient here today for yearly DM eye exam  Diabetic eye exam  Diagnosed with diabetes 7 years ago  Recent vision fluctuations No  Lab Results       Component                Value               Date                       HGBA1C                   6.3 (H)             08/07/2020            HPI    Any vision changes since last exam: No   Eye pain: No  Other ocular symptoms: No    Do you wear currently wear glasses or contacts? None    Interested in contacts today? No    Do you plan on getting new glasses today? If needed                Last edited by Mary Barrientos, PCT on 8/25/2020  3:53 PM. (History)              Assessment /Plan     For exam results, see Encounter Report.    Type 2 diabetes mellitus without retinopathy  No diabetic retinopathy in either eye  Continue close care with PCP   Monitor 12 months    Nuclear sclerosis, bilateral  Cortical age-related cataract of both eyes  Diabetic cataract of both eyes  Surgery is not indicated at this time.   Monitor 12 months.    Open angle with borderline findings of both eyes  Suspect based on ONH cupping/asymmetry  Borderline high IOP OS  No known fam hx  RTC for testing    Myopia with astigmatism and presbyopia, bilateral  Eyeglass Final Rx     Eyeglass Final Rx       Sphere Cylinder Axis Add    Right -0.50 +0.50 015 +2.25    Left -0.25 +0.25 180 +2.25    Expiration Date: 8/26/2021              Spec Rx is optional, ok to continue with OTC readers    RTC 1-6 wks for IOP check, 24-2VF, gOCT and pach  Discussed above and answered questions.

## 2020-08-25 NOTE — LETTER
August 26, 2020      Wayne Chew,   [address] [address] [address]           [facility]  [address] [facility] [address]  Phone: [phone]  Fax: [phone]          Patient: Pricilla Bower   MR Number: [id number]   YOB: 1960   Date of Visit: 8/25/2020       Dear Dr. Wayne Chew:    Thank you for referring Pricilla Bower to me for evaluation. Attached you will find relevant portions of my assessment and plan of care.    If you have questions, please do not hesitate to call me. I look forward to following Pricilla Bower along with you.    Sincerely,    Maurisio Brewster, OD    Enclosure  CC:  No Recipients    If you would like to receive this communication electronically, please contact [email or web address] or [phone] to request more information on [facility] access.    For providers and/or their staff who would like to refer a patient to [facility], please contact us through our one-stop-shop provider referral line, [facility], at [phone].    If you feel you have received this communication in error or would no longer like to receive these types of communications, please e-mail [email or web address]

## 2020-09-16 ENCOUNTER — PATIENT OUTREACH (OUTPATIENT)
Dept: ADMINISTRATIVE | Facility: HOSPITAL | Age: 60
End: 2020-09-16

## 2020-10-06 ENCOUNTER — PATIENT MESSAGE (OUTPATIENT)
Dept: ADMINISTRATIVE | Facility: HOSPITAL | Age: 60
End: 2020-10-06

## 2020-10-06 ENCOUNTER — HOSPITAL ENCOUNTER (OUTPATIENT)
Dept: RADIOLOGY | Facility: HOSPITAL | Age: 60
Discharge: HOME | End: 2020-10-06
Attending: FAMILY MEDICINE
Payer: COMMERCIAL

## 2020-10-06 VITALS — BODY MASS INDEX: 29.41 KG/M2 | HEIGHT: 66 IN | WEIGHT: 183 LBS

## 2020-10-06 DIAGNOSIS — Z12.31 ENCOUNTER FOR SCREENING MAMMOGRAM FOR BREAST CANCER: ICD-10-CM

## 2020-10-06 PROCEDURE — 77067 SCR MAMMO BI INCL CAD: CPT | Mod: 26,,, | Performed by: RADIOLOGY

## 2020-10-06 PROCEDURE — 77067 MAMMO DIGITAL SCREENING BILAT WITH TOMO: ICD-10-PCS | Mod: 26,,, | Performed by: RADIOLOGY

## 2020-10-06 PROCEDURE — 77063 BREAST TOMOSYNTHESIS BI: CPT | Mod: 26,,, | Performed by: RADIOLOGY

## 2020-10-06 PROCEDURE — 77063 MAMMO DIGITAL SCREENING BILAT WITH TOMO: ICD-10-PCS | Mod: 26,,, | Performed by: RADIOLOGY

## 2020-10-06 PROCEDURE — 77067 SCR MAMMO BI INCL CAD: CPT | Mod: TC

## 2020-10-08 DIAGNOSIS — Z12.11 COLON CANCER SCREENING: Primary | ICD-10-CM

## 2020-11-16 ENCOUNTER — PATIENT OUTREACH (OUTPATIENT)
Dept: ADMINISTRATIVE | Facility: OTHER | Age: 60
End: 2020-11-16

## 2020-11-16 DIAGNOSIS — E11.9 TYPE 2 DIABETES MELLITUS WITHOUT COMPLICATION, UNSPECIFIED WHETHER LONG TERM INSULIN USE: Primary | ICD-10-CM

## 2020-11-16 NOTE — PROGRESS NOTES
Health Maintenance Due   Topic Date Due    TETANUS VACCINE  05/27/1976    Colorectal Cancer Screening  05/27/2008    Cervical Cancer Screening  06/05/2020    Influenza Vaccine (1) 08/01/2020    Hemoglobin A1c  11/07/2020     Updates were requested from care everywhere.  Chart was reviewed for overdue Proactive [facility] Encounters ([facility]) topics (CRS, Breast Cancer Screening, Eye exam)  Health Maintenance has been updated.  LINKS immunization registry triggered.  LINKS not responding.

## 2021-03-12 DIAGNOSIS — I10 ESSENTIAL HYPERTENSION: ICD-10-CM

## 2021-03-12 DIAGNOSIS — E11.00 TYPE 2 DIABETES MELLITUS WITH HYPEROSMOLARITY WITHOUT COMA, WITHOUT LONG-TERM CURRENT USE OF INSULIN: ICD-10-CM

## 2021-03-12 RX ORDER — AMLODIPINE BESYLATE 5 MG/1
5 TABLET ORAL DAILY
Qty: 30 TABLET | Refills: 5 | Status: SHIPPED | OUTPATIENT
Start: 2021-03-12 | End: 2021-05-13 | Stop reason: SDUPTHER

## 2021-03-12 RX ORDER — METFORMIN HYDROCHLORIDE 1000 MG/1
1000 TABLET ORAL 2 TIMES DAILY WITH MEALS
Qty: 180 TABLET | Refills: 3 | Status: SHIPPED | OUTPATIENT
Start: 2021-03-12 | End: 2021-05-13 | Stop reason: SDUPTHER

## 2021-04-09 ENCOUNTER — PATIENT OUTREACH (OUTPATIENT)
Dept: ADMINISTRATIVE | Facility: HOSPITAL | Age: 61
End: 2021-04-09

## 2021-05-07 ENCOUNTER — PATIENT OUTREACH (OUTPATIENT)
Dept: ADMINISTRATIVE | Facility: HOSPITAL | Age: 61
End: 2021-05-07

## 2021-05-13 ENCOUNTER — LAB VISIT (OUTPATIENT)
Dept: LAB | Facility: HOSPITAL | Age: 61
End: 2021-05-13
Attending: FAMILY MEDICINE
Payer: COMMERCIAL

## 2021-05-13 ENCOUNTER — OFFICE VISIT (OUTPATIENT)
Dept: INTERNAL MEDICINE | Facility: CLINIC | Age: 61
End: 2021-05-13
Payer: COMMERCIAL

## 2021-05-13 VITALS
SYSTOLIC BLOOD PRESSURE: 138 MMHG | OXYGEN SATURATION: 100 % | HEIGHT: 67 IN | HEART RATE: 69 BPM | BODY MASS INDEX: 29.27 KG/M2 | DIASTOLIC BLOOD PRESSURE: 70 MMHG | TEMPERATURE: 98 F | WEIGHT: 186.5 LBS

## 2021-05-13 DIAGNOSIS — E11.00 TYPE 2 DIABETES MELLITUS WITH HYPEROSMOLARITY WITHOUT COMA, WITHOUT LONG-TERM CURRENT USE OF INSULIN: ICD-10-CM

## 2021-05-13 DIAGNOSIS — E11.00 TYPE 2 DIABETES MELLITUS WITH HYPEROSMOLARITY WITHOUT COMA, WITH LONG-TERM CURRENT USE OF INSULIN: ICD-10-CM

## 2021-05-13 DIAGNOSIS — I10 ESSENTIAL HYPERTENSION: ICD-10-CM

## 2021-05-13 DIAGNOSIS — E78.49 OTHER HYPERLIPIDEMIA: ICD-10-CM

## 2021-05-13 DIAGNOSIS — E11.9 CONTROLLED TYPE 2 DIABETES MELLITUS WITHOUT COMPLICATION, WITHOUT LONG-TERM CURRENT USE OF INSULIN: Primary | ICD-10-CM

## 2021-05-13 DIAGNOSIS — E78.2 MIXED HYPERLIPIDEMIA: ICD-10-CM

## 2021-05-13 DIAGNOSIS — Z79.4 TYPE 2 DIABETES MELLITUS WITH HYPEROSMOLARITY WITHOUT COMA, WITH LONG-TERM CURRENT USE OF INSULIN: ICD-10-CM

## 2021-05-13 DIAGNOSIS — E78.5 HYPERLIPIDEMIA, UNSPECIFIED HYPERLIPIDEMIA TYPE: ICD-10-CM

## 2021-05-13 DIAGNOSIS — E11.9 CONTROLLED TYPE 2 DIABETES MELLITUS WITHOUT COMPLICATION, WITHOUT LONG-TERM CURRENT USE OF INSULIN: ICD-10-CM

## 2021-05-13 LAB
BACTERIA #/AREA URNS AUTO: ABNORMAL /HPF
BASOPHILS # BLD AUTO: 0.03 K/UL (ref 0–0.2)
BASOPHILS NFR BLD: 0.5 % (ref 0–1.9)
BILIRUB UR QL STRIP: NEGATIVE
CAOX CRY UR QL COMP ASSIST: ABNORMAL
CLARITY UR REFRACT.AUTO: ABNORMAL
COLOR UR AUTO: ABNORMAL
DIFFERENTIAL METHOD: ABNORMAL
EOSINOPHIL # BLD AUTO: 0.1 K/UL (ref 0–0.5)
EOSINOPHIL NFR BLD: 2.2 % (ref 0–8)
ERYTHROCYTE [DISTWIDTH] IN BLOOD BY AUTOMATED COUNT: 14.4 % (ref 11.5–14.5)
GLUCOSE UR QL STRIP: NEGATIVE
HCT VFR BLD AUTO: 39.5 % (ref 37–48.5)
HGB BLD-MCNC: 12.6 G/DL (ref 12–16)
HGB UR QL STRIP: NEGATIVE
IMM GRANULOCYTES # BLD AUTO: 0.01 K/UL (ref 0–0.04)
IMM GRANULOCYTES NFR BLD AUTO: 0.2 % (ref 0–0.5)
KETONES UR QL STRIP: NEGATIVE
LEUKOCYTE ESTERASE UR QL STRIP: ABNORMAL
LYMPHOCYTES # BLD AUTO: 1.4 K/UL (ref 1–4.8)
LYMPHOCYTES NFR BLD: 22.7 % (ref 18–48)
MCH RBC QN AUTO: 29 PG (ref 27–31)
MCHC RBC AUTO-ENTMCNC: 31.9 G/DL (ref 32–36)
MCV RBC AUTO: 91 FL (ref 82–98)
MICROSCOPIC COMMENT: ABNORMAL
MONOCYTES # BLD AUTO: 0.3 K/UL (ref 0.3–1)
MONOCYTES NFR BLD: 4.5 % (ref 4–15)
NEUTROPHILS # BLD AUTO: 4.4 K/UL (ref 1.8–7.7)
NEUTROPHILS NFR BLD: 69.9 % (ref 38–73)
NITRITE UR QL STRIP: NEGATIVE
NRBC BLD-RTO: 0 /100 WBC
PH UR STRIP: 5 [PH] (ref 5–8)
PLATELET # BLD AUTO: 321 K/UL (ref 150–450)
PMV BLD AUTO: 11 FL (ref 9.2–12.9)
PROT UR QL STRIP: NEGATIVE
RBC # BLD AUTO: 4.35 M/UL (ref 4–5.4)
RBC #/AREA URNS AUTO: 1 /HPF (ref 0–4)
SP GR UR STRIP: 1.02 (ref 1–1.03)
SQUAMOUS #/AREA URNS AUTO: 7 /HPF
URN SPEC COLLECT METH UR: ABNORMAL
WBC # BLD AUTO: 6.29 K/UL (ref 3.9–12.7)
WBC #/AREA URNS AUTO: 3 /HPF (ref 0–5)

## 2021-05-13 PROCEDURE — 83036 HEMOGLOBIN GLYCOSYLATED A1C: CPT | Performed by: FAMILY MEDICINE

## 2021-05-13 PROCEDURE — 85025 COMPLETE CBC W/AUTO DIFF WBC: CPT | Performed by: FAMILY MEDICINE

## 2021-05-13 PROCEDURE — 99999 PR PBB SHADOW E&M-EST. PATIENT-LVL III: ICD-10-PCS | Mod: PBBFAC,,, | Performed by: FAMILY MEDICINE

## 2021-05-13 PROCEDURE — 99999 PR PBB SHADOW E&M-EST. PATIENT-LVL III: CPT | Mod: PBBFAC,,, | Performed by: FAMILY MEDICINE

## 2021-05-13 PROCEDURE — 82043 UR ALBUMIN QUANTITATIVE: CPT | Performed by: FAMILY MEDICINE

## 2021-05-13 PROCEDURE — 81001 URINALYSIS AUTO W/SCOPE: CPT | Performed by: FAMILY MEDICINE

## 2021-05-13 PROCEDURE — 99386 PR PREVENTIVE VISIT,NEW,40-64: ICD-10-PCS | Mod: S$GLB,,, | Performed by: FAMILY MEDICINE

## 2021-05-13 PROCEDURE — 82570 ASSAY OF URINE CREATININE: CPT | Performed by: FAMILY MEDICINE

## 2021-05-13 PROCEDURE — 36415 COLL VENOUS BLD VENIPUNCTURE: CPT | Mod: PO | Performed by: FAMILY MEDICINE

## 2021-05-13 PROCEDURE — 80053 COMPREHEN METABOLIC PANEL: CPT | Performed by: FAMILY MEDICINE

## 2021-05-13 PROCEDURE — 99386 PREV VISIT NEW AGE 40-64: CPT | Mod: S$GLB,,, | Performed by: FAMILY MEDICINE

## 2021-05-13 RX ORDER — ATORVASTATIN CALCIUM 40 MG/1
40 TABLET, FILM COATED ORAL NIGHTLY
Qty: 90 TABLET | Refills: 5 | Status: SHIPPED | OUTPATIENT
Start: 2021-05-13 | End: 2022-08-25

## 2021-05-13 RX ORDER — METFORMIN HYDROCHLORIDE 1000 MG/1
1000 TABLET ORAL 2 TIMES DAILY WITH MEALS
Qty: 180 TABLET | Refills: 5 | Status: SHIPPED | OUTPATIENT
Start: 2021-05-13 | End: 2022-10-03

## 2021-05-13 RX ORDER — NAPROXEN SODIUM 220 MG/1
81 TABLET, FILM COATED ORAL DAILY
Qty: 100 TABLET | Refills: 5 | Status: SHIPPED | OUTPATIENT
Start: 2021-05-13 | End: 2024-03-15

## 2021-05-13 RX ORDER — AMLODIPINE BESYLATE 5 MG/1
5 TABLET ORAL DAILY
Qty: 90 TABLET | Refills: 5 | Status: SHIPPED | OUTPATIENT
Start: 2021-05-13 | End: 2022-07-22

## 2021-05-14 LAB
ALBUMIN SERPL BCP-MCNC: 4.1 G/DL (ref 3.5–5.2)
ALBUMIN/CREAT UR: 12.8 UG/MG (ref 0–30)
ALP SERPL-CCNC: 62 U/L (ref 55–135)
ALT SERPL W/O P-5'-P-CCNC: 32 U/L (ref 10–44)
ANION GAP SERPL CALC-SCNC: 9 MMOL/L (ref 8–16)
AST SERPL-CCNC: 29 U/L (ref 10–40)
BILIRUB SERPL-MCNC: 0.4 MG/DL (ref 0.1–1)
BUN SERPL-MCNC: 8 MG/DL (ref 8–23)
CALCIUM SERPL-MCNC: 9.8 MG/DL (ref 8.7–10.5)
CHLORIDE SERPL-SCNC: 103 MMOL/L (ref 95–110)
CO2 SERPL-SCNC: 28 MMOL/L (ref 23–29)
CREAT SERPL-MCNC: 0.9 MG/DL (ref 0.5–1.4)
CREAT UR-MCNC: 211 MG/DL (ref 15–325)
EST. GFR  (AFRICAN AMERICAN): >60 ML/MIN/1.73 M^2
EST. GFR  (NON AFRICAN AMERICAN): >60 ML/MIN/1.73 M^2
ESTIMATED AVG GLUCOSE: 151 MG/DL (ref 68–131)
GLUCOSE SERPL-MCNC: 134 MG/DL (ref 70–110)
HBA1C MFR BLD: 6.9 % (ref 4–5.6)
MICROALBUMIN UR DL<=1MG/L-MCNC: 27 UG/ML
POTASSIUM SERPL-SCNC: 4 MMOL/L (ref 3.5–5.1)
PROT SERPL-MCNC: 7.8 G/DL (ref 6–8.4)
SODIUM SERPL-SCNC: 140 MMOL/L (ref 136–145)

## 2021-10-06 DIAGNOSIS — E11.9 TYPE 2 DIABETES MELLITUS WITHOUT COMPLICATION: ICD-10-CM

## 2021-12-28 ENCOUNTER — LAB VISIT (OUTPATIENT)
Dept: LAB | Facility: HOSPITAL | Age: 61
End: 2021-12-28
Attending: NURSE PRACTITIONER
Payer: OTHER GOVERNMENT

## 2021-12-28 ENCOUNTER — OFFICE VISIT (OUTPATIENT)
Dept: INTERNAL MEDICINE | Facility: CLINIC | Age: 61
End: 2021-12-28
Payer: OTHER GOVERNMENT

## 2021-12-28 VITALS
SYSTOLIC BLOOD PRESSURE: 124 MMHG | OXYGEN SATURATION: 99 % | RESPIRATION RATE: 18 BRPM | BODY MASS INDEX: 29.47 KG/M2 | WEIGHT: 187.75 LBS | HEART RATE: 78 BPM | DIASTOLIC BLOOD PRESSURE: 66 MMHG | HEIGHT: 67 IN | TEMPERATURE: 99 F

## 2021-12-28 DIAGNOSIS — Z79.4 TYPE 2 DIABETES MELLITUS WITH HYPEROSMOLARITY WITHOUT COMA, WITH LONG-TERM CURRENT USE OF INSULIN: ICD-10-CM

## 2021-12-28 DIAGNOSIS — Z12.11 COLON CANCER SCREENING: ICD-10-CM

## 2021-12-28 DIAGNOSIS — Z79.4 TYPE 2 DIABETES MELLITUS WITH HYPEROSMOLARITY WITHOUT COMA, WITH LONG-TERM CURRENT USE OF INSULIN: Primary | ICD-10-CM

## 2021-12-28 DIAGNOSIS — R51.9 ACUTE NONINTRACTABLE HEADACHE, UNSPECIFIED HEADACHE TYPE: ICD-10-CM

## 2021-12-28 DIAGNOSIS — E11.00 TYPE 2 DIABETES MELLITUS WITH HYPEROSMOLARITY WITHOUT COMA, WITH LONG-TERM CURRENT USE OF INSULIN: ICD-10-CM

## 2021-12-28 DIAGNOSIS — E11.00 TYPE 2 DIABETES MELLITUS WITH HYPEROSMOLARITY WITHOUT COMA, WITH LONG-TERM CURRENT USE OF INSULIN: Primary | ICD-10-CM

## 2021-12-28 LAB
ALBUMIN SERPL BCP-MCNC: 4.2 G/DL (ref 3.5–5.2)
ALP SERPL-CCNC: 67 U/L (ref 55–135)
ALT SERPL W/O P-5'-P-CCNC: 31 U/L (ref 10–44)
ANION GAP SERPL CALC-SCNC: 8 MMOL/L (ref 8–16)
AST SERPL-CCNC: 23 U/L (ref 10–40)
BILIRUB SERPL-MCNC: 0.6 MG/DL (ref 0.1–1)
BUN SERPL-MCNC: 11 MG/DL (ref 8–23)
CALCIUM SERPL-MCNC: 9.8 MG/DL (ref 8.7–10.5)
CHLORIDE SERPL-SCNC: 104 MMOL/L (ref 95–110)
CHOLEST SERPL-MCNC: 164 MG/DL (ref 120–199)
CHOLEST/HDLC SERPL: 4 {RATIO} (ref 2–5)
CO2 SERPL-SCNC: 29 MMOL/L (ref 23–29)
CREAT SERPL-MCNC: 0.8 MG/DL (ref 0.5–1.4)
CTP QC/QA: YES
EST. GFR  (AFRICAN AMERICAN): >60 ML/MIN/1.73 M^2
EST. GFR  (NON AFRICAN AMERICAN): >60 ML/MIN/1.73 M^2
ESTIMATED AVG GLUCOSE: 143 MG/DL (ref 68–131)
GLUCOSE SERPL-MCNC: 128 MG/DL (ref 70–110)
GLUCOSE SERPL-MCNC: 149 MG/DL (ref 70–110)
HBA1C MFR BLD: 6.6 % (ref 4–5.6)
HDLC SERPL-MCNC: 41 MG/DL (ref 40–75)
HDLC SERPL: 25 % (ref 20–50)
LDLC SERPL CALC-MCNC: 93.2 MG/DL (ref 63–159)
NONHDLC SERPL-MCNC: 123 MG/DL
POTASSIUM SERPL-SCNC: 4.5 MMOL/L (ref 3.5–5.1)
PROT SERPL-MCNC: 7.5 G/DL (ref 6–8.4)
SARS-COV-2 RDRP RESP QL NAA+PROBE: NEGATIVE
SODIUM SERPL-SCNC: 141 MMOL/L (ref 136–145)
TRIGL SERPL-MCNC: 149 MG/DL (ref 30–150)

## 2021-12-28 PROCEDURE — 83036 HEMOGLOBIN GLYCOSYLATED A1C: CPT | Performed by: NURSE PRACTITIONER

## 2021-12-28 PROCEDURE — U0002 COVID-19 LAB TEST NON-CDC: HCPCS | Mod: PBBFAC,PN | Performed by: NURSE PRACTITIONER

## 2021-12-28 PROCEDURE — 80053 COMPREHEN METABOLIC PANEL: CPT | Performed by: NURSE PRACTITIONER

## 2021-12-28 PROCEDURE — 80061 LIPID PANEL: CPT | Performed by: NURSE PRACTITIONER

## 2021-12-28 PROCEDURE — 99999 PR PBB SHADOW E&M-EST. PATIENT-LVL IV: ICD-10-PCS | Mod: PBBFAC,,, | Performed by: NURSE PRACTITIONER

## 2021-12-28 PROCEDURE — 99213 PR OFFICE/OUTPT VISIT, EST, LEVL III, 20-29 MIN: ICD-10-PCS | Mod: S$PBB,,, | Performed by: NURSE PRACTITIONER

## 2021-12-28 PROCEDURE — 82962 GLUCOSE BLOOD TEST: CPT | Mod: PBBFAC,PN | Performed by: NURSE PRACTITIONER

## 2021-12-28 PROCEDURE — 36415 COLL VENOUS BLD VENIPUNCTURE: CPT | Mod: PO | Performed by: NURSE PRACTITIONER

## 2021-12-28 PROCEDURE — 99999 PR PBB SHADOW E&M-EST. PATIENT-LVL IV: CPT | Mod: PBBFAC,,, | Performed by: NURSE PRACTITIONER

## 2021-12-28 PROCEDURE — 99214 OFFICE O/P EST MOD 30 MIN: CPT | Mod: PBBFAC,PN | Performed by: NURSE PRACTITIONER

## 2021-12-28 PROCEDURE — 99213 OFFICE O/P EST LOW 20 MIN: CPT | Mod: S$PBB,,, | Performed by: NURSE PRACTITIONER

## 2022-01-04 ENCOUNTER — TELEPHONE (OUTPATIENT)
Dept: INTERNAL MEDICINE | Facility: CLINIC | Age: 62
End: 2022-01-04
Payer: OTHER GOVERNMENT

## 2022-01-04 NOTE — TELEPHONE ENCOUNTER
----- Message from Aleisha Chi sent at 1/3/2022  4:52 PM CST -----  Patient returning  call to nurse. Call back number  [phone] (home)

## 2022-05-16 ENCOUNTER — OFFICE VISIT (OUTPATIENT)
Dept: INTERNAL MEDICINE | Facility: CLINIC | Age: 62
End: 2022-05-16
Payer: COMMERCIAL

## 2022-05-16 VITALS
WEIGHT: 184.06 LBS | TEMPERATURE: 98 F | SYSTOLIC BLOOD PRESSURE: 122 MMHG | OXYGEN SATURATION: 97 % | RESPIRATION RATE: 15 BRPM | BODY MASS INDEX: 28.83 KG/M2 | HEART RATE: 78 BPM | DIASTOLIC BLOOD PRESSURE: 74 MMHG

## 2022-05-16 DIAGNOSIS — M25.421 SWELLING OF JOINT OF UPPER ARM, RIGHT: ICD-10-CM

## 2022-05-16 DIAGNOSIS — I10 PRIMARY HYPERTENSION: ICD-10-CM

## 2022-05-16 DIAGNOSIS — Z79.4 TYPE 2 DIABETES MELLITUS WITH HYPEROSMOLARITY WITHOUT COMA, WITH LONG-TERM CURRENT USE OF INSULIN: ICD-10-CM

## 2022-05-16 DIAGNOSIS — E11.00 TYPE 2 DIABETES MELLITUS WITH HYPEROSMOLARITY WITHOUT COMA, WITH LONG-TERM CURRENT USE OF INSULIN: ICD-10-CM

## 2022-05-16 DIAGNOSIS — Z12.31 BREAST CANCER SCREENING BY MAMMOGRAM: ICD-10-CM

## 2022-05-16 DIAGNOSIS — Z00.00 ROUTINE MEDICAL EXAM: Primary | ICD-10-CM

## 2022-05-16 DIAGNOSIS — E78.2 MIXED HYPERLIPIDEMIA: ICD-10-CM

## 2022-05-16 PROCEDURE — 99214 PR OFFICE/OUTPT VISIT, EST, LEVL IV, 30-39 MIN: ICD-10-PCS | Mod: S$GLB,,, | Performed by: NURSE PRACTITIONER

## 2022-05-16 PROCEDURE — 99999 PR PBB SHADOW E&M-EST. PATIENT-LVL V: CPT | Mod: PBBFAC,,, | Performed by: NURSE PRACTITIONER

## 2022-05-16 PROCEDURE — 99999 PR PBB SHADOW E&M-EST. PATIENT-LVL V: ICD-10-PCS | Mod: PBBFAC,,, | Performed by: NURSE PRACTITIONER

## 2022-05-16 PROCEDURE — 99214 OFFICE O/P EST MOD 30 MIN: CPT | Mod: S$GLB,,, | Performed by: NURSE PRACTITIONER

## 2022-05-16 NOTE — Clinical Note
Please call with appointment.  She had to go to work.   6 month follow up GYN Mammo and eye exam, ultrasound -the [facility]

## 2022-05-16 NOTE — PROGRESS NOTES
Subjective:       Patient ID: Pricilla Bower is a 61 y.o. female.    Chief Complaint: Arm Pain (R arm pain x 2 mos )    Patient presents for routine exam and right arm pain.  Pain started about 2 months ago.  Went to [facility] urgent care about 2 weeks ago.  Had x-ray and was told of arthritis.  Was prescribed meloxicam with some improvement.     Review of Systems   Constitutional: Negative for activity change, appetite change, fatigue and fever.   HENT: Negative for nasal congestion, ear discharge, ear pain, mouth sores, nosebleeds, sore throat and tinnitus.    Eyes: Negative for discharge, redness and itching.   Respiratory: Negative for apnea, cough and shortness of breath.    Cardiovascular: Negative for chest pain and leg swelling.   Gastrointestinal: Negative for abdominal distention, abdominal pain, blood in stool and constipation.   Endocrine: Negative for polydipsia, polyphagia and polyuria.   Genitourinary: Negative for difficulty urinating, flank pain, frequency and hematuria.   Musculoskeletal: Positive for arthralgias. Negative for back pain, gait problem, neck pain and neck stiffness.   Integumentary:  Negative for rash and wound.   Allergic/Immunologic: Negative for environmental allergies, food allergies and immunocompromised state.   Neurological: Negative for dizziness, seizures, syncope, numbness and headaches.   Hematological: Negative for adenopathy. Does not bruise/bleed easily.   Psychiatric/Behavioral: Negative for agitation, confusion, hallucinations, self-injury and suicidal ideas.         Objective:      Physical Exam  Vitals reviewed.   Constitutional:       Appearance: Normal appearance.   HENT:      Head: Normocephalic.      Right Ear: Tympanic membrane and ear canal normal.      Left Ear: Tympanic membrane and ear canal normal.   Eyes:      Extraocular Movements: Extraocular movements intact.   Cardiovascular:      Rate and Rhythm: Normal rate and regular rhythm.      Pulses:            Dorsalis pedis pulses are 2+ on the right side and 2+ on the left side.   Pulmonary:      Effort: Pulmonary effort is normal.   Abdominal:      General: Bowel sounds are normal. There is no distension.      Tenderness: There is no abdominal tenderness.   Musculoskeletal:      Right upper arm: Swelling (soft tissue ) present.        Arms:    Feet:      Right foot:      Protective Sensation: 6 sites tested. 6 sites sensed.      Skin integrity: Callus present. No dry skin.      Left foot:      Protective Sensation: 6 sites tested. 6 sites sensed.      Skin integrity: Callus present.   Skin:     General: Skin is warm and dry.   Neurological:      General: No focal deficit present.      Mental Status: She is alert and oriented to person, place, and time.   Psychiatric:         Mood and Affect: Mood normal.         Behavior: Behavior normal. Behavior is cooperative.         Assessment:       Problem List Items Addressed This Visit     Mixed hyperlipidemia    Diabetes mellitus, type 2    Relevant Orders    Hemoglobin A1C    Ambulatory referral/consult to Ophthalmology    Hypertension      Other Visit Diagnoses     Routine medical exam    -  Primary    Relevant Orders    Ambulatory referral/consult to Ophthalmology    Ambulatory referral/consult to Gynecology    Swelling of joint of upper arm, right        Relevant Orders    US Soft Tissue Upper Extremity, Right    Breast cancer screening by mammogram        Relevant Orders    Mammo Digital Screening Bilat w/ Cricket          Plan:         Routine medical exam  -     Ambulatory referral/consult to Ophthalmology; Future; Expected date: 05/23/2022  -     Ambulatory referral/consult to Gynecology; Future; Expected date: 05/23/2022    Primary hypertension    Mixed hyperlipidemia    Type 2 diabetes mellitus with hyperosmolarity without coma, with long-term current use of insulin  -     Hemoglobin A1C; Future; Expected date: 05/16/2022  -     Ambulatory referral/consult to  Ophthalmology; Future; Expected date: 05/23/2022    Swelling of joint of upper arm, right  -     US Soft Tissue Upper Extremity, Right; Future; Expected date: 05/16/2022    Breast cancer screening by mammogram  -     Mammo Digital Screening Dhaval w/ Cricket; Future; Expected date: 05/16/2022

## 2022-06-01 ENCOUNTER — HOSPITAL ENCOUNTER (OUTPATIENT)
Dept: RADIOLOGY | Facility: HOSPITAL | Age: 62
Discharge: HOME | End: 2022-06-01
Attending: NURSE PRACTITIONER
Payer: COMMERCIAL

## 2022-06-01 ENCOUNTER — OFFICE VISIT (OUTPATIENT)
Dept: OBSTETRICS AND GYNECOLOGY | Facility: CLINIC | Age: 62
End: 2022-06-01
Payer: COMMERCIAL

## 2022-06-01 VITALS
WEIGHT: 184.31 LBS | DIASTOLIC BLOOD PRESSURE: 76 MMHG | BODY MASS INDEX: 28.87 KG/M2 | SYSTOLIC BLOOD PRESSURE: 154 MMHG

## 2022-06-01 DIAGNOSIS — Z12.4 ENCOUNTER FOR SCREENING FOR CERVICAL CANCER: ICD-10-CM

## 2022-06-01 DIAGNOSIS — M25.421 SWELLING OF JOINT OF UPPER ARM, RIGHT: ICD-10-CM

## 2022-06-01 DIAGNOSIS — Z01.419 WELL WOMAN EXAM WITH ROUTINE GYNECOLOGICAL EXAM: Primary | ICD-10-CM

## 2022-06-01 PROCEDURE — 87624 HPV HI-RISK TYP POOLED RSLT: CPT | Performed by: NURSE PRACTITIONER

## 2022-06-01 PROCEDURE — 76882 US SOFT TISSUE, UPPER EXTREMITY, RIGHT: ICD-10-PCS | Mod: 26,RT,, | Performed by: RADIOLOGY

## 2022-06-01 PROCEDURE — 99386 PR PREVENTIVE VISIT,NEW,40-64: ICD-10-PCS | Mod: S$GLB,,, | Performed by: NURSE PRACTITIONER

## 2022-06-01 PROCEDURE — 88175 CYTOPATH C/V AUTO FLUID REDO: CPT | Performed by: NURSE PRACTITIONER

## 2022-06-01 PROCEDURE — 99386 PREV VISIT NEW AGE 40-64: CPT | Mod: S$GLB,,, | Performed by: NURSE PRACTITIONER

## 2022-06-01 PROCEDURE — 99999 PR PBB SHADOW E&M-EST. PATIENT-LVL III: ICD-10-PCS | Mod: PBBFAC,,, | Performed by: NURSE PRACTITIONER

## 2022-06-01 PROCEDURE — 76882 US LMTD JT/FCL EVL NVASC XTR: CPT | Mod: 26,RT,, | Performed by: RADIOLOGY

## 2022-06-01 PROCEDURE — 99999 PR PBB SHADOW E&M-EST. PATIENT-LVL III: CPT | Mod: PBBFAC,,, | Performed by: NURSE PRACTITIONER

## 2022-06-01 PROCEDURE — 76882 US LMTD JT/FCL EVL NVASC XTR: CPT | Mod: TC,RT

## 2022-06-01 NOTE — PROGRESS NOTES
Subjective:       Patient ID: Pricilla Bower is a 62 y.o. female.    Chief Complaint:  Well Woman    No LMP recorded. Patient is postmenopausal.  History of Present Illness  Annual Exam-Postmenopausal  Patient presents for annual exam. The patient has no complaints today. The patient is sexually active. GYN screening history: last pap: approximate date 17 and was abnormal: unsatisfactory and last mammogram: approximate date 10/6/2020 and was normal. The patient is not taking hormone replacement therapy. Patient denies post-menopausal vaginal bleeding. The patient wears seatbelts: yes. The patient participates in regular exercise: yes. Has the patient ever been transfused or tattooed?: no. The patient reports that there is not domestic violence in her life.    OB History    Para Term  AB Living   3 2 2   1     SAB IAB Ectopic Multiple Live Births   1              # Outcome Date GA Lbr Michael/2nd Weight Sex Delivery Anes PTL Lv   3 SAB            2 Term            1 Term                Review of Systems  Review of Systems   Constitutional: Negative for appetite change, fatigue, fever and unexpected weight change.   Eyes: Negative for visual disturbance.   Cardiovascular: Negative for chest pain.   Gastrointestinal: Negative for abdominal pain, bloating, constipation, diarrhea, nausea and vomiting.   Genitourinary: Negative for bladder incontinence, dysmenorrhea, dyspareunia, dysuria, flank pain, frequency, genital sores, menorrhagia, menstrual problem, pelvic pain, urgency, vaginal bleeding, vaginal discharge, vaginal pain, postcoital bleeding, vaginal dryness and vaginal odor.   Integumentary:  Negative for rash, acne, mole/lesion, breast mass, nipple discharge, breast skin changes and breast tenderness.   Neurological: Negative for syncope and headaches.   Hematological: Negative for adenopathy. Does not bruise/bleed easily.   All other systems reviewed and are negative.  Breast: Positive for  breast self exam.Negative for asymmetry, lump, mass, nipple discharge, skin changes and tenderness           Objective:      Physical Exam:   Constitutional: She is oriented to person, place, and time. She appears well-developed and well-nourished.    HENT:   Head: Normocephalic and atraumatic.    Eyes: Pupils are equal, round, and reactive to light. Conjunctivae and EOM are normal.     Cardiovascular: Normal rate and regular rhythm.     Pulmonary/Chest: Effort normal. Right breast exhibits no inverted nipple, no mass, no nipple discharge, no skin change, no tenderness, no bleeding and no swelling. Left breast exhibits no inverted nipple, no mass, no nipple discharge, no skin change, no tenderness, no bleeding and no swelling. Breasts are symmetrical.        Abdominal: Soft. Hernia confirmed negative in the right inguinal area and confirmed negative in the left inguinal area.     Genitourinary:    Inguinal canal, vagina, uterus, right adnexa, left adnexa and rectum normal.      Pelvic exam was performed with patient supine.   The external female genitalia was normal.   Genitalia hair distrobution normal .   Labial bartholins normal.There is no rash, tenderness, lesion or injury on the right labia. There is no rash, tenderness, lesion or injury on the left labia. Cervix is normal. No erythema,  no vaginal discharge, bleeding, rectocele, cystocele or unspecified prolapse of vaginal walls in the vagina. Vaginal atrophy noted. Cervix exhibits no motion tenderness and no friability. Uterus is not tender.           Musculoskeletal: Normal range of motion and moves all extremeties.      Lymphadenopathy: No inguinal adenopathy noted on the right or left side.    Neurological: She is alert and oriented to person, place, and time.    Skin: Skin is warm and dry. No rash noted. No erythema.    Psychiatric: She has a normal mood and affect. Her behavior is normal. Judgment and thought content normal.            Assessment:      1. Well woman exam with routine gynecological exam    2. Encounter for screening for cervical cancer              Plan:   Pricilla was seen today for well woman.    Diagnoses and all orders for this visit:    Well woman exam with routine gynecological exam  -     Ambulatory referral/consult to Gynecology  -     Liquid-Based Pap Smear, Screening  -     HPV High Risk Genotypes, PCR    Encounter for screening for cervical cancer  -     Liquid-Based Pap Smear, Screening  -     HPV High Risk Genotypes, PCR      Follow up with me in 1 year for annual well woman exam.

## 2022-06-03 ENCOUNTER — OFFICE VISIT (OUTPATIENT)
Dept: INTERNAL MEDICINE | Facility: CLINIC | Age: 62
End: 2022-06-03
Payer: COMMERCIAL

## 2022-06-03 VITALS
RESPIRATION RATE: 18 BRPM | OXYGEN SATURATION: 98 % | DIASTOLIC BLOOD PRESSURE: 78 MMHG | WEIGHT: 185.31 LBS | HEART RATE: 73 BPM | TEMPERATURE: 98 F | SYSTOLIC BLOOD PRESSURE: 124 MMHG | BODY MASS INDEX: 29.09 KG/M2 | HEIGHT: 67 IN

## 2022-06-03 DIAGNOSIS — Z79.4 TYPE 2 DIABETES MELLITUS WITH HYPEROSMOLARITY WITHOUT COMA, WITH LONG-TERM CURRENT USE OF INSULIN: ICD-10-CM

## 2022-06-03 DIAGNOSIS — E78.2 MIXED HYPERLIPIDEMIA: ICD-10-CM

## 2022-06-03 DIAGNOSIS — M19.011 PRIMARY OSTEOARTHRITIS OF RIGHT SHOULDER: ICD-10-CM

## 2022-06-03 DIAGNOSIS — I10 PRIMARY HYPERTENSION: ICD-10-CM

## 2022-06-03 DIAGNOSIS — Z09 FOLLOW UP: Primary | ICD-10-CM

## 2022-06-03 DIAGNOSIS — E11.00 TYPE 2 DIABETES MELLITUS WITH HYPEROSMOLARITY WITHOUT COMA, WITH LONG-TERM CURRENT USE OF INSULIN: ICD-10-CM

## 2022-06-03 PROCEDURE — 99214 PR OFFICE/OUTPT VISIT, EST, LEVL IV, 30-39 MIN: ICD-10-PCS | Mod: S$GLB,,, | Performed by: NURSE PRACTITIONER

## 2022-06-03 PROCEDURE — 99214 OFFICE O/P EST MOD 30 MIN: CPT | Mod: S$GLB,,, | Performed by: NURSE PRACTITIONER

## 2022-06-03 PROCEDURE — 99999 PR PBB SHADOW E&M-EST. PATIENT-LVL V: CPT | Mod: PBBFAC,,, | Performed by: NURSE PRACTITIONER

## 2022-06-03 PROCEDURE — 99999 PR PBB SHADOW E&M-EST. PATIENT-LVL V: ICD-10-PCS | Mod: PBBFAC,,, | Performed by: NURSE PRACTITIONER

## 2022-06-03 RX ORDER — DICLOFENAC SODIUM 50 MG/1
50 TABLET, DELAYED RELEASE ORAL 2 TIMES DAILY PRN
Qty: 30 TABLET | Refills: 0 | Status: SHIPPED | OUTPATIENT
Start: 2022-06-03

## 2022-06-03 RX ORDER — MELOXICAM 7.5 MG/1
7.5 TABLET ORAL DAILY
COMMUNITY
Start: 2022-05-03 | End: 2024-03-15 | Stop reason: CLARIF

## 2022-06-03 RX ORDER — CYCLOBENZAPRINE HCL 5 MG
5 TABLET ORAL 2 TIMES DAILY PRN
Qty: 20 TABLET | Refills: 1 | Status: SHIPPED | OUTPATIENT
Start: 2022-06-03

## 2022-06-03 NOTE — PROGRESS NOTES
Subjective:       Patient ID: Pricilla Bower is a 62 y.o. female.    Chief Complaint: Arm Pain (Rt x 1 mo radiates down to fingers )    Patient presents for a follow up.  Continues to have right arm pain. Had ultrasound to upper swollen area-cyst noted. Meloxicam is not working.      Continue to have pain to right shoulder that radiates down arm.     Review of Systems   Constitutional: Negative for chills, fatigue and fever.   Respiratory: Negative for cough and shortness of breath.    Gastrointestinal: Negative for abdominal pain, constipation and diarrhea.   Musculoskeletal: Positive for arthralgias and myalgias.   Psychiatric/Behavioral: Negative for agitation and behavioral problems.         Objective:      Physical Exam  Vitals reviewed.   Constitutional:       Appearance: Normal appearance.   Cardiovascular:      Rate and Rhythm: Normal rate and regular rhythm.   Pulmonary:      Effort: Pulmonary effort is normal.      Breath sounds: Normal breath sounds.   Musculoskeletal:         General: Tenderness present.      Right shoulder: Tenderness present. Decreased range of motion.   Skin:     General: Skin is warm.   Neurological:      General: No focal deficit present.      Mental Status: She is alert and oriented to person, place, and time.   Psychiatric:         Mood and Affect: Mood normal.         Behavior: Behavior normal. Behavior is cooperative.         Assessment:       Problem List Items Addressed This Visit     Mixed hyperlipidemia    Relevant Orders    Lipid Panel    TSH    CBC Auto Differential    Diabetes mellitus, type 2    Relevant Orders    Hemoglobin A1C    Comprehensive Metabolic Panel    TSH    CBC Auto Differential    Microalbumin/Creatinine Ratio, Urine    Hypertension    Relevant Orders    Hemoglobin A1C    Comprehensive Metabolic Panel    TSH    CBC Auto Differential      Other Visit Diagnoses     Follow up    -  Primary    Primary osteoarthritis of right shoulder        Relevant  Medications    diclofenac (VOLTAREN) 50 MG EC tablet    Other Relevant Orders    Ambulatory referral/consult to Orthopedics          Plan:         Follow up    Mixed hyperlipidemia  -     Lipid Panel; Future; Expected date: 06/03/2022  -     TSH; Future; Expected date: 06/03/2022  -     CBC Auto Differential; Future; Expected date: 06/03/2022    Primary hypertension  -     Hemoglobin A1C; Future; Expected date: 06/03/2022  -     Comprehensive Metabolic Panel; Future; Expected date: 06/03/2022  -     TSH; Future; Expected date: 06/03/2022  -     CBC Auto Differential; Future; Expected date: 06/03/2022    Type 2 diabetes mellitus with hyperosmolarity without coma, with long-term current use of insulin  -     Hemoglobin A1C; Future; Expected date: 06/03/2022  -     Comprehensive Metabolic Panel; Future; Expected date: 06/03/2022  -     TSH; Future; Expected date: 06/03/2022  -     CBC Auto Differential; Future; Expected date: 06/03/2022  -     Microalbumin/Creatinine Ratio, Urine; Future; Expected date: 06/03/2022    Primary osteoarthritis of right shoulder  -     diclofenac (VOLTAREN) 50 MG EC tablet; Take 1 tablet (50 mg total) by mouth 2 (two) times daily as needed (pain). Take with food  Dispense: 30 tablet; Refill: 0  -     Ambulatory referral/consult to Orthopedics; Future; Expected date: 06/10/2022    Other orders  -     cyclobenzaprine (FLEXERIL) 5 MG tablet; Take 1 tablet (5 mg total) by mouth 2 (two) times daily as needed (muscle pain). Will cause drowsiness  Dispense: 20 tablet; Refill: 1        Labs in 6 months -fasting     Follow up one week after labs.     [facility] appointment.

## 2022-06-07 LAB
HPV HR 12 DNA SPEC QL NAA+PROBE: NEGATIVE
HPV16 AG SPEC QL: NEGATIVE
HPV18 DNA SPEC QL NAA+PROBE: NEGATIVE

## 2022-06-10 LAB
FINAL PATHOLOGIC DIAGNOSIS: NORMAL
Lab: NORMAL

## 2022-06-29 ENCOUNTER — TELEPHONE (OUTPATIENT)
Dept: ORTHOPEDICS | Facility: CLINIC | Age: 62
End: 2022-06-29
Payer: COMMERCIAL

## 2022-06-29 NOTE — TELEPHONE ENCOUNTER
Spoke w/ patient in regards to her request for sooner appt. Expressed to patient that she has the soonest appt available. Patient verbalized understanding and was grateful for the call_DD

## 2022-06-29 NOTE — TELEPHONE ENCOUNTER
----- Message from Chantel Crocker sent at 6/29/2022 10:48 AM CDT -----  Contact: pt  Type:  Same Day Appointment Request    Caller is requesting a same day appointment.  Caller declined first available appointment listed below.    Name of Caller: pt   When is the first available appointment?07/12 - pt has an appt on that date   Symptoms: osteoarthritis of r shoulder   Best Call Back Number: [phone]  Additional Information: pt states she's in a lot of pain

## 2022-07-01 DIAGNOSIS — M19.011 OSTEOARTHRITIS OF RIGHT SHOULDER, UNSPECIFIED OSTEOARTHRITIS TYPE: Primary | ICD-10-CM

## 2022-07-12 ENCOUNTER — OFFICE VISIT (OUTPATIENT)
Dept: ORTHOPEDICS | Facility: CLINIC | Age: 62
End: 2022-07-12
Payer: COMMERCIAL

## 2022-07-12 ENCOUNTER — HOSPITAL ENCOUNTER (OUTPATIENT)
Dept: RADIOLOGY | Facility: HOSPITAL | Age: 62
Discharge: HOME | End: 2022-07-12
Attending: STUDENT IN AN ORGANIZED HEALTH CARE EDUCATION/TRAINING PROGRAM
Payer: COMMERCIAL

## 2022-07-12 VITALS — WEIGHT: 185 LBS | BODY MASS INDEX: 29.03 KG/M2 | HEIGHT: 67 IN

## 2022-07-12 DIAGNOSIS — M19.011 OSTEOARTHRITIS OF RIGHT SHOULDER, UNSPECIFIED OSTEOARTHRITIS TYPE: ICD-10-CM

## 2022-07-12 DIAGNOSIS — M75.101 NONTRAUMATIC TEAR OF RIGHT ROTATOR CUFF, UNSPECIFIED TEAR EXTENT: Primary | ICD-10-CM

## 2022-07-12 DIAGNOSIS — M75.01 ADHESIVE CAPSULITIS OF RIGHT SHOULDER: ICD-10-CM

## 2022-07-12 DIAGNOSIS — M19.011 PRIMARY OSTEOARTHRITIS OF RIGHT SHOULDER: ICD-10-CM

## 2022-07-12 PROCEDURE — 99999 PR PBB SHADOW E&M-EST. PATIENT-LVL III: ICD-10-PCS | Mod: PBBFAC,,, | Performed by: STUDENT IN AN ORGANIZED HEALTH CARE EDUCATION/TRAINING PROGRAM

## 2022-07-12 PROCEDURE — 73030 X-RAY EXAM OF SHOULDER: CPT | Mod: TC,RT

## 2022-07-12 PROCEDURE — 99203 OFFICE O/P NEW LOW 30 MIN: CPT | Mod: S$GLB,,, | Performed by: STUDENT IN AN ORGANIZED HEALTH CARE EDUCATION/TRAINING PROGRAM

## 2022-07-12 PROCEDURE — 73030 XR SHOULDER COMPLETE 2 OR MORE VIEWS RIGHT: ICD-10-PCS | Mod: 26,RT,, | Performed by: RADIOLOGY

## 2022-07-12 PROCEDURE — 99999 PR PBB SHADOW E&M-EST. PATIENT-LVL III: CPT | Mod: PBBFAC,,, | Performed by: STUDENT IN AN ORGANIZED HEALTH CARE EDUCATION/TRAINING PROGRAM

## 2022-07-12 PROCEDURE — 73030 X-RAY EXAM OF SHOULDER: CPT | Mod: 26,RT,, | Performed by: RADIOLOGY

## 2022-07-12 PROCEDURE — 99203 PR OFFICE/OUTPT VISIT, NEW, LEVL III, 30-44 MIN: ICD-10-PCS | Mod: S$GLB,,, | Performed by: STUDENT IN AN ORGANIZED HEALTH CARE EDUCATION/TRAINING PROGRAM

## 2022-07-12 NOTE — PROGRESS NOTES
Orthopaedics Sports Medicine     Shoulder Initial Visit         7/12/2022    Referring MD: Dolores Kelly NP    Chief Complaint   Patient presents with    Right Shoulder - Pain         History of Present Illness:   Pricilla Bower is a 62 y.o. right-hand dominant female who presents with RIGHT shoulder pain and dysfunction.    Onset of the symptoms was 2 months ago.     Inciting event: No specific mechanism of injury but reports a gradual worsening of symptoms.      Current symptoms include dull, achy pain with movement and she also endorses pain with rest and night pain.     Pain is aggravated by movement, especially lifting, reaching       Evaluation to date: X-Ray    Treatment to date: Rest, activity modification, physician prescribed Mobic, Tylenol, Excedrin     Past Medical History:   Past Medical History:   Diagnosis Date    Diabetes mellitus, type 2 2010    A1c >14% on 1/29/20 & 11% on 3/8/19; Lost to F/U as of 7/2019; to ER for glu >500 at [facility] 1/23/20    Hyperlipidemia     Hypertension     Noncompliance with treatment plan     Uncontrolled DM2 due to noncompliance with insulin and follow up; A1c 11% on 3/8/19; Lost to F/U as of 7/2019; to ER for glu >500 at [facility] 1/23/20 -> A1c >14% on 1/29/20 -> rec f/u in 1 wk with [facility] log to demontrate understanding of insulin titration    Obesity (BMI 30.0-34.9)     Vitamin D deficiency        Past Surgical History:   Past Surgical History:   Procedure Laterality Date    NO PAST SURGERIES         Medications:  Patient's Medications   New Prescriptions    No medications on file   Previous Medications    AMLODIPINE (NORVASC) 5 MG TABLET    Take 1 tablet (5 mg total) by mouth once daily.    ASPIRIN 81 MG CHEW    Take 1 tablet (81 mg total) by mouth once daily.    ATORVASTATIN (LIPITOR) 40 MG TABLET    Take 1 tablet (40 mg total) by mouth every evening.    BLOOD-GLUCOSE METER (FREESTYLE SYSTEM KIT) KIT    Use as instructed    CYCLOBENZAPRINE (FLEXERIL) 5 MG  "TABLET    Take 1 tablet (5 mg total) by mouth 2 (two) times daily as needed (muscle pain). Will cause drowsiness    DICLOFENAC (VOLTAREN) 50 MG EC TABLET    Take 1 tablet (50 mg total) by mouth 2 (two) times daily as needed (pain). Take with food    MELOXICAM (MOBIC) 7.5 MG TABLET    Take 7.5 mg by mouth once daily.    METFORMIN (GLUCOPHAGE) 1000 MG TABLET    Take 1 tablet (1,000 mg total) by mouth 2 (two) times daily with meals.    UBIDECARENONE (COENZYME Q10) 100 MG TAB    Take 1 tablet (100 mg total) by mouth once daily.   Modified Medications    No medications on file   Discontinued Medications    No medications on file       Allergies:   Review of patient's allergies indicates:   Allergen Reactions    Darvocet a500 [propoxyphene n-acetaminophen] Nausea And Vomiting    Penicillins        Social History:   Home town: [place], [place]  Occupation: Management of furniture store.   Alcohol use: She reports no history of alcohol use.  Tobacco use: She reports that she has never smoked. She has never used smokeless tobacco.    Review of systems:  History of recent illness, fevers, shakes, or chills: no  History of cardiac problems or chest pain: no  History of pulmonary problems or asthma: no  History of diabetes: yes  History of prior dvt or clotting problems: no  History of sleep apnea: no      Physical Examination:  Estimated body mass index is 28.98 kg/m² as calculated from the following:    Height as of this encounter: 5' 7" (1.702 m).    Weight as of this encounter: 83.9 kg (185 lb).    General  Healthy appearing female in no acute distress  Alert and oriented, normal mood, appropriate affect    Shoulder Examination:  Patient is alert and oriented, no distress. Skin is intact. Mass/lipoma on anterior aspect of shoulder. Neuro is normal with no focal motor or sensory findings.    Cervical exam is unremarkable. Intact cervical ROM. Negative Spurling's test    Physical Exam:  RIGHT    LEFT    Scap " Dyskinesis/Winging (-)    (-)    Tenderness:          Greater Tuberosity             +    (-)  Bicipital Groove  (-)    (-)  AC joint   (-)    (-)  Other:     ROM:  Forward Elevation 150/170   180  Abduction  100/120   120  ER (at side)  60    70  IR   Back pocket   T8    Strength:   Supraspinatus  4/5    5/5  Infraspinatus  5/5    5/5  Subscap / IR  5/5    5/5     Special Tests:   Neer:    +    (-)   Liu:   (-)    (-)   SS Stress:   +    (-)   Bear Hug:   (-)    (-)   [place]'s:   (-)    (-)   Resisted Thrower's:   +    (-)   Cross Arm Abduction:  +    (-)    Neurovascular examination  - Motor grossly intact bilaterally to shoulder abduction, elbow flexion and extension, wrist flexion and extension, and intrinsic hand musculature  - Sensation intact to light touch bilaterally in axillary, median, radial, and ulnar distributions  - Symmetrical radial pulses      Imaging:  XR SHOULDER COMPLETE 2 OR MORE VIEWS RIGHT     CLINICAL HISTORY:  Primary osteoarthritis, right shoulder     TECHNIQUE:  Two or three views of the right shoulder were performed.     COMPARISON:  None     FINDINGS:  No acute osseous abnormality.  Glenohumeral joint space maintained.  Minimal calcification at the rotator cuff insertion suggesting calcific tendinosis.  AC joint degenerative findings present including undersurface spurring.  Lung parenchyma clear.     Impression:     As above        Electronically signed by: Ismael Rivera MD  Date:                                            07/12/2022     Physician Read: I agree with the above impression.      Impression:  62 y.o. female with right shoulder adhesive capsulitis, suspected rotator cuff tear.       Plan:  1. Discussed diagnosis and treatment options with patient today.  Her history and physical exam were consistent with rotator cuff tear as well as adhesive capsulitis.  2. She has tried multiple nonoperative treatments including rest, activity modification, and physician  prescribed medications.  The symptoms been going on for 2 months despite these interventions.  3. I recommend MRI of right shoulder to evaluate for intra-articular pathology, specifically rotator cuff tear.    4. Follow up with me after MRI.            Willie Vora MD    I, Mike Galarza, acted as a scribe for Willie Vora MD for the duration of this office visit.

## 2022-07-13 ENCOUNTER — HOSPITAL ENCOUNTER (OUTPATIENT)
Dept: RADIOLOGY | Facility: HOSPITAL | Age: 62
Discharge: HOME | End: 2022-07-13
Attending: STUDENT IN AN ORGANIZED HEALTH CARE EDUCATION/TRAINING PROGRAM
Payer: COMMERCIAL

## 2022-07-13 DIAGNOSIS — M75.01 ADHESIVE CAPSULITIS OF RIGHT SHOULDER: ICD-10-CM

## 2022-07-13 DIAGNOSIS — M75.101 NONTRAUMATIC TEAR OF RIGHT ROTATOR CUFF, UNSPECIFIED TEAR EXTENT: ICD-10-CM

## 2022-07-13 PROCEDURE — 73221 MRI SHOULDER WITHOUT CONTRAST RIGHT: ICD-10-PCS | Mod: 26,RT,, | Performed by: RADIOLOGY

## 2022-07-13 PROCEDURE — 73221 MRI JOINT UPR EXTREM W/O DYE: CPT | Mod: TC,RT

## 2022-07-13 PROCEDURE — 73221 MRI JOINT UPR EXTREM W/O DYE: CPT | Mod: 26,RT,, | Performed by: RADIOLOGY

## 2022-07-14 ENCOUNTER — TELEPHONE (OUTPATIENT)
Dept: ORTHOPEDICS | Facility: CLINIC | Age: 62
End: 2022-07-14
Payer: COMMERCIAL

## 2022-07-14 NOTE — TELEPHONE ENCOUNTER
Spoke with pt and let her know I will have Dr. Vora explain her MRI results to her tomorrow when he is in clinic

## 2022-07-14 NOTE — TELEPHONE ENCOUNTER
----- Message from Ayla Martin sent at 7/14/2022  4:13 PM CDT -----  Contact: Pt Pricilla@[phone]--  Test Results    Type of Test:--MRI--    Date of Test:--7/13/2021--    Communication Preference:--[phone]    Additional Information:Pt calling to speak with the nurse regarding the results.

## 2022-07-15 ENCOUNTER — TELEPHONE (OUTPATIENT)
Dept: INTERNAL MEDICINE | Facility: CLINIC | Age: 62
End: 2022-07-15
Payer: COMMERCIAL

## 2022-07-15 ENCOUNTER — TELEPHONE (OUTPATIENT)
Dept: ORTHOPEDICS | Facility: CLINIC | Age: 62
End: 2022-07-15
Payer: COMMERCIAL

## 2022-07-15 NOTE — TELEPHONE ENCOUNTER
----- Message from Ashely Shahid sent at 7/15/2022  2:21 PM CDT -----  Contact: [phone]  Pt is requesting a call in regards to seeing if she can be seen for her right shoulder. Pt stated that her appt with the orthopedist is not until 8/12 and she would like to see her pcp instead. Please call her back at [phone] to advise if she can do so. Thanks KB

## 2022-07-15 NOTE — TELEPHONE ENCOUNTER
I called the pt back and she was wanting results from her MRI that she did with ortho and was told by that depts staff that she will have to wait for her appt with them so . I informed her that if it was any urgency they would not have her wait but, I will send a note to the provider for review and documentation for her . She verbalized understanding.  Please advise once when you're able to review and document on her MRI as she's very worried and scared and would like some explanation as to whether this is the result of her pain . Thanks //kah

## 2022-07-15 NOTE — TELEPHONE ENCOUNTER
Spoke with patient regarding scheduling an appointment to review her MRI results. She would like to discuss with her primary care provider before proceeding with an appointment with ortho. She was informed to call us back if she would like to proceed with an ortho appointment. -NS    ----- Message from Norma Thompson sent at 7/15/2022  2:00 PM CDT -----  Contact: Pricilla Wheatley is calling to speak to the nurse regarding previous labs. Reports wanting results. Please give patient a call back [phone]

## 2022-08-12 ENCOUNTER — OFFICE VISIT (OUTPATIENT)
Dept: ORTHOPEDICS | Facility: CLINIC | Age: 62
End: 2022-08-12
Payer: COMMERCIAL

## 2022-08-12 VITALS — WEIGHT: 185 LBS | HEIGHT: 67 IN | BODY MASS INDEX: 29.03 KG/M2

## 2022-08-12 DIAGNOSIS — M75.121 NONTRAUMATIC COMPLETE TEAR OF RIGHT ROTATOR CUFF: Primary | ICD-10-CM

## 2022-08-12 PROCEDURE — 99999 PR PBB SHADOW E&M-EST. PATIENT-LVL III: ICD-10-PCS | Mod: PBBFAC,,, | Performed by: STUDENT IN AN ORGANIZED HEALTH CARE EDUCATION/TRAINING PROGRAM

## 2022-08-12 PROCEDURE — 99214 PR OFFICE/OUTPT VISIT, EST, LEVL IV, 30-39 MIN: ICD-10-PCS | Mod: 25,S$GLB,, | Performed by: STUDENT IN AN ORGANIZED HEALTH CARE EDUCATION/TRAINING PROGRAM

## 2022-08-12 PROCEDURE — 20610 DRAIN/INJ JOINT/BURSA W/O US: CPT | Mod: RT,S$GLB,, | Performed by: STUDENT IN AN ORGANIZED HEALTH CARE EDUCATION/TRAINING PROGRAM

## 2022-08-12 PROCEDURE — 20610 LARGE JOINT ASPIRATION/INJECTION: R SUBACROMIAL BURSA: ICD-10-PCS | Mod: RT,S$GLB,, | Performed by: STUDENT IN AN ORGANIZED HEALTH CARE EDUCATION/TRAINING PROGRAM

## 2022-08-12 PROCEDURE — 99214 OFFICE O/P EST MOD 30 MIN: CPT | Mod: 25,S$GLB,, | Performed by: STUDENT IN AN ORGANIZED HEALTH CARE EDUCATION/TRAINING PROGRAM

## 2022-08-12 PROCEDURE — 99999 PR PBB SHADOW E&M-EST. PATIENT-LVL III: CPT | Mod: PBBFAC,,, | Performed by: STUDENT IN AN ORGANIZED HEALTH CARE EDUCATION/TRAINING PROGRAM

## 2022-08-12 RX ORDER — TRIAMCINOLONE ACETONIDE 40 MG/ML
40 INJECTION, SUSPENSION INTRA-ARTICULAR; INTRAMUSCULAR
Status: DISCONTINUED | OUTPATIENT
Start: 2022-08-12 | End: 2022-08-12 | Stop reason: HOSPADM

## 2022-08-12 RX ADMIN — TRIAMCINOLONE ACETONIDE 40 MG: 40 INJECTION, SUSPENSION INTRA-ARTICULAR; INTRAMUSCULAR at 08:08

## 2022-08-12 NOTE — PROCEDURES
Large Joint Aspiration/Injection: R subacromial bursa    Date/Time: 8/12/2022 8:40 AM  Performed by: Willie Vora MD  Authorized by: Willie Vora MD     Consent Done?:  Yes (Verbal)  Indications:  Pain  Site marked: the procedure site was marked    Timeout: prior to procedure the correct patient, procedure, and site was verified    Prep: patient was prepped and draped in usual sterile fashion      Local anesthesia used?: Yes    Anesthesia:  Local infiltration  Local anesthetic:  Lidocaine 1% without epinephrine    Details:  Needle Size:  22 G  Ultrasonic Guidance for needle placement?: No    Approach:  Posterior  Location:  Shoulder  Site:  R subacromial bursa  Medications:  40 mg triamcinolone acetonide 40 mg/mL  Patient tolerance:  Patient tolerated the procedure well with no immediate complications

## 2022-08-12 NOTE — PROGRESS NOTES
Orthopaedic Follow-Up Visit    Last Appointment: 7/12/2022  Diagnosis: Right shoulder nontraumatic rotator cuff tear,adhesive capsulitis, glenohumeral arthritis   Prior Procedure: MRI of Right shoulder    Pricilla Bower is a 62 y.o. female who is here for f/u evaluation of Right shoulder. The patient was last seen here by me on 7/12/2022 at which point we decided to send her for MRI prior to considering further treatment options. The patient returns today reporting that the symptoms have worsened since her last visit and is interested in proceeding with further treatment options.     To review her history, Pricilla Bower is a 62 y.o. right-hand dominant female who presents with RIGHT shoulder pain and dysfunction. The onset of symptoms was 3 months ago. She does not recall a specific mechanism of injury but reports a gradual worsening of symptoms. Her current symptoms include dull, achy pain with movement and pain at rest and night. Her pain is aggravated by movement, lifting, and reaching. She has had an x-ray and MRI. Treatment to date has been rest, activity modification, physician prescribed mobic, tylenol, and excedrin.     Patient's medications, allergies, past medical, surgical, social and family histories were reviewed and updated as appropriate.    Review of Systems   All systems reviewed were negative.  Specifically, the patient denies fever, chills, weight loss, chest pain, shortness of breath, or dyspnea on exertion.      Past Medical History:   Diagnosis Date    Diabetes mellitus, type 2 2010    A1c >14% on 1/29/20 & 11% on 3/8/19; Lost to F/U as of 7/2019; to ER for glu >500 at [facility] 1/23/20    Hyperlipidemia     Hypertension     Noncompliance with treatment plan     Uncontrolled DM2 due to noncompliance with insulin and follow up; A1c 11% on 3/8/19; Lost to F/U as of 7/2019; to ER for glu >500 at [facility] 1/23/20 -> A1c >14% on 1/29/20 -> rec f/u in 1 wk with [facility] log to demontrate understanding  of insulin titration    Obesity (BMI 30.0-34.9)     Vitamin D deficiency        Past Surgical History:   Procedure Laterality Date    NO PAST SURGERIES         Patient's Medications   New Prescriptions    No medications on file   Previous Medications    AMLODIPINE (NORVASC) 5 MG TABLET    Take 1 tablet by mouth once daily    ASPIRIN 81 MG CHEW    Take 1 tablet (81 mg total) by mouth once daily.    ATORVASTATIN (LIPITOR) 40 MG TABLET    Take 1 tablet (40 mg total) by mouth every evening.    BLOOD-GLUCOSE METER (FREESTYLE SYSTEM KIT) KIT    Use as instructed    CYCLOBENZAPRINE (FLEXERIL) 5 MG TABLET    Take 1 tablet (5 mg total) by mouth 2 (two) times daily as needed (muscle pain). Will cause drowsiness    DICLOFENAC (VOLTAREN) 50 MG EC TABLET    Take 1 tablet (50 mg total) by mouth 2 (two) times daily as needed (pain). Take with food    MELOXICAM (MOBIC) 7.5 MG TABLET    Take 7.5 mg by mouth once daily.    METFORMIN (GLUCOPHAGE) 1000 MG TABLET    Take 1 tablet (1,000 mg total) by mouth 2 (two) times daily with meals.    UBIDECARENONE (COENZYME Q10) 100 MG TAB    Take 1 tablet (100 mg total) by mouth once daily.   Modified Medications    No medications on file   Discontinued Medications    No medications on file       Family History   Problem Relation Age of Onset    Cancer Mother         cervical    Breast cancer Mother        Review of patient's allergies indicates:   Allergen Reactions    Darvocet a500 [propoxyphene n-acetaminophen] Nausea And Vomiting    Penicillins          Objective:      Physical Exam  Patient is alert and oriented, no distress. Skin is intact. Neuro is normal with no focal motor or sensory findings.    Cervical exam is unremarkable. Intact cervical ROM. Negative Spurling's test    Physical Exam:  RIGHT    LEFT    Scap Dyskinesis/Winging (-)    (-)    Tenderness:          Greater Tuberosity  (+)    (-)  Bicipital Groove  (-)    (-)  AC joint   (-)    (-)  Other:     ROM:  Forward  Elevation 90    180  Abduction  80    120  ER (at side)  30    80  IR   Back pocket   T8    Strength:   Supraspinatus  4/5    5/5  Infraspinatus  4/5    5/5  Subscap / IR  4/5    5/5     Special Tests:   Neer:    +    (-)   Liu:   (-)    (-)   SS Stress:   +    (-)   Bear Hug:   (-)    (-)   [place]'s:   (-)    (-)   Resisted Thrower's:   +    (-)   Cross Arm Abduction:  +    (-)    Imaging:    MRI Shoulder Without Contrast Right  Narrative: EXAMINATION:  MRI SHOULDER WITHOUT CONTRAST RIGHT    CLINICAL HISTORY:  chronic shoulder pain with suspected rotator cuff tear.;  Unspecified rotator cuff tear or rupture of right shoulder, not specified as traumatic    TECHNIQUE:  Multiplanar multislice images are were performed through the right shoulder.    COMPARISON:  None    FINDINGS:  Full-thickness near full width tear of the supraspinatus tendon with partial-thickness tearing of the anterior fibers of the infraspinatus tendon.  Moderate grade partial-thickness tearing of the subscapularis tendon.  The teres minor tendon is unremarkable.    The long head of the biceps tendon is grossly intact.  The glenoid labrum is unremarkable.    A joint effusion is noted acute setting with the subacromial/subdeltoid bursa through full-thickness rotator cuff defect.    Moderate acromioclavicular osteoarthritis with subacromial enthesophyte.  Impression: Full-thickness near full width supraspinatus tendon tear.    Partial-thickness tearing of the subscapularis and anterior fibers of the infraspinatus tendons at their insertions.    Osteoarthritis with subacromial enthesophyte.    Electronically signed by: Mark Medina  Date:    07/13/2022  Time:    17:37       Physician read: I agree with the above impression.    Assessment/Plan:   Pricilla Bower is a 62 y.o. female with Right shoulder nontraumatic rotator cuff tear    Plan:    1. Discussed diagnosis and treatment options with her today.  She has a right shoulder full  thickness rotator cuff tear.  2. She has tried rest, activity modification, oral anti-inflammatories continues to be symptomatic.  She is not interested in operative treatment at this time, but may be after the start of next year.  3. We will proceed with corticosteroid injection into the right shoulder today.  This was administered and tolerated well with no immediate complications.  4. Follow up in December to discuss surgery          Willie Vora MD    I, Janny Patton, acted as a scribe for Willie Vora MD for the duration of this office visit.

## 2022-09-16 ENCOUNTER — PATIENT OUTREACH (OUTPATIENT)
Dept: ADMINISTRATIVE | Facility: HOSPITAL | Age: 62
End: 2022-09-16
Payer: COMMERCIAL

## 2022-11-15 ENCOUNTER — PATIENT OUTREACH (OUTPATIENT)
Dept: ADMINISTRATIVE | Facility: HOSPITAL | Age: 62
End: 2022-11-15

## 2022-11-18 ENCOUNTER — PATIENT OUTREACH (OUTPATIENT)
Dept: ADMINISTRATIVE | Facility: HOSPITAL | Age: 62
End: 2022-11-18

## 2022-11-18 NOTE — PROGRESS NOTES
Attempted to contact patient by phone for a Diabetic Eye Screening , was not able to leave voice mail message. Box full.

## 2022-12-19 ENCOUNTER — PATIENT OUTREACH (OUTPATIENT)
Dept: ADMINISTRATIVE | Facility: HOSPITAL | Age: 62
End: 2022-12-19

## 2022-12-19 NOTE — PROGRESS NOTES
Working the mammogram report: Called patient to discuss scheduling a mammogram patient agreed to schedule 02/02/23

## 2023-01-03 ENCOUNTER — PATIENT OUTREACH (OUTPATIENT)
Dept: ADMINISTRATIVE | Facility: HOSPITAL | Age: 63
End: 2023-01-03

## 2023-01-03 NOTE — PROGRESS NOTES
DM Eye Exam:  per chart review last eye exam 08/25/20 Dr Maurisio Brewster, care teams updated.  Attempted to contact pt no ans, lvm.

## 2023-01-25 ENCOUNTER — PATIENT MESSAGE (OUTPATIENT)
Dept: ADMINISTRATIVE | Facility: HOSPITAL | Age: 63
End: 2023-01-25

## 2023-02-07 ENCOUNTER — TELEPHONE (OUTPATIENT)
Dept: ORTHOPEDICS | Facility: CLINIC | Age: 63
End: 2023-02-07

## 2023-02-07 NOTE — TELEPHONE ENCOUNTER
Spoke with patient regarding missed appointment, she stated she was getting Medicare and waiting on the cards to come in. She will give us a call after to get scheduled. Patient was grateful for the call. -NS

## 2023-02-15 ENCOUNTER — PATIENT OUTREACH (OUTPATIENT)
Dept: ADMINISTRATIVE | Facility: HOSPITAL | Age: 63
End: 2023-02-15

## 2023-02-15 NOTE — PROGRESS NOTES
Attempted to contact patient by phone for a MAMMOGRAM SCHEDULING, was unable to leave voice mail message.  Mail box full.  Updated Care Everywhere and Team.

## 2023-03-01 ENCOUNTER — PATIENT MESSAGE (OUTPATIENT)
Dept: ADMINISTRATIVE | Facility: HOSPITAL | Age: 63
End: 2023-03-01

## 2023-03-15 ENCOUNTER — PATIENT OUTREACH (OUTPATIENT)
Dept: ADMINISTRATIVE | Facility: HOSPITAL | Age: 63
End: 2023-03-15

## 2023-03-15 NOTE — PROGRESS NOTES
DM Eye Exam: Per chart review pt is overdue for eye exam, called to offer scheduling, the person that answered said pt was not available and hung up before I could leave message.

## 2023-04-19 ENCOUNTER — PATIENT MESSAGE (OUTPATIENT)
Dept: ADMINISTRATIVE | Facility: HOSPITAL | Age: 63
End: 2023-04-19

## 2023-05-30 ENCOUNTER — PATIENT OUTREACH (OUTPATIENT)
Dept: ADMINISTRATIVE | Facility: HOSPITAL | Age: 63
End: 2023-05-30
Payer: MEDICARE

## 2023-05-30 DIAGNOSIS — I10 ESSENTIAL HYPERTENSION: ICD-10-CM

## 2023-05-30 NOTE — TELEPHONE ENCOUNTER
Lv 6/3/22    ----- Message from Misa Ames sent at 5/30/2023  9:48 AM CDT -----  Contact: Patient, [phone]  Requesting an RX refill or new RX.  Is this a refill or new RX: Refill  RX name and strength (copy/paste from chart):  amLODIPine (NORVASC) 5 MG tablet  Is this a 30 day or 90 day RX: 90  Pharmacy name and phone # (copy/paste from chart):    [facility] [address] [place], [place] [id number] [address] [address]  Phone: [phone] Fax: [phone]  The doctors have asked that we provide their patients with the following 2 reminders -- prescription refills can take up to 72 hours, and a friendly reminder that in the future you can use your [facility] account to request refills: Yes

## 2023-05-30 NOTE — PROGRESS NOTES
DM EYE REPORT: Per chart review dm eye exam overdue, pt eye appt was canceled by staff for 05/10/23, pt states MD was going to be on vacation, rescheduled pt 08/02/23.

## 2023-05-31 RX ORDER — AMLODIPINE BESYLATE 5 MG/1
5 TABLET ORAL DAILY
Qty: 90 TABLET | Refills: 0 | Status: SHIPPED | OUTPATIENT
Start: 2023-05-31 | End: 2023-09-06

## 2023-06-07 ENCOUNTER — OFFICE VISIT (OUTPATIENT)
Dept: INTERNAL MEDICINE | Facility: CLINIC | Age: 63
End: 2023-06-07
Payer: MEDICARE

## 2023-06-07 ENCOUNTER — LAB VISIT (OUTPATIENT)
Dept: LAB | Facility: HOSPITAL | Age: 63
End: 2023-06-07
Attending: NURSE PRACTITIONER
Payer: MEDICARE

## 2023-06-07 VITALS
HEART RATE: 70 BPM | OXYGEN SATURATION: 99 % | WEIGHT: 184.94 LBS | TEMPERATURE: 98 F | DIASTOLIC BLOOD PRESSURE: 76 MMHG | HEIGHT: 67 IN | SYSTOLIC BLOOD PRESSURE: 122 MMHG | RESPIRATION RATE: 18 BRPM | BODY MASS INDEX: 29.03 KG/M2

## 2023-06-07 DIAGNOSIS — I10 PRIMARY HYPERTENSION: ICD-10-CM

## 2023-06-07 DIAGNOSIS — Z12.11 COLON CANCER SCREENING: ICD-10-CM

## 2023-06-07 DIAGNOSIS — Z12.31 BREAST CANCER SCREENING BY MAMMOGRAM: ICD-10-CM

## 2023-06-07 DIAGNOSIS — E78.2 MIXED HYPERLIPIDEMIA: ICD-10-CM

## 2023-06-07 DIAGNOSIS — Z00.00 ANNUAL PHYSICAL EXAM: ICD-10-CM

## 2023-06-07 DIAGNOSIS — B35.3 TINEA PEDIS OF BOTH FEET: ICD-10-CM

## 2023-06-07 DIAGNOSIS — Z78.0 POST-MENOPAUSAL: ICD-10-CM

## 2023-06-07 DIAGNOSIS — Z00.00 ANNUAL PHYSICAL EXAM: Primary | ICD-10-CM

## 2023-06-07 DIAGNOSIS — E11.00 TYPE 2 DIABETES MELLITUS WITH HYPEROSMOLARITY WITHOUT COMA, WITH LONG-TERM CURRENT USE OF INSULIN: ICD-10-CM

## 2023-06-07 DIAGNOSIS — B35.1 NAIL FUNGUS: ICD-10-CM

## 2023-06-07 DIAGNOSIS — Z79.4 TYPE 2 DIABETES MELLITUS WITH HYPEROSMOLARITY WITHOUT COMA, WITH LONG-TERM CURRENT USE OF INSULIN: ICD-10-CM

## 2023-06-07 LAB
ALBUMIN SERPL BCP-MCNC: 4.1 G/DL (ref 3.5–5.2)
ALP SERPL-CCNC: 71 U/L (ref 55–135)
ALT SERPL W/O P-5'-P-CCNC: 30 U/L (ref 10–44)
ANION GAP SERPL CALC-SCNC: 10 MMOL/L (ref 8–16)
AST SERPL-CCNC: 24 U/L (ref 10–40)
BASOPHILS # BLD AUTO: 0.03 K/UL (ref 0–0.2)
BASOPHILS NFR BLD: 0.5 % (ref 0–1.9)
BILIRUB SERPL-MCNC: 0.6 MG/DL (ref 0.1–1)
BUN SERPL-MCNC: 12 MG/DL (ref 8–23)
CALCIUM SERPL-MCNC: 9.9 MG/DL (ref 8.7–10.5)
CHLORIDE SERPL-SCNC: 102 MMOL/L (ref 95–110)
CHOLEST SERPL-MCNC: 135 MG/DL (ref 120–199)
CHOLEST/HDLC SERPL: 3.9 {RATIO} (ref 2–5)
CO2 SERPL-SCNC: 26 MMOL/L (ref 23–29)
CREAT SERPL-MCNC: 0.8 MG/DL (ref 0.5–1.4)
DIFFERENTIAL METHOD: NORMAL
EOSINOPHIL # BLD AUTO: 0.1 K/UL (ref 0–0.5)
EOSINOPHIL NFR BLD: 2.1 % (ref 0–8)
ERYTHROCYTE [DISTWIDTH] IN BLOOD BY AUTOMATED COUNT: 13.9 % (ref 11.5–14.5)
EST. GFR  (NO RACE VARIABLE): >60 ML/MIN/1.73 M^2
ESTIMATED AVG GLUCOSE: 177 MG/DL (ref 68–131)
GLUCOSE SERPL-MCNC: 221 MG/DL (ref 70–110)
HBA1C MFR BLD: 7.8 % (ref 4–5.6)
HCT VFR BLD AUTO: 39.5 % (ref 37–48.5)
HDLC SERPL-MCNC: 35 MG/DL (ref 40–75)
HDLC SERPL: 25.9 % (ref 20–50)
HGB BLD-MCNC: 12.7 G/DL (ref 12–16)
IMM GRANULOCYTES # BLD AUTO: 0.02 K/UL (ref 0–0.04)
IMM GRANULOCYTES NFR BLD AUTO: 0.3 % (ref 0–0.5)
LDLC SERPL CALC-MCNC: 71 MG/DL (ref 63–159)
LYMPHOCYTES # BLD AUTO: 1.7 K/UL (ref 1–4.8)
LYMPHOCYTES NFR BLD: 24.9 % (ref 18–48)
MCH RBC QN AUTO: 29.3 PG (ref 27–31)
MCHC RBC AUTO-ENTMCNC: 32.2 G/DL (ref 32–36)
MCV RBC AUTO: 91 FL (ref 82–98)
MONOCYTES # BLD AUTO: 0.4 K/UL (ref 0.3–1)
MONOCYTES NFR BLD: 5.7 % (ref 4–15)
NEUTROPHILS # BLD AUTO: 4.4 K/UL (ref 1.8–7.7)
NEUTROPHILS NFR BLD: 66.5 % (ref 38–73)
NONHDLC SERPL-MCNC: 100 MG/DL
NRBC BLD-RTO: 0 /100 WBC
PLATELET # BLD AUTO: 296 K/UL (ref 150–450)
PMV BLD AUTO: 10.9 FL (ref 9.2–12.9)
POTASSIUM SERPL-SCNC: 4.2 MMOL/L (ref 3.5–5.1)
PROT SERPL-MCNC: 7.7 G/DL (ref 6–8.4)
RBC # BLD AUTO: 4.34 M/UL (ref 4–5.4)
SODIUM SERPL-SCNC: 138 MMOL/L (ref 136–145)
TRIGL SERPL-MCNC: 145 MG/DL (ref 30–150)
WBC # BLD AUTO: 6.66 K/UL (ref 3.9–12.7)

## 2023-06-07 PROCEDURE — 3074F SYST BP LT 130 MM HG: CPT | Mod: HCNC,CPTII,S$GLB, | Performed by: NURSE PRACTITIONER

## 2023-06-07 PROCEDURE — 99999 PR PBB SHADOW E&M-EST. PATIENT-LVL V: ICD-10-PCS | Mod: PBBFAC,HCNC,, | Performed by: NURSE PRACTITIONER

## 2023-06-07 PROCEDURE — 1101F PR PT FALLS ASSESS DOC 0-1 FALLS W/OUT INJ PAST YR: ICD-10-PCS | Mod: HCNC,CPTII,S$GLB, | Performed by: NURSE PRACTITIONER

## 2023-06-07 PROCEDURE — 99999 PR PBB SHADOW E&M-EST. PATIENT-LVL V: CPT | Mod: PBBFAC,HCNC,, | Performed by: NURSE PRACTITIONER

## 2023-06-07 PROCEDURE — 99397 PR PREVENTIVE VISIT,EST,65 & OVER: ICD-10-PCS | Mod: HCNC,S$GLB,, | Performed by: NURSE PRACTITIONER

## 2023-06-07 PROCEDURE — 3288F PR FALLS RISK ASSESSMENT DOCUMENTED: ICD-10-PCS | Mod: HCNC,CPTII,S$GLB, | Performed by: NURSE PRACTITIONER

## 2023-06-07 PROCEDURE — 99397 PER PM REEVAL EST PAT 65+ YR: CPT | Mod: HCNC,S$GLB,, | Performed by: NURSE PRACTITIONER

## 2023-06-07 PROCEDURE — 3074F PR MOST RECENT SYSTOLIC BLOOD PRESSURE < 130 MM HG: ICD-10-PCS | Mod: HCNC,CPTII,S$GLB, | Performed by: NURSE PRACTITIONER

## 2023-06-07 PROCEDURE — 83036 HEMOGLOBIN GLYCOSYLATED A1C: CPT | Mod: HCNC | Performed by: NURSE PRACTITIONER

## 2023-06-07 PROCEDURE — 84443 ASSAY THYROID STIM HORMONE: CPT | Mod: HCNC | Performed by: NURSE PRACTITIONER

## 2023-06-07 PROCEDURE — 3008F BODY MASS INDEX DOCD: CPT | Mod: HCNC,CPTII,S$GLB, | Performed by: NURSE PRACTITIONER

## 2023-06-07 PROCEDURE — 1101F PT FALLS ASSESS-DOCD LE1/YR: CPT | Mod: HCNC,CPTII,S$GLB, | Performed by: NURSE PRACTITIONER

## 2023-06-07 PROCEDURE — 3078F DIAST BP <80 MM HG: CPT | Mod: HCNC,CPTII,S$GLB, | Performed by: NURSE PRACTITIONER

## 2023-06-07 PROCEDURE — 3008F PR BODY MASS INDEX (BMI) DOCUMENTED: ICD-10-PCS | Mod: HCNC,CPTII,S$GLB, | Performed by: NURSE PRACTITIONER

## 2023-06-07 PROCEDURE — 85025 COMPLETE CBC W/AUTO DIFF WBC: CPT | Mod: HCNC | Performed by: NURSE PRACTITIONER

## 2023-06-07 PROCEDURE — 1159F PR MEDICATION LIST DOCUMENTED IN MEDICAL RECORD: ICD-10-PCS | Mod: HCNC,CPTII,S$GLB, | Performed by: NURSE PRACTITIONER

## 2023-06-07 PROCEDURE — 1160F RVW MEDS BY RX/DR IN RCRD: CPT | Mod: HCNC,CPTII,S$GLB, | Performed by: NURSE PRACTITIONER

## 2023-06-07 PROCEDURE — 3078F PR MOST RECENT DIASTOLIC BLOOD PRESSURE < 80 MM HG: ICD-10-PCS | Mod: HCNC,CPTII,S$GLB, | Performed by: NURSE PRACTITIONER

## 2023-06-07 PROCEDURE — 1159F MED LIST DOCD IN RCRD: CPT | Mod: HCNC,CPTII,S$GLB, | Performed by: NURSE PRACTITIONER

## 2023-06-07 PROCEDURE — 80061 LIPID PANEL: CPT | Mod: HCNC | Performed by: NURSE PRACTITIONER

## 2023-06-07 PROCEDURE — 36415 COLL VENOUS BLD VENIPUNCTURE: CPT | Mod: HCNC,PO | Performed by: NURSE PRACTITIONER

## 2023-06-07 PROCEDURE — 1160F PR REVIEW ALL MEDS BY PRESCRIBER/CLIN PHARMACIST DOCUMENTED: ICD-10-PCS | Mod: HCNC,CPTII,S$GLB, | Performed by: NURSE PRACTITIONER

## 2023-06-07 PROCEDURE — 80053 COMPREHEN METABOLIC PANEL: CPT | Mod: HCNC | Performed by: NURSE PRACTITIONER

## 2023-06-07 PROCEDURE — 3288F FALL RISK ASSESSMENT DOCD: CPT | Mod: HCNC,CPTII,S$GLB, | Performed by: NURSE PRACTITIONER

## 2023-06-07 RX ORDER — CICLOPIROX 80 MG/ML
SOLUTION TOPICAL
Qty: 6.6 ML | Refills: 5 | Status: SHIPPED | OUTPATIENT
Start: 2023-06-07

## 2023-06-07 RX ORDER — KETOCONAZOLE 20 MG/G
CREAM TOPICAL 2 TIMES DAILY
Qty: 30 G | Refills: 1 | Status: SHIPPED | OUTPATIENT
Start: 2023-06-07

## 2023-06-07 NOTE — PROGRESS NOTES
Subjective     Patient ID: Pricilla Bower is a 63 y.o. female.    Chief Complaint: Annual Exam    Patient presents for annual exam.  Doing well.     Medical History: Bilateral foot pain, HLP, .HTN, Type II DM, Vit D Def, Peripheral Neuropathy, Overweight    Has eye exam scheduled for August.      Wants to have colon screening in Nov 2023.     Review of Systems   Constitutional:  Negative for chills, fatigue and fever.   Eyes:  Negative for visual disturbance.   Respiratory:  Negative for cough and shortness of breath.    Cardiovascular:  Negative for chest pain and leg swelling.   Gastrointestinal:  Negative for abdominal pain, constipation and diarrhea.   Psychiatric/Behavioral:  Negative for agitation and confusion.         Objective     Physical Exam  Vitals reviewed.   Constitutional:       Appearance: Normal appearance.   HENT:      Head: Normocephalic.      Right Ear: Tympanic membrane normal.      Left Ear: Tympanic membrane normal.      Mouth/Throat:      Pharynx: No posterior oropharyngeal erythema.   Eyes:      Extraocular Movements: Extraocular movements intact.      Pupils: Pupils are equal, round, and reactive to light.   Cardiovascular:      Rate and Rhythm: Normal rate and regular rhythm.      Pulses:           Dorsalis pedis pulses are 2+ on the right side and 2+ on the left side.   Pulmonary:      Effort: Pulmonary effort is normal.      Breath sounds: Normal breath sounds.   Abdominal:      General: Bowel sounds are normal. There is no distension.      Tenderness: There is no abdominal tenderness.   Musculoskeletal:         General: Normal range of motion.   Feet:      Right foot:      Protective Sensation: 6 sites tested.  6 sites sensed.      Skin integrity: Dry skin present.      Toenail Condition: Right toenails are abnormally thick.      Left foot:      Protective Sensation: 6 sites tested.  6 sites sensed.      Skin integrity: Dry skin present.      Toenail Condition: Left toenails are  abnormally thick.   Skin:     General: Skin is warm.      Comments: Right thumb nail: nail polish noted.  Signs of nail bed lifting and thickness.  Recommend to remove polish in order to treat.    Neurological:      General: No focal deficit present.      Mental Status: She is alert and oriented to person, place, and time.   Psychiatric:         Mood and Affect: Mood normal.         Behavior: Behavior normal. Behavior is cooperative.          Assessment and Plan     1. Annual physical exam  -     HEMOGLOBIN A1C; Future; Expected date: 06/07/2023  -     Microalbumin/Creatinine Ratio, Urine; Future; Expected date: 06/07/2023  -     TSH; Future; Expected date: 06/07/2023  -     CBC Auto Differential; Future; Expected date: 06/07/2023  -     Comprehensive Metabolic Panel; Future; Expected date: 06/07/2023  -     LIPID PANEL; Future; Expected date: 06/07/2023    2. Colon cancer screening  -     Ambulatory referral/consult to Endo Procedure ; Future; Expected date: 06/08/2023    3. Breast cancer screening by mammogram  -     Mammo Digital Screening Bilat w/ Cricket; Future; Expected date: 06/07/2023    4. Post-menopausal  -     DXA Bone Density Axial Skeleton 1 or more sites; Future; Expected date: 06/07/2023    5. Mixed hyperlipidemia  -     LIPID PANEL; Future; Expected date: 06/07/2023    6. Primary hypertension  -     TSH; Future; Expected date: 06/07/2023  -     CBC Auto Differential; Future; Expected date: 06/07/2023  -     Comprehensive Metabolic Panel; Future; Expected date: 06/07/2023    7. Type 2 diabetes mellitus with hyperosmolarity without coma, with long-term current use of insulin  -     HEMOGLOBIN A1C; Future; Expected date: 06/07/2023  -     Microalbumin/Creatinine Ratio, Urine; Future; Expected date: 06/07/2023  -     Comprehensive Metabolic Panel; Future; Expected date: 06/07/2023    8. Nail fungus  -     ciclopirox (PENLAC) 8 % Soln; Apply to fingernails and adjacent skin once daily in  combination with weekly nail trimming. Remove with alcohol every 7 days; continue therapy until nail clears  Dispense: 6.6 mL; Refill: 5    9. Tinea pedis of both feet  -     ketoconazole (NIZORAL) 2 % cream; Apply topically 2 (two) times daily. Continue use for 1 week after resolution of fungal rash.  Dispense: 30 g; Refill: 1      Labs today--fasting     Schedule mammo and DEXA-same day [facility]--non urgent around 9 am.        Follow up in about 6 months (around 12/7/2023).

## 2023-06-08 LAB — TSH SERPL DL<=0.005 MIU/L-ACNC: 1.68 UIU/ML (ref 0.4–4)

## 2023-06-13 ENCOUNTER — TELEPHONE (OUTPATIENT)
Dept: INTERNAL MEDICINE | Facility: CLINIC | Age: 63
End: 2023-06-13
Payer: MEDICARE

## 2023-06-13 NOTE — TELEPHONE ENCOUNTER
S/w pt, requested what A1C level was, verbalized understanding.     ----- Message from Alana Cisneros sent at 6/13/2023  2:35 PM CDT -----  Pt would like a call back and stated it is important. Call back number is [phone]. Thx. EL

## 2023-06-14 ENCOUNTER — HOSPITAL ENCOUNTER (OUTPATIENT)
Dept: RADIOLOGY | Facility: HOSPITAL | Age: 63
Discharge: HOME | End: 2023-06-14
Attending: NURSE PRACTITIONER
Payer: MEDICARE

## 2023-06-14 VITALS — WEIGHT: 183 LBS | HEIGHT: 67 IN | BODY MASS INDEX: 28.72 KG/M2

## 2023-06-14 DIAGNOSIS — Z12.31 BREAST CANCER SCREENING BY MAMMOGRAM: ICD-10-CM

## 2023-06-14 PROCEDURE — 77067 SCR MAMMO BI INCL CAD: CPT | Mod: TC,HCNC

## 2023-06-14 PROCEDURE — 77067 SCR MAMMO BI INCL CAD: CPT | Mod: 26,HCNC,, | Performed by: RADIOLOGY

## 2023-06-14 PROCEDURE — 77063 BREAST TOMOSYNTHESIS BI: CPT | Mod: 26,HCNC,, | Performed by: RADIOLOGY

## 2023-06-14 PROCEDURE — 77063 MAMMO DIGITAL SCREENING BILAT WITH TOMO: ICD-10-PCS | Mod: 26,HCNC,, | Performed by: RADIOLOGY

## 2023-06-14 PROCEDURE — 77067 MAMMO DIGITAL SCREENING BILAT WITH TOMO: ICD-10-PCS | Mod: 26,HCNC,, | Performed by: RADIOLOGY

## 2023-06-23 ENCOUNTER — OFFICE VISIT (OUTPATIENT)
Dept: INTERNAL MEDICINE | Facility: CLINIC | Age: 63
End: 2023-06-23
Payer: MEDICARE

## 2023-06-23 VITALS
DIASTOLIC BLOOD PRESSURE: 84 MMHG | WEIGHT: 185.5 LBS | OXYGEN SATURATION: 100 % | SYSTOLIC BLOOD PRESSURE: 136 MMHG | RESPIRATION RATE: 14 BRPM | HEART RATE: 78 BPM | BODY MASS INDEX: 29.06 KG/M2 | TEMPERATURE: 98 F

## 2023-06-23 DIAGNOSIS — E11.00 TYPE 2 DIABETES MELLITUS WITH HYPEROSMOLARITY WITHOUT COMA, WITH LONG-TERM CURRENT USE OF INSULIN: ICD-10-CM

## 2023-06-23 DIAGNOSIS — I10 PRIMARY HYPERTENSION: ICD-10-CM

## 2023-06-23 DIAGNOSIS — Z79.4 TYPE 2 DIABETES MELLITUS WITH HYPEROSMOLARITY WITHOUT COMA, WITH LONG-TERM CURRENT USE OF INSULIN: ICD-10-CM

## 2023-06-23 DIAGNOSIS — Z09 FOLLOW-UP EXAM: Primary | ICD-10-CM

## 2023-06-23 DIAGNOSIS — E78.2 MIXED HYPERLIPIDEMIA: ICD-10-CM

## 2023-06-23 PROCEDURE — 3051F HG A1C>EQUAL 7.0%<8.0%: CPT | Mod: HCNC,CPTII,S$GLB, | Performed by: NURSE PRACTITIONER

## 2023-06-23 PROCEDURE — 3060F PR POS MICROALBUMINURIA RESULT DOCUMENTED/REVIEW: ICD-10-PCS | Mod: HCNC,CPTII,S$GLB, | Performed by: NURSE PRACTITIONER

## 2023-06-23 PROCEDURE — 3060F POS MICROALBUMINURIA REV: CPT | Mod: HCNC,CPTII,S$GLB, | Performed by: NURSE PRACTITIONER

## 2023-06-23 PROCEDURE — 3066F PR DOCUMENTATION OF TREATMENT FOR NEPHROPATHY: ICD-10-PCS | Mod: HCNC,CPTII,S$GLB, | Performed by: NURSE PRACTITIONER

## 2023-06-23 PROCEDURE — 3075F SYST BP GE 130 - 139MM HG: CPT | Mod: HCNC,CPTII,S$GLB, | Performed by: NURSE PRACTITIONER

## 2023-06-23 PROCEDURE — 1159F PR MEDICATION LIST DOCUMENTED IN MEDICAL RECORD: ICD-10-PCS | Mod: HCNC,CPTII,S$GLB, | Performed by: NURSE PRACTITIONER

## 2023-06-23 PROCEDURE — 3051F PR MOST RECENT HEMOGLOBIN A1C LEVEL 7.0 - < 8.0%: ICD-10-PCS | Mod: HCNC,CPTII,S$GLB, | Performed by: NURSE PRACTITIONER

## 2023-06-23 PROCEDURE — 99213 OFFICE O/P EST LOW 20 MIN: CPT | Mod: HCNC,S$GLB,, | Performed by: NURSE PRACTITIONER

## 2023-06-23 PROCEDURE — 1159F MED LIST DOCD IN RCRD: CPT | Mod: HCNC,CPTII,S$GLB, | Performed by: NURSE PRACTITIONER

## 2023-06-23 PROCEDURE — 1101F PT FALLS ASSESS-DOCD LE1/YR: CPT | Mod: HCNC,CPTII,S$GLB, | Performed by: NURSE PRACTITIONER

## 2023-06-23 PROCEDURE — 3066F NEPHROPATHY DOC TX: CPT | Mod: HCNC,CPTII,S$GLB, | Performed by: NURSE PRACTITIONER

## 2023-06-23 PROCEDURE — 1101F PR PT FALLS ASSESS DOC 0-1 FALLS W/OUT INJ PAST YR: ICD-10-PCS | Mod: HCNC,CPTII,S$GLB, | Performed by: NURSE PRACTITIONER

## 2023-06-23 PROCEDURE — 1160F PR REVIEW ALL MEDS BY PRESCRIBER/CLIN PHARMACIST DOCUMENTED: ICD-10-PCS | Mod: HCNC,CPTII,S$GLB, | Performed by: NURSE PRACTITIONER

## 2023-06-23 PROCEDURE — 1160F RVW MEDS BY RX/DR IN RCRD: CPT | Mod: HCNC,CPTII,S$GLB, | Performed by: NURSE PRACTITIONER

## 2023-06-23 PROCEDURE — 3075F PR MOST RECENT SYSTOLIC BLOOD PRESS GE 130-139MM HG: ICD-10-PCS | Mod: HCNC,CPTII,S$GLB, | Performed by: NURSE PRACTITIONER

## 2023-06-23 PROCEDURE — 3008F BODY MASS INDEX DOCD: CPT | Mod: HCNC,CPTII,S$GLB, | Performed by: NURSE PRACTITIONER

## 2023-06-23 PROCEDURE — 99999 PR PBB SHADOW E&M-EST. PATIENT-LVL IV: ICD-10-PCS | Mod: PBBFAC,HCNC,, | Performed by: NURSE PRACTITIONER

## 2023-06-23 PROCEDURE — 99213 PR OFFICE/OUTPT VISIT, EST, LEVL III, 20-29 MIN: ICD-10-PCS | Mod: HCNC,S$GLB,, | Performed by: NURSE PRACTITIONER

## 2023-06-23 PROCEDURE — 3288F FALL RISK ASSESSMENT DOCD: CPT | Mod: HCNC,CPTII,S$GLB, | Performed by: NURSE PRACTITIONER

## 2023-06-23 PROCEDURE — 3008F PR BODY MASS INDEX (BMI) DOCUMENTED: ICD-10-PCS | Mod: HCNC,CPTII,S$GLB, | Performed by: NURSE PRACTITIONER

## 2023-06-23 PROCEDURE — 3079F DIAST BP 80-89 MM HG: CPT | Mod: HCNC,CPTII,S$GLB, | Performed by: NURSE PRACTITIONER

## 2023-06-23 PROCEDURE — 3288F PR FALLS RISK ASSESSMENT DOCUMENTED: ICD-10-PCS | Mod: HCNC,CPTII,S$GLB, | Performed by: NURSE PRACTITIONER

## 2023-06-23 PROCEDURE — 99999 PR PBB SHADOW E&M-EST. PATIENT-LVL IV: CPT | Mod: PBBFAC,HCNC,, | Performed by: NURSE PRACTITIONER

## 2023-06-23 PROCEDURE — 3079F PR MOST RECENT DIASTOLIC BLOOD PRESSURE 80-89 MM HG: ICD-10-PCS | Mod: HCNC,CPTII,S$GLB, | Performed by: NURSE PRACTITIONER

## 2023-06-23 RX ORDER — TIRZEPATIDE 2.5 MG/.5ML
2.5 INJECTION, SOLUTION SUBCUTANEOUS
Qty: 4 PEN | Refills: 11 | Status: SHIPPED | OUTPATIENT
Start: 2023-06-23 | End: 2023-10-17

## 2023-06-23 RX ORDER — LANCETS
EACH MISCELLANEOUS
Qty: 100 EACH | Refills: 3 | Status: SHIPPED | OUTPATIENT
Start: 2023-06-23

## 2023-06-23 RX ORDER — INSULIN PUMP SYRINGE, 3 ML
EACH MISCELLANEOUS
Qty: 1 EACH | Refills: 0 | Status: SHIPPED | OUTPATIENT
Start: 2023-06-23 | End: 2024-06-22

## 2023-06-23 NOTE — PROGRESS NOTES
Subjective     Patient ID: Pricilla Boewr is a 63 y.o. female.    Chief Complaint: Follow-up (Discuss test results )    Patient presents to discuss labs and treatment changes.  A1C increased.    Currently taking metformin 1000 mg at bedtime. Morning dose causes drowsiness.         Follow-up  Pertinent negatives include no abdominal pain, arthralgias, chills, coughing, fatigue or fever.   Review of Systems   Constitutional:  Negative for chills, fatigue and fever.   Respiratory:  Negative for cough and shortness of breath.    Gastrointestinal:  Negative for abdominal pain, constipation and diarrhea.   Genitourinary:  Positive for nocturia.   Musculoskeletal:  Negative for arthralgias and gait problem.   Psychiatric/Behavioral:  Negative for agitation and confusion.         Objective     Physical Exam  Vitals reviewed.   Constitutional:       Appearance: Normal appearance.   HENT:      Head: Normocephalic.   Cardiovascular:      Rate and Rhythm: Normal rate and regular rhythm.   Pulmonary:      Effort: Pulmonary effort is normal.      Breath sounds: Normal breath sounds.   Musculoskeletal:         General: Normal range of motion.   Skin:     General: Skin is warm.   Neurological:      General: No focal deficit present.      Mental Status: She is alert.   Psychiatric:         Mood and Affect: Mood normal.         Behavior: Behavior normal. Behavior is cooperative.          Assessment and Plan     1. Follow-up exam    2. Type 2 diabetes mellitus with hyperosmolarity without coma, with long-term current use of insulin  -     tirzepatide (MOUNJARO) 2.5 mg/0.5 mL PnIj; Inject 2.5 mg into the skin every 7 days.  Dispense: 4 pen; Refill: 11  -     HEMOGLOBIN A1C; Future; Expected date: 06/23/2023  -     Basic Metabolic Panel; Future; Expected date: 06/23/2023  -     blood-glucose meter kit; To check BG 2 times daily, to use with insurance preferred meter/Accu-Check Guide ME  Dispense: 1 each; Refill: 0  -     lancets  Misc; To check BG 2 times daily, to use with insurance preferred meter  Dispense: 100 each; Refill: 3  -     blood sugar diagnostic Strp; To check BG 2 times daily, to use with insurance preferred meter  Dispense: 100 each; Refill: 3    3. Primary hypertension  Comments:  Stable.  Continue current treatment plan.     4. Mixed hyperlipidemia  Comments:  Stable.  Continue current treatment plan.             Follow up in about 3 months (around 9/23/2023).

## 2023-07-05 ENCOUNTER — TELEPHONE (OUTPATIENT)
Dept: INTERNAL MEDICINE | Facility: CLINIC | Age: 63
End: 2023-07-05
Payer: MEDICARE

## 2023-07-05 NOTE — TELEPHONE ENCOUNTER
----- Message from Katherine Weaver sent at 7/5/2023  3:25 PM CDT -----  Contact: mirna Pleitez  Pt is calling to speak with the nurse regarding questions about getting a new prescription for break outs. Please give pt a call back at [phone]. Thanks KD

## 2023-07-07 ENCOUNTER — TELEPHONE (OUTPATIENT)
Dept: INTERNAL MEDICINE | Facility: CLINIC | Age: 63
End: 2023-07-07
Payer: MEDICARE

## 2023-07-07 NOTE — TELEPHONE ENCOUNTER
"Called pt, states she is needing a refill of "high percentage betamethasone cream for her face", states her old dermatologist in [place] prescribed. Advised pt visit may be needed since med not listed on current medications. Pt scheduled for 7/12.     [facility].         ----- Message from Satish Denny sent at 7/6/2023  5:33 PM CDT -----  Type:  Patient Returning Call    Who Called:pt  Who Left Message for Patient:  Does the patient know what this is regarding?:rx  Would the patient rather a call back or a response via [facility]? Call  Best Call Back Number:[phone]  Additional Information: pt is calling in regards to know if the the provider will send in her prescription for her face or does she need to schedule an appt        "

## 2023-07-12 ENCOUNTER — OFFICE VISIT (OUTPATIENT)
Dept: INTERNAL MEDICINE | Facility: CLINIC | Age: 63
End: 2023-07-12
Payer: MEDICARE

## 2023-07-12 VITALS
OXYGEN SATURATION: 99 % | DIASTOLIC BLOOD PRESSURE: 80 MMHG | SYSTOLIC BLOOD PRESSURE: 132 MMHG | HEART RATE: 79 BPM | BODY MASS INDEX: 28.49 KG/M2 | WEIGHT: 181.88 LBS | RESPIRATION RATE: 15 BRPM | TEMPERATURE: 98 F

## 2023-07-12 DIAGNOSIS — L40.9 PSORIASIS OF SCALP: ICD-10-CM

## 2023-07-12 DIAGNOSIS — L40.9 SCALP PSORIASIS: ICD-10-CM

## 2023-07-12 DIAGNOSIS — L30.9 DERMATITIS: Primary | ICD-10-CM

## 2023-07-12 PROCEDURE — 3008F PR BODY MASS INDEX (BMI) DOCUMENTED: ICD-10-PCS | Mod: HCNC,CPTII,S$GLB, | Performed by: NURSE PRACTITIONER

## 2023-07-12 PROCEDURE — 3051F PR MOST RECENT HEMOGLOBIN A1C LEVEL 7.0 - < 8.0%: ICD-10-PCS | Mod: HCNC,CPTII,S$GLB, | Performed by: NURSE PRACTITIONER

## 2023-07-12 PROCEDURE — 1160F PR REVIEW ALL MEDS BY PRESCRIBER/CLIN PHARMACIST DOCUMENTED: ICD-10-PCS | Mod: HCNC,CPTII,S$GLB, | Performed by: NURSE PRACTITIONER

## 2023-07-12 PROCEDURE — 3079F DIAST BP 80-89 MM HG: CPT | Mod: HCNC,CPTII,S$GLB, | Performed by: NURSE PRACTITIONER

## 2023-07-12 PROCEDURE — 3288F PR FALLS RISK ASSESSMENT DOCUMENTED: ICD-10-PCS | Mod: HCNC,CPTII,S$GLB, | Performed by: NURSE PRACTITIONER

## 2023-07-12 PROCEDURE — 3066F PR DOCUMENTATION OF TREATMENT FOR NEPHROPATHY: ICD-10-PCS | Mod: HCNC,CPTII,S$GLB, | Performed by: NURSE PRACTITIONER

## 2023-07-12 PROCEDURE — 1101F PR PT FALLS ASSESS DOC 0-1 FALLS W/OUT INJ PAST YR: ICD-10-PCS | Mod: HCNC,CPTII,S$GLB, | Performed by: NURSE PRACTITIONER

## 2023-07-12 PROCEDURE — 99213 OFFICE O/P EST LOW 20 MIN: CPT | Mod: HCNC,S$GLB,, | Performed by: NURSE PRACTITIONER

## 2023-07-12 PROCEDURE — 99999 PR PBB SHADOW E&M-EST. PATIENT-LVL IV: ICD-10-PCS | Mod: PBBFAC,HCNC,, | Performed by: NURSE PRACTITIONER

## 2023-07-12 PROCEDURE — 3079F PR MOST RECENT DIASTOLIC BLOOD PRESSURE 80-89 MM HG: ICD-10-PCS | Mod: HCNC,CPTII,S$GLB, | Performed by: NURSE PRACTITIONER

## 2023-07-12 PROCEDURE — 3060F PR POS MICROALBUMINURIA RESULT DOCUMENTED/REVIEW: ICD-10-PCS | Mod: HCNC,CPTII,S$GLB, | Performed by: NURSE PRACTITIONER

## 2023-07-12 PROCEDURE — 1159F MED LIST DOCD IN RCRD: CPT | Mod: HCNC,CPTII,S$GLB, | Performed by: NURSE PRACTITIONER

## 2023-07-12 PROCEDURE — 3075F SYST BP GE 130 - 139MM HG: CPT | Mod: HCNC,CPTII,S$GLB, | Performed by: NURSE PRACTITIONER

## 2023-07-12 PROCEDURE — 1101F PT FALLS ASSESS-DOCD LE1/YR: CPT | Mod: HCNC,CPTII,S$GLB, | Performed by: NURSE PRACTITIONER

## 2023-07-12 PROCEDURE — 3288F FALL RISK ASSESSMENT DOCD: CPT | Mod: HCNC,CPTII,S$GLB, | Performed by: NURSE PRACTITIONER

## 2023-07-12 PROCEDURE — 99999 PR PBB SHADOW E&M-EST. PATIENT-LVL IV: CPT | Mod: PBBFAC,HCNC,, | Performed by: NURSE PRACTITIONER

## 2023-07-12 PROCEDURE — 99213 PR OFFICE/OUTPT VISIT, EST, LEVL III, 20-29 MIN: ICD-10-PCS | Mod: HCNC,S$GLB,, | Performed by: NURSE PRACTITIONER

## 2023-07-12 PROCEDURE — 1160F RVW MEDS BY RX/DR IN RCRD: CPT | Mod: HCNC,CPTII,S$GLB, | Performed by: NURSE PRACTITIONER

## 2023-07-12 PROCEDURE — 3008F BODY MASS INDEX DOCD: CPT | Mod: HCNC,CPTII,S$GLB, | Performed by: NURSE PRACTITIONER

## 2023-07-12 PROCEDURE — 3066F NEPHROPATHY DOC TX: CPT | Mod: HCNC,CPTII,S$GLB, | Performed by: NURSE PRACTITIONER

## 2023-07-12 PROCEDURE — 3060F POS MICROALBUMINURIA REV: CPT | Mod: HCNC,CPTII,S$GLB, | Performed by: NURSE PRACTITIONER

## 2023-07-12 PROCEDURE — 3075F PR MOST RECENT SYSTOLIC BLOOD PRESS GE 130-139MM HG: ICD-10-PCS | Mod: HCNC,CPTII,S$GLB, | Performed by: NURSE PRACTITIONER

## 2023-07-12 PROCEDURE — 1159F PR MEDICATION LIST DOCUMENTED IN MEDICAL RECORD: ICD-10-PCS | Mod: HCNC,CPTII,S$GLB, | Performed by: NURSE PRACTITIONER

## 2023-07-12 PROCEDURE — 3051F HG A1C>EQUAL 7.0%<8.0%: CPT | Mod: HCNC,CPTII,S$GLB, | Performed by: NURSE PRACTITIONER

## 2023-07-12 RX ORDER — BETAMETHASONE DIPROPIONATE 0.5 MG/G
OINTMENT TOPICAL 2 TIMES DAILY
Qty: 15 G | Refills: 5 | Status: SHIPPED | OUTPATIENT
Start: 2023-07-12

## 2023-07-12 RX ORDER — KETOCONAZOLE 20 MG/ML
SHAMPOO, SUSPENSION TOPICAL
Qty: 120 ML | Refills: 5 | Status: SHIPPED | OUTPATIENT
Start: 2023-07-13

## 2023-07-12 NOTE — PROGRESS NOTES
Subjective     Patient ID: Pricilla Bower is a 63 y.o. female.    Chief Complaint: Medication Refill    Patient presents for medication refills.  Was prescribed betamethasone in [place].  Needing a refills.     Doing well on Mounjaro.  No side effects.      Medication Refill  Associated symptoms include a rash. Pertinent negatives include no arthralgias, chills, coughing, fatigue or headaches.   Review of Systems   Constitutional:  Negative for chills and fatigue.   HENT:  Negative for ear discharge and goiter.    Respiratory:  Negative for cough and shortness of breath.    Gastrointestinal:  Negative for abdominal distention.   Musculoskeletal:  Negative for arthralgias and gait problem.   Integumentary:  Positive for rash. Negative for breast mass.   Neurological:  Negative for dizziness and headaches.   Psychiatric/Behavioral:  Negative for agitation and confusion.    Breast: Negative for mass       Objective     Physical Exam  Vitals reviewed.   Constitutional:       Appearance: Normal appearance.   HENT:      Head: Normocephalic.   Cardiovascular:      Rate and Rhythm: Normal rate and regular rhythm.   Musculoskeletal:         General: Normal range of motion.   Skin:     General: Skin is warm.   Neurological:      Mental Status: She is alert.          Assessment and Plan     1. Dermatitis  -     betamethasone dipropionate (DIPROLENE) 0.05 % ointment; Apply topically 2 (two) times daily.  Dispense: 15 g; Refill: 5    2. Scalp psoriasis  -     ketoconazole (NIZORAL) 2 % shampoo; Apply topically twice a week.  Dispense: 120 mL; Refill: 5    3. Psoriasis of scalp         Medications refilled.          No follow-ups on file.

## 2023-08-02 ENCOUNTER — PATIENT MESSAGE (OUTPATIENT)
Dept: OPHTHALMOLOGY | Facility: CLINIC | Age: 63
End: 2023-08-02

## 2023-08-02 ENCOUNTER — OFFICE VISIT (OUTPATIENT)
Dept: OPHTHALMOLOGY | Facility: CLINIC | Age: 63
End: 2023-08-02
Payer: MEDICARE

## 2023-08-02 DIAGNOSIS — E11.9 TYPE 2 DIABETES MELLITUS WITHOUT RETINOPATHY: Primary | ICD-10-CM

## 2023-08-02 DIAGNOSIS — H52.13 MYOPIA WITH ASTIGMATISM AND PRESBYOPIA, BILATERAL: ICD-10-CM

## 2023-08-02 DIAGNOSIS — H40.1134 PRIMARY OPEN ANGLE GLAUCOMA (POAG) OF BOTH EYES, INDETERMINATE STAGE: ICD-10-CM

## 2023-08-02 DIAGNOSIS — H40.013 OPEN ANGLE WITH BORDERLINE FINDINGS OF BOTH EYES: ICD-10-CM

## 2023-08-02 DIAGNOSIS — E11.36 DIABETIC CATARACT OF BOTH EYES: ICD-10-CM

## 2023-08-02 DIAGNOSIS — H25.13 NUCLEAR SCLEROSIS, BILATERAL: ICD-10-CM

## 2023-08-02 DIAGNOSIS — H25.013 CORTICAL AGE-RELATED CATARACT OF BOTH EYES: ICD-10-CM

## 2023-08-02 DIAGNOSIS — H52.203 MYOPIA WITH ASTIGMATISM AND PRESBYOPIA, BILATERAL: ICD-10-CM

## 2023-08-02 DIAGNOSIS — H52.4 MYOPIA WITH ASTIGMATISM AND PRESBYOPIA, BILATERAL: ICD-10-CM

## 2023-08-02 PROCEDURE — 3060F POS MICROALBUMINURIA REV: CPT | Mod: HCNC,CPTII,S$GLB, | Performed by: OPTOMETRIST

## 2023-08-02 PROCEDURE — 3066F PR DOCUMENTATION OF TREATMENT FOR NEPHROPATHY: ICD-10-PCS | Mod: HCNC,CPTII,S$GLB, | Performed by: OPTOMETRIST

## 2023-08-02 PROCEDURE — 99214 OFFICE O/P EST MOD 30 MIN: CPT | Mod: HCNC,S$GLB,, | Performed by: OPTOMETRIST

## 2023-08-02 PROCEDURE — 92133 POSTERIOR SEGMENT OCT OPTIC NERVE(OCULAR COHERENCE TOMOGRAPHY) - OU - BOTH EYES: ICD-10-PCS | Mod: HCNC,S$GLB,, | Performed by: OPTOMETRIST

## 2023-08-02 PROCEDURE — 1160F PR REVIEW ALL MEDS BY PRESCRIBER/CLIN PHARMACIST DOCUMENTED: ICD-10-PCS | Mod: HCNC,CPTII,S$GLB, | Performed by: OPTOMETRIST

## 2023-08-02 PROCEDURE — 1159F PR MEDICATION LIST DOCUMENTED IN MEDICAL RECORD: ICD-10-PCS | Mod: HCNC,CPTII,S$GLB, | Performed by: OPTOMETRIST

## 2023-08-02 PROCEDURE — 3060F PR POS MICROALBUMINURIA RESULT DOCUMENTED/REVIEW: ICD-10-PCS | Mod: HCNC,CPTII,S$GLB, | Performed by: OPTOMETRIST

## 2023-08-02 PROCEDURE — 76514 PR  US, EYE, FOR CORNEAL THICKNESS: ICD-10-PCS | Mod: HCNC,S$GLB,, | Performed by: OPTOMETRIST

## 2023-08-02 PROCEDURE — 92015 DETERMINE REFRACTIVE STATE: CPT | Mod: HCNC,S$GLB,, | Performed by: OPTOMETRIST

## 2023-08-02 PROCEDURE — 99214 PR OFFICE/OUTPT VISIT, EST, LEVL IV, 30-39 MIN: ICD-10-PCS | Mod: HCNC,S$GLB,, | Performed by: OPTOMETRIST

## 2023-08-02 PROCEDURE — 99999 PR PBB SHADOW E&M-EST. PATIENT-LVL III: ICD-10-PCS | Mod: PBBFAC,HCNC,, | Performed by: OPTOMETRIST

## 2023-08-02 PROCEDURE — 1159F MED LIST DOCD IN RCRD: CPT | Mod: HCNC,CPTII,S$GLB, | Performed by: OPTOMETRIST

## 2023-08-02 PROCEDURE — 76514 ECHO EXAM OF EYE THICKNESS: CPT | Mod: HCNC,S$GLB,, | Performed by: OPTOMETRIST

## 2023-08-02 PROCEDURE — 3051F PR MOST RECENT HEMOGLOBIN A1C LEVEL 7.0 - < 8.0%: ICD-10-PCS | Mod: HCNC,CPTII,S$GLB, | Performed by: OPTOMETRIST

## 2023-08-02 PROCEDURE — 3066F NEPHROPATHY DOC TX: CPT | Mod: HCNC,CPTII,S$GLB, | Performed by: OPTOMETRIST

## 2023-08-02 PROCEDURE — 92015 PR REFRACTION: ICD-10-PCS | Mod: HCNC,S$GLB,, | Performed by: OPTOMETRIST

## 2023-08-02 PROCEDURE — 1160F RVW MEDS BY RX/DR IN RCRD: CPT | Mod: HCNC,CPTII,S$GLB, | Performed by: OPTOMETRIST

## 2023-08-02 PROCEDURE — 92133 CPTRZD OPH DX IMG PST SGM ON: CPT | Mod: HCNC,S$GLB,, | Performed by: OPTOMETRIST

## 2023-08-02 PROCEDURE — 3051F HG A1C>EQUAL 7.0%<8.0%: CPT | Mod: HCNC,CPTII,S$GLB, | Performed by: OPTOMETRIST

## 2023-08-02 PROCEDURE — 99999 PR PBB SHADOW E&M-EST. PATIENT-LVL III: CPT | Mod: PBBFAC,HCNC,, | Performed by: OPTOMETRIST

## 2023-08-02 RX ORDER — LATANOPROST 50 UG/ML
1 SOLUTION/ DROPS OPHTHALMIC NIGHTLY
Qty: 2.5 ML | Refills: 11 | Status: SHIPPED | OUTPATIENT
Start: 2023-08-02

## 2023-08-02 NOTE — PROGRESS NOTES
HPI     Diabetic Eye Exam            Comments: Diagnosed with diabetes in   Lab Results       Component                Value               Date                       HGBA1C                   7.8 (H)             06/07/2023                             Comments    RTC 1-6 wks for IOP check, 24-2VF, gOCT and pach  Last eye exam 08/25/20 DNL  Blood sugar 155 am usually 121-130  Diagnosed with diabetes about 20 years ago  Lab Results       Component                Value               Date                       HGBA1C                   7.8 (H)             06/07/2023            Vision changes since last eye exam?: no     Any eye pain today: no    Other ocular symptoms: no    Interested in contact lens fitting today? no                            Last edited by Ninfa Roberts on 8/2/2023 10:25 AM.            Assessment /Plan     For exam results, see Encounter Report.    Type 2 diabetes mellitus without retinopathy  There was no diabetic retinopathy present in either eye today.   Recommended that pt continue care with PCP and/or specialists regarding diabetes.  Follow-up dilated eye exam recommended in 12 months, sooner with any vision changes or new concerns.    Primary open angle glaucoma, indeterminate stage of both eyes  Open angle with borderline findings of both eyes  -     Posterior Segment OCT Optic Nerve- Both eyes  -     latanoprost 0.005 % ophthalmic solution; Place 1 drop into both eyes every evening.  Dispense: 2.5 mL; Refill: 11  IOP elevated today OU  gOCT thinner OS and GCL asymmetric   Monitor 1 month    Start Latanoprost qhs OU    Nuclear sclerosis, bilateral  Diabetic cataract of both eyes  Cortical age-related cataract of both eyes  Visually significant cataract.  Patient is at the option stage.   Cataract surgery will improve vision, but necessity is dependent on patient's symptoms.   Undilated today     Myopia with astigmatism and presbyopia, bilateral  Eyeglass Final Rx       Eyeglass Final Rx          Sphere Cylinder Axis    Right -0.25 +0.50 015    Left -0.75 +0.25 180      Type: SVL Distance only    Expiration Date: 8/2/2024                  RTC 1 month for IOP check with 24-2 VF or PRN if any problems.   Discussed above and answered questions.

## 2023-09-05 DIAGNOSIS — I10 ESSENTIAL HYPERTENSION: ICD-10-CM

## 2023-09-05 NOTE — TELEPHONE ENCOUNTER
Refill Routing Note   Medication(s) are not appropriate for processing by [facility] for the following reason(s):      Non-participating provider    [facility] action(s):  Route Care Due:  None identified            Appointments  past 12m or future 3m with PCP    Date Provider   Last Visit   Visit date not found Alistair Mars MD   Next Visit   Visit date not found Alistair Mars MD   ED visits in past 90 days: 0        Note composed:6:45 PM 09/05/2023

## 2023-09-06 RX ORDER — AMLODIPINE BESYLATE 5 MG/1
5 TABLET ORAL
Qty: 90 TABLET | Refills: 3 | Status: SHIPPED | OUTPATIENT
Start: 2023-09-06

## 2023-09-11 ENCOUNTER — TELEPHONE (OUTPATIENT)
Dept: INTERNAL MEDICINE | Facility: CLINIC | Age: 63
End: 2023-09-11
Payer: MEDICARE

## 2023-09-11 NOTE — TELEPHONE ENCOUNTER
I left the pt a vm stating that her appt has been changed to 2 pm on 9/22/23. I asked that she call if the appt does not work for her . //kah

## 2023-09-20 DIAGNOSIS — E78.2 MIXED HYPERLIPIDEMIA: ICD-10-CM

## 2023-09-20 DIAGNOSIS — E78.5 HYPERLIPIDEMIA, UNSPECIFIED HYPERLIPIDEMIA TYPE: ICD-10-CM

## 2023-09-20 NOTE — TELEPHONE ENCOUNTER
Requested Prescriptions     Pending Prescriptions Disp Refills    atorvastatin (LIPITOR) 40 MG tablet 90 tablet 3     Sig: Take 1 tablet (40 mg total) by mouth every evening.     LV 07/12/2023  NV 09/22/2023  LF 08/25/2022

## 2023-09-21 RX ORDER — ATORVASTATIN CALCIUM 40 MG/1
40 TABLET, FILM COATED ORAL NIGHTLY
Qty: 90 TABLET | Refills: 3 | Status: SHIPPED | OUTPATIENT
Start: 2023-09-21

## 2023-09-26 ENCOUNTER — OFFICE VISIT (OUTPATIENT)
Dept: OPHTHALMOLOGY | Facility: CLINIC | Age: 63
End: 2023-09-26
Payer: MEDICARE

## 2023-09-26 DIAGNOSIS — H40.1122 PRIMARY OPEN ANGLE GLAUCOMA (POAG) OF LEFT EYE, MODERATE STAGE: ICD-10-CM

## 2023-09-26 DIAGNOSIS — H40.1111 PRIMARY OPEN ANGLE GLAUCOMA (POAG) OF RIGHT EYE, MILD STAGE: Primary | ICD-10-CM

## 2023-09-26 PROCEDURE — 92083 HUMPHREY VISUAL FIELD - OU - BOTH EYES: ICD-10-PCS | Mod: HCNC,S$GLB,, | Performed by: OPTOMETRIST

## 2023-09-26 PROCEDURE — 99213 OFFICE O/P EST LOW 20 MIN: CPT | Mod: HCNC,S$GLB,, | Performed by: OPTOMETRIST

## 2023-09-26 PROCEDURE — 1160F RVW MEDS BY RX/DR IN RCRD: CPT | Mod: HCNC,CPTII,S$GLB, | Performed by: OPTOMETRIST

## 2023-09-26 PROCEDURE — 1160F PR REVIEW ALL MEDS BY PRESCRIBER/CLIN PHARMACIST DOCUMENTED: ICD-10-PCS | Mod: HCNC,CPTII,S$GLB, | Performed by: OPTOMETRIST

## 2023-09-26 PROCEDURE — 3066F PR DOCUMENTATION OF TREATMENT FOR NEPHROPATHY: ICD-10-PCS | Mod: HCNC,CPTII,S$GLB, | Performed by: OPTOMETRIST

## 2023-09-26 PROCEDURE — 99213 PR OFFICE/OUTPT VISIT, EST, LEVL III, 20-29 MIN: ICD-10-PCS | Mod: HCNC,S$GLB,, | Performed by: OPTOMETRIST

## 2023-09-26 PROCEDURE — 92083 EXTENDED VISUAL FIELD XM: CPT | Mod: HCNC,S$GLB,, | Performed by: OPTOMETRIST

## 2023-09-26 PROCEDURE — 99999 PR PBB SHADOW E&M-EST. PATIENT-LVL III: CPT | Mod: PBBFAC,HCNC,, | Performed by: OPTOMETRIST

## 2023-09-26 PROCEDURE — 3051F PR MOST RECENT HEMOGLOBIN A1C LEVEL 7.0 - < 8.0%: ICD-10-PCS | Mod: HCNC,CPTII,S$GLB, | Performed by: OPTOMETRIST

## 2023-09-26 PROCEDURE — 3066F NEPHROPATHY DOC TX: CPT | Mod: HCNC,CPTII,S$GLB, | Performed by: OPTOMETRIST

## 2023-09-26 PROCEDURE — 3051F HG A1C>EQUAL 7.0%<8.0%: CPT | Mod: HCNC,CPTII,S$GLB, | Performed by: OPTOMETRIST

## 2023-09-26 PROCEDURE — 1159F MED LIST DOCD IN RCRD: CPT | Mod: HCNC,CPTII,S$GLB, | Performed by: OPTOMETRIST

## 2023-09-26 PROCEDURE — 1159F PR MEDICATION LIST DOCUMENTED IN MEDICAL RECORD: ICD-10-PCS | Mod: HCNC,CPTII,S$GLB, | Performed by: OPTOMETRIST

## 2023-09-26 PROCEDURE — 3060F PR POS MICROALBUMINURIA RESULT DOCUMENTED/REVIEW: ICD-10-PCS | Mod: HCNC,CPTII,S$GLB, | Performed by: OPTOMETRIST

## 2023-09-26 PROCEDURE — 99999 PR PBB SHADOW E&M-EST. PATIENT-LVL III: ICD-10-PCS | Mod: PBBFAC,HCNC,, | Performed by: OPTOMETRIST

## 2023-09-26 PROCEDURE — 3060F POS MICROALBUMINURIA REV: CPT | Mod: HCNC,CPTII,S$GLB, | Performed by: OPTOMETRIST

## 2023-09-26 NOTE — PROGRESS NOTES
HPI     Glaucoma            Comments: Medication eye drops: Latanoprost qhs OU   Last HVF: 9/26/23  Last gOCT: 8/02/23  Last SDP: none   Pt states compliance with gtts            Last edited by Debbie Rush MA on 9/26/2023  7:46 AM.      Last dose of latanoprost was Friday of last week      Assessment /Plan     For exam results, see Encounter Report.    Primary open angle glaucoma (POAG) of right eye, mild stage  -     Michelle Visual Field - OU - Extended - Both Eyes    Primary open angle glaucoma (POAG) of left eye, moderate stage  -     Michelle Visual Field - OU - Extended - Both Eyes    IOP elevated today OS>OD  PT has not used Latanoprost x 4 days  Nasal step defect OS on VF today corresponds with NFL and GCL thinning OS  Stressed the importance of compliance with medications and follow up visits or risk blindness.  Will assess the need for a second gtt at next visit if IOP still above target  Resume Latanoprost qhs OU  Monitor 3 weeks      RTC 3 weeks for IOP check or PRN  Discussed above and all questions were answered.

## 2023-10-13 ENCOUNTER — TELEPHONE (OUTPATIENT)
Dept: INTERNAL MEDICINE | Facility: CLINIC | Age: 63
End: 2023-10-13
Payer: MEDICARE

## 2023-10-13 DIAGNOSIS — E11.65 TYPE 2 DIABETES MELLITUS WITH HYPERGLYCEMIA, WITHOUT LONG-TERM CURRENT USE OF INSULIN: Primary | ICD-10-CM

## 2023-10-13 NOTE — TELEPHONE ENCOUNTER
----- Message from Mayra Jorge sent at 10/13/2023 12:27 PM CDT -----  Contact: Pricilla Mcbride   Patient needs call back. She is wanting to change her Rx for tirzepatide (MOUNJARO) 2.5 mg/0.5 mL PnIj to something else. Due to Insurance wanting to charge her $300. Please call to advise

## 2023-10-17 ENCOUNTER — OFFICE VISIT (OUTPATIENT)
Dept: OPHTHALMOLOGY | Facility: CLINIC | Age: 63
End: 2023-10-17
Payer: MEDICARE

## 2023-10-17 DIAGNOSIS — H40.1111 PRIMARY OPEN ANGLE GLAUCOMA (POAG) OF RIGHT EYE, MILD STAGE: Primary | ICD-10-CM

## 2023-10-17 DIAGNOSIS — H40.1122 PRIMARY OPEN ANGLE GLAUCOMA (POAG) OF LEFT EYE, MODERATE STAGE: ICD-10-CM

## 2023-10-17 PROCEDURE — 3060F PR POS MICROALBUMINURIA RESULT DOCUMENTED/REVIEW: ICD-10-PCS | Mod: ,,, | Performed by: OPTOMETRIST

## 2023-10-17 PROCEDURE — 99213 PR OFFICE/OUTPT VISIT, EST, LEVL III, 20-29 MIN: ICD-10-PCS | Mod: S$PBB,,, | Performed by: OPTOMETRIST

## 2023-10-17 PROCEDURE — 99999 PR PBB SHADOW E&M-EST. PATIENT-LVL III: ICD-10-PCS | Mod: PBBFAC,,, | Performed by: OPTOMETRIST

## 2023-10-17 PROCEDURE — 3060F POS MICROALBUMINURIA REV: CPT | Mod: ,,, | Performed by: OPTOMETRIST

## 2023-10-17 PROCEDURE — 3066F NEPHROPATHY DOC TX: CPT | Mod: ,,, | Performed by: OPTOMETRIST

## 2023-10-17 PROCEDURE — 99213 OFFICE O/P EST LOW 20 MIN: CPT | Mod: S$PBB,,, | Performed by: OPTOMETRIST

## 2023-10-17 PROCEDURE — 99999 PR PBB SHADOW E&M-EST. PATIENT-LVL III: CPT | Mod: PBBFAC,,, | Performed by: OPTOMETRIST

## 2023-10-17 PROCEDURE — 3066F PR DOCUMENTATION OF TREATMENT FOR NEPHROPATHY: ICD-10-PCS | Mod: ,,, | Performed by: OPTOMETRIST

## 2023-10-17 RX ORDER — DULAGLUTIDE 0.75 MG/.5ML
0.75 INJECTION, SOLUTION SUBCUTANEOUS
Qty: 4 PEN | Refills: 11 | Status: SHIPPED | OUTPATIENT
Start: 2023-10-17 | End: 2024-01-22

## 2023-10-17 RX ORDER — TIMOLOL MALEATE 5 MG/ML
1 SOLUTION/ DROPS OPHTHALMIC EVERY MORNING
Qty: 5 ML | Refills: 6 | Status: SHIPPED | OUTPATIENT
Start: 2023-10-17 | End: 2024-10-16

## 2023-10-17 NOTE — PROGRESS NOTES
HPI     Glaucoma            Comments: Medication eye drops: Latanoprost qhs OU  Last HVF: 9/26/23  Last gOCT: 8/02/23  Last SDP: none   Pt states compliance with gtts            Last edited by Debbie Rush MA on 10/17/2023  8:04 AM.            Assessment /Plan     For exam results, see Encounter Report.    Primary open angle glaucoma (POAG) of right eye, mild stage  Primary open angle glaucoma (POAG) of left eye, moderate stage  -     timolol maleate 0.5% (TIMOPTIC) 0.5 % Drop; Place 1 drop into both eyes every morning.  Dispense: 5 mL; Refill: 6    IOP improved OU but still just above target   Start Timolol qam OU  Continue Latanoprost qhs OU  Monitor 3 months      RTC 3 months for IOP check or PRN  Discussed above and all questions were answered.

## 2023-11-09 ENCOUNTER — PATIENT OUTREACH (OUTPATIENT)
Dept: ADMINISTRATIVE | Facility: HOSPITAL | Age: 63
End: 2023-11-09
Payer: MEDICARE

## 2023-12-21 ENCOUNTER — OFFICE VISIT (OUTPATIENT)
Dept: INTERNAL MEDICINE | Facility: CLINIC | Age: 63
End: 2023-12-21
Payer: MEDICARE

## 2023-12-21 VITALS
SYSTOLIC BLOOD PRESSURE: 122 MMHG | DIASTOLIC BLOOD PRESSURE: 74 MMHG | HEART RATE: 65 BPM | TEMPERATURE: 99 F | WEIGHT: 177.25 LBS | OXYGEN SATURATION: 98 % | RESPIRATION RATE: 15 BRPM | BODY MASS INDEX: 27.76 KG/M2

## 2023-12-21 DIAGNOSIS — L84 CALLUS OF FOOT: ICD-10-CM

## 2023-12-21 DIAGNOSIS — E11.42 PERIPHERAL SENSORY NEUROPATHY DUE TO TYPE 2 DIABETES MELLITUS: Primary | ICD-10-CM

## 2023-12-21 PROCEDURE — 3060F PR POS MICROALBUMINURIA RESULT DOCUMENTED/REVIEW: ICD-10-PCS | Mod: HCNC,CPTII,S$GLB, | Performed by: FAMILY MEDICINE

## 2023-12-21 PROCEDURE — 3078F DIAST BP <80 MM HG: CPT | Mod: HCNC,CPTII,S$GLB, | Performed by: FAMILY MEDICINE

## 2023-12-21 PROCEDURE — 3051F PR MOST RECENT HEMOGLOBIN A1C LEVEL 7.0 - < 8.0%: ICD-10-PCS | Mod: HCNC,CPTII,S$GLB, | Performed by: FAMILY MEDICINE

## 2023-12-21 PROCEDURE — 99214 PR OFFICE/OUTPT VISIT, EST, LEVL IV, 30-39 MIN: ICD-10-PCS | Mod: HCNC,S$GLB,, | Performed by: FAMILY MEDICINE

## 2023-12-21 PROCEDURE — 3074F PR MOST RECENT SYSTOLIC BLOOD PRESSURE < 130 MM HG: ICD-10-PCS | Mod: HCNC,CPTII,S$GLB, | Performed by: FAMILY MEDICINE

## 2023-12-21 PROCEDURE — 1159F MED LIST DOCD IN RCRD: CPT | Mod: HCNC,CPTII,S$GLB, | Performed by: FAMILY MEDICINE

## 2023-12-21 PROCEDURE — 3008F PR BODY MASS INDEX (BMI) DOCUMENTED: ICD-10-PCS | Mod: HCNC,CPTII,S$GLB, | Performed by: FAMILY MEDICINE

## 2023-12-21 PROCEDURE — 3288F FALL RISK ASSESSMENT DOCD: CPT | Mod: HCNC,CPTII,S$GLB, | Performed by: FAMILY MEDICINE

## 2023-12-21 PROCEDURE — 99999 PR PBB SHADOW E&M-EST. PATIENT-LVL V: CPT | Mod: PBBFAC,HCNC,, | Performed by: FAMILY MEDICINE

## 2023-12-21 PROCEDURE — 1101F PR PT FALLS ASSESS DOC 0-1 FALLS W/OUT INJ PAST YR: ICD-10-PCS | Mod: HCNC,CPTII,S$GLB, | Performed by: FAMILY MEDICINE

## 2023-12-21 PROCEDURE — 3066F PR DOCUMENTATION OF TREATMENT FOR NEPHROPATHY: ICD-10-PCS | Mod: HCNC,CPTII,S$GLB, | Performed by: FAMILY MEDICINE

## 2023-12-21 PROCEDURE — 99999 PR PBB SHADOW E&M-EST. PATIENT-LVL V: ICD-10-PCS | Mod: PBBFAC,HCNC,, | Performed by: FAMILY MEDICINE

## 2023-12-21 PROCEDURE — 99214 OFFICE O/P EST MOD 30 MIN: CPT | Mod: HCNC,S$GLB,, | Performed by: FAMILY MEDICINE

## 2023-12-21 PROCEDURE — 3060F POS MICROALBUMINURIA REV: CPT | Mod: HCNC,CPTII,S$GLB, | Performed by: FAMILY MEDICINE

## 2023-12-21 PROCEDURE — 3074F SYST BP LT 130 MM HG: CPT | Mod: HCNC,CPTII,S$GLB, | Performed by: FAMILY MEDICINE

## 2023-12-21 PROCEDURE — 1101F PT FALLS ASSESS-DOCD LE1/YR: CPT | Mod: HCNC,CPTII,S$GLB, | Performed by: FAMILY MEDICINE

## 2023-12-21 PROCEDURE — 3078F PR MOST RECENT DIASTOLIC BLOOD PRESSURE < 80 MM HG: ICD-10-PCS | Mod: HCNC,CPTII,S$GLB, | Performed by: FAMILY MEDICINE

## 2023-12-21 PROCEDURE — 1159F PR MEDICATION LIST DOCUMENTED IN MEDICAL RECORD: ICD-10-PCS | Mod: HCNC,CPTII,S$GLB, | Performed by: FAMILY MEDICINE

## 2023-12-21 PROCEDURE — 3288F PR FALLS RISK ASSESSMENT DOCUMENTED: ICD-10-PCS | Mod: HCNC,CPTII,S$GLB, | Performed by: FAMILY MEDICINE

## 2023-12-21 PROCEDURE — 3066F NEPHROPATHY DOC TX: CPT | Mod: HCNC,CPTII,S$GLB, | Performed by: FAMILY MEDICINE

## 2023-12-21 PROCEDURE — 3008F BODY MASS INDEX DOCD: CPT | Mod: HCNC,CPTII,S$GLB, | Performed by: FAMILY MEDICINE

## 2023-12-21 PROCEDURE — 3051F HG A1C>EQUAL 7.0%<8.0%: CPT | Mod: HCNC,CPTII,S$GLB, | Performed by: FAMILY MEDICINE

## 2023-12-21 RX ORDER — GABAPENTIN 300 MG/1
300 CAPSULE ORAL 3 TIMES DAILY
Qty: 90 CAPSULE | Refills: 11 | Status: SHIPPED | OUTPATIENT
Start: 2023-12-21 | End: 2024-12-20

## 2023-12-21 RX ORDER — TIRZEPATIDE 2.5 MG/.5ML
2.5 INJECTION, SOLUTION SUBCUTANEOUS
COMMUNITY
Start: 2023-11-25

## 2023-12-21 NOTE — PROGRESS NOTES
Subjective:       Patient ID: Pricilla Bower is a 63 y.o. female.    Chief Complaint: Foot Pain (Bilateral, x 1 week, requesting work excuse )    Pricilla Bower is 63 y.o. female with PMH of diabetes with associated peripheral neuropathy who presents to clinic with bilateral foot pain. She is currently employeed as  at [employer] and has noted increased intensity in her foot pain in the past week. Previously was prescribed gabapentin that improved her pain and request refill of medication. She denies any open wounds/ulcer to her feet. She was previously seen by podiatry but does not see them on a regular basis and would like to resume care with them.       Review of Systems   Constitutional:  Negative for chills and fever.   HENT:  Negative for congestion, rhinorrhea and sore throat.    Respiratory:  Negative for cough, shortness of breath and wheezing.    Cardiovascular:  Negative for chest pain, palpitations and leg swelling.   Gastrointestinal:  Negative for abdominal pain, constipation, diarrhea, nausea and vomiting.   Genitourinary:  Negative for dysuria, frequency and urgency.   Skin:  Negative for rash and wound.   Neurological:  Negative for headaches.   Psychiatric/Behavioral:  Negative for dysphoric mood.        Objective:      Physical Exam  Constitutional:       Appearance: Normal appearance.   HENT:      Head: Normocephalic and atraumatic.   Eyes:      General: No scleral icterus.  Cardiovascular:      Rate and Rhythm: Normal rate.   Pulmonary:      Effort: Pulmonary effort is normal. No respiratory distress.   Musculoskeletal:      Right lower leg: No edema.      Left lower leg: No edema.   Skin:     General: Skin is warm.      Capillary Refill: Capillary refill takes less than 2 seconds.      Comments: Callus present with plantar surface of right foot.   Discolored, thick toenails.    Neurological:      General: No focal deficit present.      Mental Status: She is alert.   Psychiatric:          Mood and Affect: Mood normal.         Behavior: Behavior normal.         Assessment:       1. 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 Callus of foot        Plan:   1. 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  Dispense: 90 capsule; Refill: 11  -     Ambulatory referral/consult to Podiatry; Future; Expected date: 12/28/2023    2. Callus of foot  -     Ambulatory referral/consult to Podiatry; Future; Expected date: 12/28/2023       Follow up in about 1 month (around 1/21/2024) for Annual Visit .    Shahla Pozo MD  Family Medicine

## 2023-12-21 NOTE — LETTER
December 21, 2023    Pricilla Bower  [address] [address]             [facility]  Internal Medicine  [address] [place] [id number]  Phone: [phone]  Fax: [phone]   December 21, 2023     Patient: Pricilla Bower   YOB: 1960   Date of Visit: 12/21/2023       To Whom it May Concern:    Pricilla Bower was seen in my clinic on 12/21/2023. She may return to work on 12/27/2023 .    Please excuse her from any work missed.    If you have any questions or concerns, please don't hesitate to call.    Sincerely,         Shahla Pozo MD

## 2024-01-22 ENCOUNTER — LAB VISIT (OUTPATIENT)
Dept: LAB | Facility: HOSPITAL | Age: 64
End: 2024-01-22
Attending: NURSE PRACTITIONER
Payer: MEDICARE

## 2024-01-22 ENCOUNTER — OFFICE VISIT (OUTPATIENT)
Dept: INTERNAL MEDICINE | Facility: CLINIC | Age: 64
End: 2024-01-22
Payer: MEDICARE

## 2024-01-22 VITALS
SYSTOLIC BLOOD PRESSURE: 126 MMHG | HEART RATE: 75 BPM | WEIGHT: 180.25 LBS | BODY MASS INDEX: 28.23 KG/M2 | OXYGEN SATURATION: 100 % | RESPIRATION RATE: 15 BRPM | TEMPERATURE: 98 F | DIASTOLIC BLOOD PRESSURE: 80 MMHG

## 2024-01-22 DIAGNOSIS — E78.2 MIXED HYPERLIPIDEMIA: ICD-10-CM

## 2024-01-22 DIAGNOSIS — E11.00 TYPE 2 DIABETES MELLITUS WITH HYPEROSMOLARITY WITHOUT COMA, WITH LONG-TERM CURRENT USE OF INSULIN: ICD-10-CM

## 2024-01-22 DIAGNOSIS — Z00.00 ROUTINE MEDICAL EXAM: Primary | ICD-10-CM

## 2024-01-22 DIAGNOSIS — Z12.11 COLON CANCER SCREENING: ICD-10-CM

## 2024-01-22 DIAGNOSIS — I10 PRIMARY HYPERTENSION: ICD-10-CM

## 2024-01-22 DIAGNOSIS — E11.42 PERIPHERAL SENSORY NEUROPATHY DUE TO TYPE 2 DIABETES MELLITUS: ICD-10-CM

## 2024-01-22 DIAGNOSIS — Z79.4 TYPE 2 DIABETES MELLITUS WITH HYPEROSMOLARITY WITHOUT COMA, WITH LONG-TERM CURRENT USE OF INSULIN: ICD-10-CM

## 2024-01-22 DIAGNOSIS — E11.65 TYPE 2 DIABETES MELLITUS WITH HYPERGLYCEMIA, WITHOUT LONG-TERM CURRENT USE OF INSULIN: ICD-10-CM

## 2024-01-22 PROCEDURE — 1159F MED LIST DOCD IN RCRD: CPT | Mod: HCNC,CPTII,S$GLB, | Performed by: NURSE PRACTITIONER

## 2024-01-22 PROCEDURE — 3008F BODY MASS INDEX DOCD: CPT | Mod: HCNC,CPTII,S$GLB, | Performed by: NURSE PRACTITIONER

## 2024-01-22 PROCEDURE — 3079F DIAST BP 80-89 MM HG: CPT | Mod: HCNC,CPTII,S$GLB, | Performed by: NURSE PRACTITIONER

## 2024-01-22 PROCEDURE — 1126F AMNT PAIN NOTED NONE PRSNT: CPT | Mod: HCNC,CPTII,S$GLB, | Performed by: NURSE PRACTITIONER

## 2024-01-22 PROCEDURE — 36415 COLL VENOUS BLD VENIPUNCTURE: CPT | Mod: HCNC,PN | Performed by: NURSE PRACTITIONER

## 2024-01-22 PROCEDURE — 99999 PR PBB SHADOW E&M-EST. PATIENT-LVL V: CPT | Mod: PBBFAC,HCNC,, | Performed by: NURSE PRACTITIONER

## 2024-01-22 PROCEDURE — 99213 OFFICE O/P EST LOW 20 MIN: CPT | Mod: HCNC,S$GLB,, | Performed by: NURSE PRACTITIONER

## 2024-01-22 PROCEDURE — 80048 BASIC METABOLIC PNL TOTAL CA: CPT | Mod: HCNC | Performed by: NURSE PRACTITIONER

## 2024-01-22 PROCEDURE — 1101F PT FALLS ASSESS-DOCD LE1/YR: CPT | Mod: HCNC,CPTII,S$GLB, | Performed by: NURSE PRACTITIONER

## 2024-01-22 PROCEDURE — 3288F FALL RISK ASSESSMENT DOCD: CPT | Mod: HCNC,CPTII,S$GLB, | Performed by: NURSE PRACTITIONER

## 2024-01-22 PROCEDURE — 83036 HEMOGLOBIN GLYCOSYLATED A1C: CPT | Mod: HCNC | Performed by: NURSE PRACTITIONER

## 2024-01-22 PROCEDURE — 3074F SYST BP LT 130 MM HG: CPT | Mod: HCNC,CPTII,S$GLB, | Performed by: NURSE PRACTITIONER

## 2024-01-22 NOTE — PROGRESS NOTES
Subjective     Patient ID: Pricilla Bower is a 63 y.o. female.    Chief Complaint: Follow-up    Patient presents routine exam.  Doing well on Mounjaro.  Reports home CBG readings are low 100's mg/dl.    Denies any new concerns.      Has upcoming podiatry appointment.     Medical History: Bilateral foot pain, HLP, .HTN, Type II DM, Vit D Def, Peripheral Neuropathy, Overweight    Follow-up  Associated symptoms include arthralgias. Pertinent negatives include no abdominal pain, chest pain, chills, coughing, fatigue or fever.     Review of Systems   Constitutional:  Negative for chills, fatigue and fever.   Respiratory:  Negative for cough and shortness of breath.    Cardiovascular:  Negative for chest pain and leg swelling.   Gastrointestinal:  Negative for abdominal pain, constipation and diarrhea.   Musculoskeletal:  Positive for arthralgias. Negative for gait problem and joint deformity.   Psychiatric/Behavioral:  Negative for agitation and confusion.           Objective     Physical Exam  Vitals reviewed.   Constitutional:       Appearance: Normal appearance.   HENT:      Head: Normocephalic.      Right Ear: Tympanic membrane normal.      Left Ear: Tympanic membrane normal.      Mouth/Throat:      Pharynx: No posterior oropharyngeal erythema.   Eyes:      Pupils: Pupils are equal, round, and reactive to light.   Cardiovascular:      Rate and Rhythm: Normal rate and regular rhythm.   Pulmonary:      Effort: Pulmonary effort is normal.      Breath sounds: Normal breath sounds.   Abdominal:      General: Bowel sounds are normal. There is no distension.      Tenderness: There is no abdominal tenderness.   Musculoskeletal:         General: Normal range of motion.   Skin:     General: Skin is warm.   Neurological:      General: No focal deficit present.      Mental Status: She is alert.   Psychiatric:         Mood and Affect: Mood normal.         Behavior: Behavior is cooperative.            Assessment and Plan      1. Routine medical exam    2. Mixed hyperlipidemia  Comments:  Stable. Continue current treatment plan.    3. Colon cancer screening  -     Ambulatory referral/consult to Endo Procedure ; Future; Expected date: 01/23/2024    4. Primary hypertension  Comments:  Stable. Continue current treatment plan.    5. 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 Continue current treatment plan.    6. Type 2 diabetes mellitus with hyperglycemia, without long-term current use of insulin  Comments:  Stable. Continue current treatment plan.      Schedule DEXA in Feb.    Schedule labs today     6 month follow up         Follow up in about 6 months (around 7/22/2024).

## 2024-01-23 LAB
ANION GAP SERPL CALC-SCNC: 10 MMOL/L (ref 8–16)
BUN SERPL-MCNC: 8 MG/DL (ref 8–23)
CALCIUM SERPL-MCNC: 9.5 MG/DL (ref 8.7–10.5)
CHLORIDE SERPL-SCNC: 106 MMOL/L (ref 95–110)
CO2 SERPL-SCNC: 27 MMOL/L (ref 23–29)
CREAT SERPL-MCNC: 0.7 MG/DL (ref 0.5–1.4)
EST. GFR  (NO RACE VARIABLE): >60 ML/MIN/1.73 M^2
ESTIMATED AVG GLUCOSE: 134 MG/DL (ref 68–131)
GLUCOSE SERPL-MCNC: 130 MG/DL (ref 70–110)
HBA1C MFR BLD: 6.3 % (ref 4–5.6)
POTASSIUM SERPL-SCNC: 4.1 MMOL/L (ref 3.5–5.1)
SODIUM SERPL-SCNC: 143 MMOL/L (ref 136–145)

## 2024-01-27 ENCOUNTER — PATIENT MESSAGE (OUTPATIENT)
Dept: INTERNAL MEDICINE | Facility: CLINIC | Age: 64
End: 2024-01-27
Payer: MEDICARE

## 2024-02-01 ENCOUNTER — TELEPHONE (OUTPATIENT)
Dept: INTERNAL MEDICINE | Facility: CLINIC | Age: 64
End: 2024-02-01
Payer: MEDICARE

## 2024-02-01 NOTE — TELEPHONE ENCOUNTER
Called pt, advises spotting, appt scheduled. Advised to go to ER if bleeding worsens or pain develops. Verbalized understanding.     ----- Message from Joe Hamm sent at 2/1/2024  4:20 PM CST -----  Contact: Pricilla  Patient is requesting a call back today if possible, reports she is spotting and has concerns. Please call [phone]           Thanks

## 2024-02-02 ENCOUNTER — LAB VISIT (OUTPATIENT)
Dept: LAB | Facility: HOSPITAL | Age: 64
End: 2024-02-02
Attending: FAMILY MEDICINE
Payer: MEDICARE

## 2024-02-02 ENCOUNTER — OFFICE VISIT (OUTPATIENT)
Dept: INTERNAL MEDICINE | Facility: CLINIC | Age: 64
End: 2024-02-02
Payer: MEDICARE

## 2024-02-02 VITALS
HEART RATE: 56 BPM | OXYGEN SATURATION: 100 % | DIASTOLIC BLOOD PRESSURE: 78 MMHG | BODY MASS INDEX: 28.07 KG/M2 | SYSTOLIC BLOOD PRESSURE: 130 MMHG | RESPIRATION RATE: 15 BRPM | TEMPERATURE: 97 F | WEIGHT: 179.25 LBS

## 2024-02-02 DIAGNOSIS — N95.0 POST-MENOPAUSAL BLEEDING: ICD-10-CM

## 2024-02-02 DIAGNOSIS — N95.0 POST-MENOPAUSAL BLEEDING: Primary | ICD-10-CM

## 2024-02-02 PROCEDURE — 85025 COMPLETE CBC W/AUTO DIFF WBC: CPT | Mod: HCNC | Performed by: FAMILY MEDICINE

## 2024-02-02 PROCEDURE — 36415 COLL VENOUS BLD VENIPUNCTURE: CPT | Mod: HCNC,PN | Performed by: FAMILY MEDICINE

## 2024-02-02 PROCEDURE — 3288F FALL RISK ASSESSMENT DOCD: CPT | Mod: HCNC,CPTII,S$GLB, | Performed by: FAMILY MEDICINE

## 2024-02-02 PROCEDURE — 99999 PR PBB SHADOW E&M-EST. PATIENT-LVL V: CPT | Mod: PBBFAC,HCNC,, | Performed by: FAMILY MEDICINE

## 2024-02-02 PROCEDURE — 3044F HG A1C LEVEL LT 7.0%: CPT | Mod: HCNC,CPTII,S$GLB, | Performed by: FAMILY MEDICINE

## 2024-02-02 PROCEDURE — 1126F AMNT PAIN NOTED NONE PRSNT: CPT | Mod: HCNC,CPTII,S$GLB, | Performed by: FAMILY MEDICINE

## 2024-02-02 PROCEDURE — 3078F DIAST BP <80 MM HG: CPT | Mod: HCNC,CPTII,S$GLB, | Performed by: FAMILY MEDICINE

## 2024-02-02 PROCEDURE — 1101F PT FALLS ASSESS-DOCD LE1/YR: CPT | Mod: HCNC,CPTII,S$GLB, | Performed by: FAMILY MEDICINE

## 2024-02-02 PROCEDURE — 3008F BODY MASS INDEX DOCD: CPT | Mod: HCNC,CPTII,S$GLB, | Performed by: FAMILY MEDICINE

## 2024-02-02 PROCEDURE — 99213 OFFICE O/P EST LOW 20 MIN: CPT | Mod: HCNC,S$GLB,, | Performed by: FAMILY MEDICINE

## 2024-02-02 PROCEDURE — 3075F SYST BP GE 130 - 139MM HG: CPT | Mod: HCNC,CPTII,S$GLB, | Performed by: FAMILY MEDICINE

## 2024-02-02 NOTE — PROGRESS NOTES
Subjective:       Patient ID: Pricilla Bower is a 63 y.o. female.    Chief Complaint: Vaginal Bleeding (X 1 day )    Pricilla Bower is 63 y.o. female who presents to clinic with complaints of 1 day of vaginal bleeding. Underwent menopause at the age of 47, has complaints of spotting and bulging sensation in the pelvic area. Has not issue with urination or defecation. Maternal history of breast and uterine cancer, diagnosed when she was 59-60 years old. Last mammogram was 6/2023 which was normal. Last pap smear was 6/2022 which was normal as well. Scheduled for colonoscopy in April 2024.     Review of Systems   Constitutional:  Negative for chills and fever.   HENT:  Negative for congestion, rhinorrhea and sore throat.    Respiratory:  Negative for cough, shortness of breath and wheezing.    Cardiovascular:  Negative for chest pain, palpitations and leg swelling.   Gastrointestinal:  Negative for abdominal pain, constipation, diarrhea, nausea and vomiting.   Genitourinary:  Negative for dysuria, frequency and urgency.   Neurological:  Negative for headaches.   Psychiatric/Behavioral:  Negative for dysphoric mood.        Objective:      Physical Exam  Vitals reviewed.   Constitutional:       Appearance: Normal appearance.   HENT:      Head: Normocephalic and atraumatic.   Eyes:      General: No scleral icterus.  Cardiovascular:      Rate and Rhythm: Normal rate and regular rhythm.      Heart sounds: No murmur heard.     No friction rub. No gallop.   Pulmonary:      Effort: Pulmonary effort is normal. No respiratory distress.      Breath sounds: Normal breath sounds. No stridor. No wheezing, rhonchi or rales.   Chest:      Chest wall: No tenderness.   Neurological:      General: No focal deficit present.      Mental Status: She is alert.   Psychiatric:         Mood and Affect: Mood normal.         Behavior: Behavior normal.         Assessment:       1. Post-menopausal bleeding        Plan:   1.  Post-menopausal bleeding  Acute, concern for possible endometrial hyperplasia vs endometrial cancer, will obtain transvaginal us and refer patient to GYN for further evaluation and treatment   -     CBC W/ AUTO DIFFERENTIAL; Future; Expected date: 02/02/2024  -     Cancel: US Pelvis Limited Non OB; Future; Expected date: 02/02/2024  -     Ambulatory referral/consult to Gynecology; Future; Expected date: 02/09/2024  -     US Pelvis Comp with Transvag NON-OB (xpd); Future; Expected date: 02/02/2024         Follow up if symptoms worsen or fail to improve.    Shahla Pozo MD  Family Medicine

## 2024-02-03 LAB
BASOPHILS # BLD AUTO: 0.02 K/UL (ref 0–0.2)
BASOPHILS NFR BLD: 0.3 % (ref 0–1.9)
DIFFERENTIAL METHOD BLD: ABNORMAL
EOSINOPHIL # BLD AUTO: 0.1 K/UL (ref 0–0.5)
EOSINOPHIL NFR BLD: 1.9 % (ref 0–8)
ERYTHROCYTE [DISTWIDTH] IN BLOOD BY AUTOMATED COUNT: 14 % (ref 11.5–14.5)
HCT VFR BLD AUTO: 37.7 % (ref 37–48.5)
HGB BLD-MCNC: 12 G/DL (ref 12–16)
IMM GRANULOCYTES # BLD AUTO: 0.02 K/UL (ref 0–0.04)
IMM GRANULOCYTES NFR BLD AUTO: 0.3 % (ref 0–0.5)
LYMPHOCYTES # BLD AUTO: 1.5 K/UL (ref 1–4.8)
LYMPHOCYTES NFR BLD: 23.2 % (ref 18–48)
MCH RBC QN AUTO: 28.9 PG (ref 27–31)
MCHC RBC AUTO-ENTMCNC: 31.8 G/DL (ref 32–36)
MCV RBC AUTO: 91 FL (ref 82–98)
MONOCYTES # BLD AUTO: 0.5 K/UL (ref 0.3–1)
MONOCYTES NFR BLD: 7.1 % (ref 4–15)
NEUTROPHILS # BLD AUTO: 4.3 K/UL (ref 1.8–7.7)
NEUTROPHILS NFR BLD: 67.2 % (ref 38–73)
NRBC BLD-RTO: 0 /100 WBC
PLATELET # BLD AUTO: 367 K/UL (ref 150–450)
PMV BLD AUTO: 10.3 FL (ref 9.2–12.9)
RBC # BLD AUTO: 4.15 M/UL (ref 4–5.4)
WBC # BLD AUTO: 6.46 K/UL (ref 3.9–12.7)

## 2024-02-07 ENCOUNTER — OFFICE VISIT (OUTPATIENT)
Dept: OBSTETRICS AND GYNECOLOGY | Facility: CLINIC | Age: 64
End: 2024-02-07
Payer: MEDICARE

## 2024-02-07 ENCOUNTER — HOSPITAL ENCOUNTER (OUTPATIENT)
Dept: RADIOLOGY | Facility: HOSPITAL | Age: 64
Discharge: HOME | End: 2024-02-07
Attending: FAMILY MEDICINE
Payer: MEDICARE

## 2024-02-07 VITALS
WEIGHT: 181.19 LBS | DIASTOLIC BLOOD PRESSURE: 84 MMHG | SYSTOLIC BLOOD PRESSURE: 122 MMHG | BODY MASS INDEX: 29.12 KG/M2 | HEIGHT: 66 IN

## 2024-02-07 DIAGNOSIS — N95.0 POST-MENOPAUSAL BLEEDING: Primary | ICD-10-CM

## 2024-02-07 DIAGNOSIS — N81.4 UTERINE PROLAPSE: ICD-10-CM

## 2024-02-07 DIAGNOSIS — N95.0 POST-MENOPAUSAL BLEEDING: ICD-10-CM

## 2024-02-07 PROCEDURE — 1101F PT FALLS ASSESS-DOCD LE1/YR: CPT | Mod: HCNC,CPTII,S$GLB, | Performed by: NURSE PRACTITIONER

## 2024-02-07 PROCEDURE — 3288F FALL RISK ASSESSMENT DOCD: CPT | Mod: HCNC,CPTII,S$GLB, | Performed by: NURSE PRACTITIONER

## 2024-02-07 PROCEDURE — 1159F MED LIST DOCD IN RCRD: CPT | Mod: HCNC,CPTII,S$GLB, | Performed by: NURSE PRACTITIONER

## 2024-02-07 PROCEDURE — 88341 IMHCHEM/IMCYTCHM EA ADD ANTB: CPT | Mod: 59,HCNC | Performed by: PATHOLOGY

## 2024-02-07 PROCEDURE — 1126F AMNT PAIN NOTED NONE PRSNT: CPT | Mod: HCNC,CPTII,S$GLB, | Performed by: NURSE PRACTITIONER

## 2024-02-07 PROCEDURE — 88305 TISSUE EXAM BY PATHOLOGIST: CPT | Mod: HCNC | Performed by: PATHOLOGY

## 2024-02-07 PROCEDURE — 88342 IMHCHEM/IMCYTCHM 1ST ANTB: CPT | Mod: 26,HCNC,59, | Performed by: PATHOLOGY

## 2024-02-07 PROCEDURE — 3074F SYST BP LT 130 MM HG: CPT | Mod: HCNC,CPTII,S$GLB, | Performed by: NURSE PRACTITIONER

## 2024-02-07 PROCEDURE — 88377 M/PHMTRC ALYS ISHQUANT/SEMIQ: CPT | Mod: HCNC | Performed by: PATHOLOGY

## 2024-02-07 PROCEDURE — 76830 TRANSVAGINAL US NON-OB: CPT | Mod: 26,HCNC,, | Performed by: RADIOLOGY

## 2024-02-07 PROCEDURE — 88341 IMHCHEM/IMCYTCHM EA ADD ANTB: CPT | Mod: 26,HCNC,59, | Performed by: PATHOLOGY

## 2024-02-07 PROCEDURE — 99212 OFFICE O/P EST SF 10 MIN: CPT | Mod: HCNC,S$GLB,, | Performed by: NURSE PRACTITIONER

## 2024-02-07 PROCEDURE — 76830 TRANSVAGINAL US NON-OB: CPT | Mod: TC,HCNC

## 2024-02-07 PROCEDURE — 88360 TUMOR IMMUNOHISTOCHEM/MANUAL: CPT | Mod: HCNC | Performed by: PATHOLOGY

## 2024-02-07 PROCEDURE — 3044F HG A1C LEVEL LT 7.0%: CPT | Mod: HCNC,CPTII,S$GLB, | Performed by: NURSE PRACTITIONER

## 2024-02-07 PROCEDURE — 88360 TUMOR IMMUNOHISTOCHEM/MANUAL: CPT | Mod: 26,HCNC,, | Performed by: PATHOLOGY

## 2024-02-07 PROCEDURE — 1160F RVW MEDS BY RX/DR IN RCRD: CPT | Mod: HCNC,CPTII,S$GLB, | Performed by: NURSE PRACTITIONER

## 2024-02-07 PROCEDURE — 99999 PR PBB SHADOW E&M-EST. PATIENT-LVL IV: CPT | Mod: PBBFAC,HCNC,, | Performed by: NURSE PRACTITIONER

## 2024-02-07 PROCEDURE — 3079F DIAST BP 80-89 MM HG: CPT | Mod: HCNC,CPTII,S$GLB, | Performed by: NURSE PRACTITIONER

## 2024-02-07 PROCEDURE — 88305 TISSUE EXAM BY PATHOLOGIST: CPT | Mod: 26,HCNC,, | Performed by: PATHOLOGY

## 2024-02-07 PROCEDURE — 88342 IMHCHEM/IMCYTCHM 1ST ANTB: CPT | Mod: 59,HCNC | Performed by: PATHOLOGY

## 2024-02-07 PROCEDURE — 76856 US EXAM PELVIC COMPLETE: CPT | Mod: 26,HCNC,, | Performed by: RADIOLOGY

## 2024-02-07 PROCEDURE — 3008F BODY MASS INDEX DOCD: CPT | Mod: HCNC,CPTII,S$GLB, | Performed by: NURSE PRACTITIONER

## 2024-02-07 NOTE — PROGRESS NOTES
Subjective:       Patient ID: Pricilla Bower is a 63 y.o. female.    Chief Complaint:  EMB    No LMP recorded. Patient is postmenopausal.  History of Present Illness  Comoplains of vaginal spotting and bleeding for two days   Bleeding has resolved     OB History    Para Term  AB Living   3 2 2   1     SAB IAB Ectopic Multiple Live Births   1              # Outcome Date GA Lbr Michael/2nd Weight Sex Delivery Anes PTL Lv   3 SAB            2 Term            1 Term                Review of Systems  Review of Systems        Objective:    Physical Exam  Genitourinary:     Vagina: No signs of injury and foreign body. No vaginal discharge, erythema, tenderness, bleeding, lesions or prolapsed vaginal walls.      Cervix: No cervical motion tenderness, discharge, friability, lesion, erythema, cervical bleeding or eversion.      Uterus: With uterine prolapse (stage 3).            Assessment:     1. Post-menopausal bleeding    2. Uterine prolapse              Plan:   Pricilla was seen today for emb.    Diagnoses and all orders for this visit:    Post-menopausal bleeding  -     Ambulatory referral/consult to Gynecology  -     Specimen to Pathology, Ob/Gyn    Uterine prolapse      CBC,TSH, Pap Smear are all negative   Asymptomatic Uterine Prolapse

## 2024-02-13 ENCOUNTER — OFFICE VISIT (OUTPATIENT)
Dept: PODIATRY | Facility: CLINIC | Age: 64
End: 2024-02-13
Payer: MEDICARE

## 2024-02-13 VITALS — HEIGHT: 66 IN | WEIGHT: 181 LBS | BODY MASS INDEX: 29.09 KG/M2

## 2024-02-13 DIAGNOSIS — E11.9 ENCOUNTER FOR COMPREHENSIVE DIABETIC FOOT EXAMINATION, TYPE 2 DIABETES MELLITUS: Primary | ICD-10-CM

## 2024-02-13 DIAGNOSIS — E11.42 PERIPHERAL SENSORY NEUROPATHY DUE TO TYPE 2 DIABETES MELLITUS: ICD-10-CM

## 2024-02-13 DIAGNOSIS — L84 CALLUS OF FOOT: ICD-10-CM

## 2024-02-13 PROCEDURE — 3008F BODY MASS INDEX DOCD: CPT | Mod: HCNC,CPTII,S$GLB, | Performed by: PODIATRIST

## 2024-02-13 PROCEDURE — 3288F FALL RISK ASSESSMENT DOCD: CPT | Mod: HCNC,CPTII,S$GLB, | Performed by: PODIATRIST

## 2024-02-13 PROCEDURE — 1125F AMNT PAIN NOTED PAIN PRSNT: CPT | Mod: HCNC,CPTII,S$GLB, | Performed by: PODIATRIST

## 2024-02-13 PROCEDURE — 99204 OFFICE O/P NEW MOD 45 MIN: CPT | Mod: 25,HCNC,S$GLB, | Performed by: PODIATRIST

## 2024-02-13 PROCEDURE — 99999 PR PBB SHADOW E&M-EST. PATIENT-LVL IV: CPT | Mod: PBBFAC,HCNC,, | Performed by: PODIATRIST

## 2024-02-13 PROCEDURE — 1160F RVW MEDS BY RX/DR IN RCRD: CPT | Mod: HCNC,CPTII,S$GLB, | Performed by: PODIATRIST

## 2024-02-13 PROCEDURE — 1101F PT FALLS ASSESS-DOCD LE1/YR: CPT | Mod: HCNC,CPTII,S$GLB, | Performed by: PODIATRIST

## 2024-02-13 PROCEDURE — 3044F HG A1C LEVEL LT 7.0%: CPT | Mod: HCNC,CPTII,S$GLB, | Performed by: PODIATRIST

## 2024-02-13 PROCEDURE — 1159F MED LIST DOCD IN RCRD: CPT | Mod: HCNC,CPTII,S$GLB, | Performed by: PODIATRIST

## 2024-02-13 NOTE — PROGRESS NOTES
Subjective:     Patient ID: Pricilla Bower is a 63 y.o. female.    Chief Complaint: Callouses (Coming in for a callous on the right foot, pt also c/o pain in her great right toe on the medial side, pt toe is black and discolored pt rates pain 7/10, pt is diabetic, pt last seem pcp 2/2/24)    Pricilla is a 63 y.o. female who presents to the clinic for evaluation and treatment of high risk feet. Pricilla has a past medical history of Diabetes mellitus, type 2 (2010), Hyperlipidemia, Hypertension, Noncompliance with treatment plan, Obesity (BMI 30.0-34.9), and Vitamin D deficiency. The patient's chief complaint is painful callus on right foot. This patient has documented high risk feet requiring routine maintenance secondary to peripheral neuropathy.    PCP: Dolores Kelly, NP    Date Last Seen by PCP: 02/02/2024    Current shoe gear:  Affected Foot: Boots     Unaffected Foot: Boots    Hemoglobin A1C   Date Value Ref Range Status   01/22/2024 6.3 (H) 4.0 - 5.6 % Final     Comment:     ADA Screening Guidelines:  5.7-6.4%  Consistent with prediabetes  >or=6.5%  Consistent with diabetes    High levels of fetal hemoglobin interfere with the HbA1C  assay. Heterozygous hemoglobin variants (HbS, HgC, etc)do  not significantly interfere with this assay.   However, presence of multiple variants may affect accuracy.     06/07/2023 7.8 (H) 4.0 - 5.6 % Final     Comment:     ADA Screening Guidelines:  5.7-6.4%  Consistent with prediabetes  >or=6.5%  Consistent with diabetes    High levels of fetal hemoglobin interfere with the HbA1C  assay. Heterozygous hemoglobin variants (HbS, HgC, etc)do  not significantly interfere with this assay.   However, presence of multiple variants may affect accuracy.     06/01/2022 6.5 (H) 4.0 - 5.6 % Final     Comment:     ADA Screening Guidelines:  5.7-6.4%  Consistent with prediabetes  >or=6.5%  Consistent with diabetes    High levels of fetal hemoglobin interfere with the HbA1C  assay. Heterozygous  hemoglobin variants (HbS, HgC, etc)do  not significantly interfere with this assay.   However, presence of multiple variants may affect accuracy.         Patient Active Problem List   Diagnosis    Bilateral foot pain    Mixed hyperlipidemia    Diabetes mellitus, type 2    Hypertension    Overweight (BMI 25.0-29.9)    Vitamin D deficiency    Peripheral sensory neuropathy due to type 2 diabetes mellitus    Psoriasis of scalp       Medication List with Changes/Refills   Current Medications    AMLODIPINE (NORVASC) 5 MG TABLET    Take 1 tablet by mouth once daily    ASPIRIN 81 MG CHEW    Take 1 tablet (81 mg total) by mouth once daily.    ATORVASTATIN (LIPITOR) 40 MG TABLET    Take 1 tablet (40 mg total) by mouth every evening.    BETAMETHASONE DIPROPIONATE (DIPROLENE) 0.05 % OINTMENT    Apply topically 2 (two) times daily.    BLOOD SUGAR DIAGNOSTIC STRP    To check BG 2 times daily, to use with insurance preferred meter    BLOOD-GLUCOSE METER KIT    To check BG 2 times daily, to use with insurance preferred meter/Accu-Check Guide ME    CICLOPIROX (PENLAC) 8 % SOLN    Apply to fingernails and adjacent skin once daily in combination with weekly nail trimming. Remove with alcohol every 7 days; continue therapy until nail clears    CYCLOBENZAPRINE (FLEXERIL) 5 MG TABLET    Take 1 tablet (5 mg total) by mouth 2 (two) times daily as needed (muscle pain). Will cause drowsiness    DICLOFENAC (VOLTAREN) 50 MG EC TABLET    Take 1 tablet (50 mg total) by mouth 2 (two) times daily as needed (pain). Take with food    GABAPENTIN (NEURONTIN) 300 MG CAPSULE    Take 1 capsule (300 mg total) by mouth 3 (three) times daily.    KETOCONAZOLE (NIZORAL) 2 % CREAM    Apply topically 2 (two) times daily. Continue use for 1 week after resolution of fungal rash.    KETOCONAZOLE (NIZORAL) 2 % SHAMPOO    Apply topically twice a week.    LANCETS MISC    To check BG 2 times daily, to use with insurance preferred meter    LATANOPROST 0.005 %  "OPHTHALMIC SOLUTION    Place 1 drop into both eyes every evening.    MELOXICAM (MOBIC) 7.5 MG TABLET    Take 7.5 mg by mouth once daily.    METFORMIN (GLUCOPHAGE) 1000 MG TABLET    TAKE 1 TABLET BY MOUTH TWICE DAILY WITH MEALS    MOUNJARO 2.5 MG/0.5 ML PNIJ    SMARTSI.5 Milligram(s) SUB-Q Once a Week    TIMOLOL MALEATE 0.5% (TIMOPTIC) 0.5 % DROP    Place 1 drop into both eyes every morning.    UBIDECARENONE (COENZYME Q10) 100 MG TAB    Take 1 tablet (100 mg total) by mouth once daily.       Review of patient's allergies indicates:   Allergen Reactions    Darvocet a500 [propoxyphene n-acetaminophen] Nausea And Vomiting    Penicillins        Past Surgical History:   Procedure Laterality Date    NO PAST SURGERIES         Family History   Problem Relation Age of Onset    Cancer Mother         cervical    Breast cancer Mother        Social History     Socioeconomic History    Marital status:    Tobacco Use    Smoking status: Never     Passive exposure: Never    Smokeless tobacco: Never   Substance and Sexual Activity    Alcohol use: No    Drug use: No    Sexual activity: Yes     Partners: Male     Comment:  16 years to her    Social History Narrative    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[employer] store, [employer]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 [employer] full-time)    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: 1 plain biscuit w/ grape jelly; Sprite    Lunch: 3p: Salad or ribs from [employer]; Sweetened tea    Dinner: Banana or other fruit; water    Snacks: None    Eats out: 7d/wk    Water: 100 oz/day (started 3 days ago)    PA: None    Goal weight is 170 lbs by 17       Vitals:    24 0836   Weight: 82.1 kg (181 lb)   Height: 5' 6" (1.676 m)   PainSc:   7       Hemoglobin A1C   Date Value Ref Range Status   2024 6.3 (H) 4.0 - 5.6 % Final     Comment:     ADA Screening Guidelines:  5.7-6.4%  Consistent with prediabetes  >or=6.5%  Consistent with diabetes    High levels of fetal hemoglobin interfere with the " HbA1C  assay. Heterozygous hemoglobin variants (HbS, HgC, etc)do  not significantly interfere with this assay.   However, presence of multiple variants may affect accuracy.     06/07/2023 7.8 (H) 4.0 - 5.6 % Final     Comment:     ADA Screening Guidelines:  5.7-6.4%  Consistent with prediabetes  >or=6.5%  Consistent with diabetes    High levels of fetal hemoglobin interfere with the HbA1C  assay. Heterozygous hemoglobin variants (HbS, HgC, etc)do  not significantly interfere with this assay.   However, presence of multiple variants may affect accuracy.     06/01/2022 6.5 (H) 4.0 - 5.6 % Final     Comment:     ADA Screening Guidelines:  5.7-6.4%  Consistent with prediabetes  >or=6.5%  Consistent with diabetes    High levels of fetal hemoglobin interfere with the HbA1C  assay. Heterozygous hemoglobin variants (HbS, HgC, etc)do  not significantly interfere with this assay.   However, presence of multiple variants may affect accuracy.         Review of Systems   Constitutional:  Negative for chills and fever.   Respiratory:  Negative for shortness of breath.    Cardiovascular:  Negative for chest pain, palpitations, orthopnea, claudication and leg swelling.   Gastrointestinal:  Negative for diarrhea, nausea and vomiting.   Musculoskeletal:  Negative for joint pain.   Skin:  Negative for rash.   Neurological:  Positive for tingling.   Psychiatric/Behavioral: Negative.               Objective:      PHYSICAL EXAM: Apperance: Alert and orient in no distress,well developed, and with good attention to grooming and body habits  Patient presents ambulating in boots  LOWER EXTREMITY EXAM:  VASCULAR: Dorsalis pedis pulses 2/4 bilateral and Posterior Tibial pulses 2/4 bilateral. Capillary fill time <blanched bilateral. No edema observed bilateral. Varicosities absent bilateral. Skin temperature of the lower extremities is warm to warm, proximal to distal. Hair growth WNL bilateral.  DERMATOLOGICAL: No skin rashes, subcutaneous  nodules, lesions, or ulcers observed bilateral. Nails 1,2,3,4,5 bilateral  thickened, and discolored with subungual debris. Webspaces 1,2,3,4 bilateral clean, dry and without evidence of break in skin integrity. Mild hyperkeratotic tissue noted to right platnar 2nd submetatarsal  NEUROLOGICAL: Light touch, sharp-dull, proprioception all present and equal bilaterally.  Vibratory sensation intact at bilateral hallux. Protective sensation intact at all 10 sites as tested with a [place] 5.07 monofilament.   MUSCULOSKELETAL: Muscle strength is 5/5 for foot inverters, everters, plantarflexors, and dorsiflexors. Muscle tone is normal. Mild bunions noted bilateral.         Assessment:       ICD-10-CM ICD-9-CM   1. Encounter for comprehensive diabetic foot examination, type 2 diabetes mellitus  E11.9 250.00   2. Callus of foot - Right Foot  L84 700   3. Peripheral sensory neuropathy due to type 2 diabetes mellitus  E11.42 250.60     356.2       Plan:   Encounter for comprehensive diabetic foot examination, type 2 diabetes mellitus    Callus of foot - Right Foot  -     Ambulatory referral/consult to Podiatry    Peripheral sensory neuropathy due to type 2 diabetes mellitus  -     Ambulatory referral/consult to Podiatry      I counseled the patient on her conditions, regarding findings of my examination, my impressions, and usual treatment plan.   Greater than 50% of this visit spent on counseling and coordination of care.  Greater than 12 minutes of a 15 minute appointment spent on education about the diabetic foot, neuropathy, and prevention of limb loss.  Shoe inspection. Diabetic Foot Education. Patient reminded of the importance of good nutrition and blood sugar control to help prevent podiatric complications of diabetes. Patient instructed on proper foot hygeine. We discussed wearing proper shoe gear, daily foot inspections, never walking without protective shoe gear, never putting sharp instruments to feet.     The patient and I reviewed the types of shoes she should be wearing, my recommendation includes generally the best time of the day for a shoe fitting is the afternoon, shoes with a wide toe box, very good cushion, and tennis shoes with removable inner soles.The patient and I reviewed my recommendations for over-the-counter orthotic inserts.   Patient  will continue to monitor the areas daily, inspect feet, wear protective shoe gear when ambulatory, moisturizer to maintain skin integrity. Patient reminded of the importance of good nutrition and blood sugar control to help prevent podiatric complications of diabetes.  Patient to return 6 months or sooner if needed.              Rene Ken DPM  [facility]

## 2024-02-19 ENCOUNTER — TELEPHONE (OUTPATIENT)
Dept: OBSTETRICS AND GYNECOLOGY | Facility: CLINIC | Age: 64
End: 2024-02-19
Payer: MEDICARE

## 2024-02-19 DIAGNOSIS — C54.1 ENDOMETRIAL CANCER: Primary | ICD-10-CM

## 2024-02-19 NOTE — TELEPHONE ENCOUNTER
Called patient and she was told on Friday that she is not in network with [facility] and would have to see [facility].  Advised message would be sent to [facility] for internal referral.

## 2024-02-19 NOTE — TELEPHONE ENCOUNTER
----- Message from Ashely Shahid sent at 2/19/2024  8:19 AM CST -----  Contact: [facility] is calling in regards to patient referral. They stated that clinic is not in network with patient insurance and she would have to be referred to a [facility]. Please call office back at [phone]. Thanks KB

## 2024-02-20 ENCOUNTER — TELEPHONE (OUTPATIENT)
Dept: OBSTETRICS AND GYNECOLOGY | Facility: CLINIC | Age: 64
End: 2024-02-20
Payer: MEDICARE

## 2024-02-20 ENCOUNTER — TELEPHONE (OUTPATIENT)
Dept: GYNECOLOGIC ONCOLOGY | Facility: CLINIC | Age: 64
End: 2024-02-20
Payer: MEDICARE

## 2024-02-20 NOTE — TELEPHONE ENCOUNTER
I returned the patient call and she thought that our providers was still seeing patients in [place] however I offer her to come to [facility], [place]  or [place] . She will call back once she talk with her family member  that will be taking her to the appointment our providers haven't been in [place] for about 2 years now.  ----- Message from Alicia Christopher RN sent at 2/20/2024  2:41 PM CST -----  Regarding: referral    ----- Message -----  From: Aimee Baumann  Sent: 2/20/2024   2:30 PM CST  To: [facility] Staff     Name of Who is Calling:     What is the request in detail: patient request call back  in reference to schedule appointment  in [place] / patient referred by dr stella sher  Please contact to further discuss and advise      Can the clinic reply by [facility]:     What Number to Call Back if not in [facility]:   [phone]

## 2024-02-20 NOTE — TELEPHONE ENCOUNTER
----- Message from Pratik Oleary MA sent at 2/20/2024  2:48 PM CST -----  Hi, I just spoke with your patient and she thought that our providers was still seeing patients in [place] however I offer her to come to [facility]  or [place] . She will call back once she talk with her family member  that will be taking her to the appointment. FYI our provides haven't been in [place] for about 2 years now. Thanks Pratik

## 2024-02-21 ENCOUNTER — TELEPHONE (OUTPATIENT)
Dept: GYNECOLOGIC ONCOLOGY | Facility: CLINIC | Age: 64
End: 2024-02-21
Payer: MEDICARE

## 2024-02-23 ENCOUNTER — OFFICE VISIT (OUTPATIENT)
Dept: INTERNAL MEDICINE | Facility: CLINIC | Age: 64
End: 2024-02-23
Payer: MEDICARE

## 2024-02-23 VITALS
BODY MASS INDEX: 28.74 KG/M2 | HEART RATE: 77 BPM | DIASTOLIC BLOOD PRESSURE: 74 MMHG | WEIGHT: 178.81 LBS | HEIGHT: 66 IN | SYSTOLIC BLOOD PRESSURE: 126 MMHG | RESPIRATION RATE: 18 BRPM | OXYGEN SATURATION: 97 % | TEMPERATURE: 96 F

## 2024-02-23 DIAGNOSIS — C54.1 ENDOMETRIAL ADENOCARCINOMA: ICD-10-CM

## 2024-02-23 DIAGNOSIS — E11.00 TYPE 2 DIABETES MELLITUS WITH HYPEROSMOLARITY WITHOUT COMA, WITHOUT LONG-TERM CURRENT USE OF INSULIN: ICD-10-CM

## 2024-02-23 DIAGNOSIS — Z00.00 ROUTINE MEDICAL EXAM: Primary | ICD-10-CM

## 2024-02-23 DIAGNOSIS — I10 PRIMARY HYPERTENSION: ICD-10-CM

## 2024-02-23 PROCEDURE — 3008F BODY MASS INDEX DOCD: CPT | Mod: HCNC,CPTII,S$GLB, | Performed by: NURSE PRACTITIONER

## 2024-02-23 PROCEDURE — 3074F SYST BP LT 130 MM HG: CPT | Mod: HCNC,CPTII,S$GLB, | Performed by: NURSE PRACTITIONER

## 2024-02-23 PROCEDURE — 1126F AMNT PAIN NOTED NONE PRSNT: CPT | Mod: HCNC,CPTII,S$GLB, | Performed by: NURSE PRACTITIONER

## 2024-02-23 PROCEDURE — 99214 OFFICE O/P EST MOD 30 MIN: CPT | Mod: HCNC,S$GLB,, | Performed by: NURSE PRACTITIONER

## 2024-02-23 PROCEDURE — 1160F RVW MEDS BY RX/DR IN RCRD: CPT | Mod: HCNC,CPTII,S$GLB, | Performed by: NURSE PRACTITIONER

## 2024-02-23 PROCEDURE — 1159F MED LIST DOCD IN RCRD: CPT | Mod: HCNC,CPTII,S$GLB, | Performed by: NURSE PRACTITIONER

## 2024-02-23 PROCEDURE — 3044F HG A1C LEVEL LT 7.0%: CPT | Mod: HCNC,CPTII,S$GLB, | Performed by: NURSE PRACTITIONER

## 2024-02-23 PROCEDURE — 3288F FALL RISK ASSESSMENT DOCD: CPT | Mod: HCNC,CPTII,S$GLB, | Performed by: NURSE PRACTITIONER

## 2024-02-23 PROCEDURE — 3078F DIAST BP <80 MM HG: CPT | Mod: HCNC,CPTII,S$GLB, | Performed by: NURSE PRACTITIONER

## 2024-02-23 PROCEDURE — 1101F PT FALLS ASSESS-DOCD LE1/YR: CPT | Mod: HCNC,CPTII,S$GLB, | Performed by: NURSE PRACTITIONER

## 2024-02-23 PROCEDURE — 99999 PR PBB SHADOW E&M-EST. PATIENT-LVL IV: CPT | Mod: PBBFAC,HCNC,, | Performed by: NURSE PRACTITIONER

## 2024-02-23 RX ORDER — METFORMIN HYDROCHLORIDE 1000 MG/1
1000 TABLET ORAL
Qty: 180 TABLET | Refills: 3
Start: 2024-02-23 | End: 2024-05-06 | Stop reason: SDUPTHER

## 2024-02-23 NOTE — PROGRESS NOTES
Subjective     Patient ID: Pricilla Bower is a 63 y.o. female.    Chief Complaint: Follow-up (6 mo)    Patient presents for routine follow up.   Overall, doing well.  Labs in Jan-stable.       Had visit with Dr. Pozo 02/02/2024 for Post-menopausal bleeding x 1 day.   Ultrasound ordered and identified thickening lining of uterus.  Was referred to GYN.  Biopsy--endometrial adenocarcinoma.    Has GYN Oncology visit scheduled for 02/29/2024- Dr. Goldberg.  Denies any bleeding or pain.      Medical History: Bilateral foot pain, HLP, .HTN, Type II DM, Vit D Def, Peripheral Neuropathy, Overweight, Endometrial adenocarcinoma    Follow-up  Pertinent negatives include no abdominal pain, chest pain, chills, coughing, fatigue or fever.   Review of Systems   Constitutional:  Negative for chills, fatigue and fever.   Respiratory:  Negative for cough and shortness of breath.    Cardiovascular:  Negative for chest pain and leg swelling.   Gastrointestinal:  Negative for abdominal pain, constipation and diarrhea.   Psychiatric/Behavioral:  Negative for agitation and confusion.           Objective     Physical Exam  Vitals reviewed.   Constitutional:       Appearance: Normal appearance.   HENT:      Head: Normocephalic.      Right Ear: Tympanic membrane normal.      Left Ear: Tympanic membrane normal.      Nose: Nose normal.   Cardiovascular:      Rate and Rhythm: Normal rate and regular rhythm.   Pulmonary:      Effort: Pulmonary effort is normal.      Breath sounds: Normal breath sounds.   Abdominal:      General: Bowel sounds are normal.   Musculoskeletal:         General: Normal range of motion.   Skin:     General: Skin is warm.   Neurological:      General: No focal deficit present.      Mental Status: She is alert.   Psychiatric:         Mood and Affect: Mood normal.         Behavior: Behavior is cooperative.            Assessment and Plan     1. Routine medical exam    2. Type 2 diabetes mellitus with hyperosmolarity  without coma, without long-term current use of insulin  Comments:  Stable diabetes.  Continue metformin and Mounjaro.  Orders:  -     metFORMIN (GLUCOPHAGE) 1000 MG tablet; Take 1 tablet (1,000 mg total) by mouth daily with breakfast.  Dispense: 180 tablet; Refill: 3  -     LIPID PANEL; Future; Expected date: 02/23/2024  -     HEMOGLOBIN A1C; Future; Expected date: 02/23/2024  -     Comprehensive Metabolic Panel; Future; Expected date: 02/23/2024  -     Microalbumin/Creatinine Ratio, Urine; Future; Expected date: 02/23/2024  -     TSH; Future; Expected date: 02/23/2024    3. Endometrial adenocarcinoma  Comments:  Has upcoming appointment with GYN Oncologist.    4. Primary hypertension  Comments:  Stable.  Continue current treatment plan.        6 month --annual and labs before that time        Follow up in about 6 months (around 8/23/2024).

## 2024-02-28 NOTE — PROGRESS NOTES
SUBJECTIVE     Chief complaint: Endometrial Cancer    History of present Illness:  Pricilla Bower is a 63 y.o.  female referred by Breana Avilez NP for new diagnosis of high-grade serous endometrial adenocarcinoma. She presented to her PCP 24 with complaints of post menopausal bleeding and bulging sensation in pelvic area.     24 EMB-   High-grade serous carcinoma. ER/FL+, HER2 equivocal, p16+, P53+   HER2 fish negative    24 Pelvic US-   Uterus is retroflexed or retroverted length 10 cm. Endometrium heterogeneous appears partially cystic thickened up to 3 cm with vascular flow. Right ovary unremarkable in the left is not seen.     Today she reports occasional pelvic pressure but denies continued vaginal bleeding. She denies changes in her bowel or bladder habits. She has had SVDx2; denies any previous abdominal or pelvic surgeries. She has a family history of breast ad cervical cancer in her mother. Denies any family history of endometrial or ovarian cancer. She is up to date on Pap screening and denies history of abnormal results. She has never had colon cancer screening; is scheduled for colonoscopy in November.      Review of Systems   Constitutional:  Negative for appetite change, chills and fever.   Respiratory:  Negative for cough and shortness of breath.    Cardiovascular:  Negative for chest pain.   Gastrointestinal:  Negative for abdominal distention, abdominal pain, constipation, diarrhea and vomiting.   Genitourinary:  Negative for difficulty urinating, pelvic pain, vaginal bleeding and vaginal discharge.    Skin: Negative.    Hematological:  Negative for adenopathy.   Psychiatric/Behavioral: Negative.        Review of patient's allergies indicates:   Allergen Reactions    Darvocet a500 [propoxyphene n-acetaminophen] Nausea And Vomiting    Penicillins      Current Outpatient Medications   Medication Instructions    amLODIPine (NORVASC) 5 mg, Oral    aspirin 81 mg, Oral, Daily     atorvastatin (LIPITOR) 40 mg, Oral, Nightly    betamethasone dipropionate (DIPROLENE) 0.05 % ointment Topical (Top), 2 times daily    blood sugar diagnostic Strp To check BG 2 times daily, to use with insurance preferred meter    blood-glucose meter kit To check BG 2 times daily, to use with insurance preferred meter/Accu-Check Guide ME    ciclopirox (PENLAC) 8 % Soln Apply to fingernails and adjacent skin once daily in combination with weekly nail trimming. Remove with alcohol every 7 days; continue therapy until nail clears    coenzyme Q10 100 mg, Oral, Daily    cyclobenzaprine (FLEXERIL) 5 mg, Oral, 2 times daily PRN, Will cause drowsiness    diclofenac (VOLTAREN) 50 mg, Oral, 2 times daily PRN, Take with food    gabapentin (NEURONTIN) 300 mg, Oral, 3 times daily    ketoconazole (NIZORAL) 2 % cream Topical (Top), 2 times daily, Continue use for 1 week after resolution of fungal rash.    ketoconazole (NIZORAL) 2 % shampoo Topical (Top), Twice weekly    lancets Misc To check BG 2 times daily, to use with insurance preferred meter    latanoprost 0.005 % ophthalmic solution 1 drop, Both Eyes, Nightly    meloxicam (MOBIC) 7.5 mg, Oral, Daily    metFORMIN (GLUCOPHAGE) 1,000 mg, Oral, With breakfast    MOUNJARO 2.5 mg/0.5 mL PnIj SMARTSI.5 Milligram(s) SUB-Q Once a Week    timolol maleate 0.5% (TIMOPTIC) 0.5 % Drop 1 drop, Both Eyes, Every morning     Past Medical History:   Diagnosis Date    Diabetes mellitus, type 2     A1c >14% on 20 & 11% on 3/8/19; Lost to F/U as of 2019; to ER for glu >500 at [facility] 20    Hyperlipidemia     Hypertension     Noncompliance with treatment plan     Uncontrolled DM2 due to noncompliance with insulin and follow up; A1c 11% on 3/8/19; Lost to F/U as of 2019; to ER for glu >500 at [facility] 20 -> A1c >14% on 20 -> rec f/u in 1 wk with FBG log to demontrate understanding of insulin titration    Obesity (BMI 30.0-34.9)     Vitamin D deficiency       Past Surgical  History:   Procedure Laterality Date    NO PAST SURGERIES        OB History    Para Term  AB Living   3 2 2   1     SAB IAB Ectopic Multiple Live Births   1              # Outcome Date GA Lbr Michael/2nd Weight Sex Delivery Anes PTL Lv   3 SAB            2 Term            1 Term              Social History     Tobacco Use    Smoking status: Never     Passive exposure: Never    Smokeless tobacco: Never   Substance Use Topics    Alcohol use: No    Drug use: No      Family History   Problem Relation Age of Onset    Breast cancer Mother     Cervical cancer Mother      Health Maintenance Topics with due status: Not Due       Topic Last Completion Date    Diabetes Urine Screening 2023    Lipid Panel 2023    Mammogram 2023    Eye Exam 2023    Hemoglobin A1c 2024    Foot Exam 2024    Low Dose Statin 2024     Health Maintenance Due   Topic Date Due    COVID-19 Vaccine (1) Never done    Pneumococcal Vaccines (Age 65+) (1 of 2 - PCV) Never done    TETANUS VACCINE  Never done    Shingles Vaccine (1 of 2) Never done    DEXA Scan  Never done    Colorectal Cancer Screening  Never done    RSV Vaccine (Age 60+ and Pregnant patients) (1 - 1-dose 60+ series) Never done    Influenza Vaccine (1) Never done     OBJECTIVE     There were no vitals taken for this visit.    Physical Exam  Constitutional:       Appearance: Normal appearance.   Neurological:      General: No focal deficit present.      Mental Status: She is alert. Mental status is at baseline.   Psychiatric:         Mood and Affect: Mood normal.         Behavior: Behavior normal.       ASSESSMENT     1. Endometrial cancer  - Ambulatory referral/consult to Gynecologic Oncology  - ; Future  - CT Chest Abdomen Pelvis With IV Contrast (XPD) Routine Oral Contrast; Future  - CBC W/ AUTO DIFFERENTIAL; Future  - Comprehensive Metabolic Panel; Future  - EKG 12-lead; Future  - Case Request Operating Room: XI ROBOTIC HYSTERECTOMY,  XI ROBOTIC SALPINGO-OOPHORECTOMY, MAPPING, LYMPH NODE, SENTINEL    2. Pre-op testing  - CBC W/ AUTO DIFFERENTIAL; Future  - Comprehensive Metabolic Panel; Future  - EKG 12-lead; Future      PLAN   I had an extensive conversation with the patient and her  who was present with her at today's visit giving a broad overview of endometrial cancer to include epidemiology, risk factors, clinical features, diagnosis, as well as staging and surgical treatment.  I have recommended robotic-assisted hysterectomy, bilateral salpingo-oophorectomy, sentinel lymph node mapping and biopsy and staging.  Based on the data from surgery we will determine adjuvant therapy recommendations.  We will also have a better-informed discussion about prognosis.      Serous histology and will obtain baseline  and CT CAP for metastatic survey.      The risks, benefits, and indications of the procedure were discussed with the patient and her family members if present.  These included bleeding, transfusion, infection, damage to surrounding tissues (bowel, bladder, ureter), wound separation, lymphedema, conversion to laparotomy if laparoscopic, perioperative cardiac events, VTE, pneumonia, and possible death.   She voiced understanding, all questions were answered and consents were signed.     Surgery 3/18/2024 [facility]    I spent approximately 60 minutes reviewing the available records and evaluating the patient, out of which over 50% of the time was spent face to face with the patient in counseling and coordinating this patient's care.

## 2024-02-29 ENCOUNTER — OFFICE VISIT (OUTPATIENT)
Dept: GYNECOLOGIC ONCOLOGY | Facility: CLINIC | Age: 64
End: 2024-02-29
Payer: MEDICARE

## 2024-02-29 DIAGNOSIS — Z01.818 PRE-OP TESTING: Primary | ICD-10-CM

## 2024-02-29 DIAGNOSIS — C54.1 ENDOMETRIAL CANCER: ICD-10-CM

## 2024-02-29 PROCEDURE — 99999 PR PBB SHADOW E&M-EST. PATIENT-LVL III: CPT | Mod: PBBFAC,HCNC,, | Performed by: OBSTETRICS & GYNECOLOGY

## 2024-02-29 PROCEDURE — 3008F BODY MASS INDEX DOCD: CPT | Mod: HCNC,CPTII,S$GLB, | Performed by: OBSTETRICS & GYNECOLOGY

## 2024-02-29 PROCEDURE — 99205 OFFICE O/P NEW HI 60 MIN: CPT | Mod: HCNC,S$GLB,, | Performed by: OBSTETRICS & GYNECOLOGY

## 2024-02-29 PROCEDURE — 3044F HG A1C LEVEL LT 7.0%: CPT | Mod: HCNC,CPTII,S$GLB, | Performed by: OBSTETRICS & GYNECOLOGY

## 2024-03-01 LAB
FINAL PATHOLOGIC DIAGNOSIS: NORMAL
GROSS: NORMAL
Lab: NORMAL
SUPPLEMENTAL DIAGNOSIS: NORMAL

## 2024-03-03 VITALS — BODY MASS INDEX: 28.73 KG/M2 | WEIGHT: 178 LBS

## 2024-03-03 RX ORDER — CLINDAMYCIN PHOSPHATE 900 MG/50ML
900 INJECTION, SOLUTION INTRAVENOUS
Status: CANCELLED | OUTPATIENT
Start: 2024-03-03

## 2024-03-03 RX ORDER — HEPARIN SODIUM 5000 [USP'U]/ML
5000 INJECTION, SOLUTION INTRAVENOUS; SUBCUTANEOUS ONCE
Status: CANCELLED | OUTPATIENT
Start: 2024-03-18

## 2024-03-03 RX ORDER — SODIUM CHLORIDE 9 MG/ML
INJECTION, SOLUTION INTRAVENOUS CONTINUOUS
Status: CANCELLED | OUTPATIENT
Start: 2024-03-03

## 2024-03-03 RX ORDER — MUPIROCIN 20 MG/G
OINTMENT TOPICAL
Status: CANCELLED | OUTPATIENT
Start: 2024-03-03

## 2024-03-03 RX ORDER — FAMOTIDINE 20 MG/1
20 TABLET, FILM COATED ORAL
Status: CANCELLED | OUTPATIENT
Start: 2024-03-03

## 2024-03-11 ENCOUNTER — HOSPITAL ENCOUNTER (OUTPATIENT)
Dept: RADIOLOGY | Facility: HOSPITAL | Age: 64
Discharge: HOME | End: 2024-03-11
Attending: OBSTETRICS & GYNECOLOGY
Payer: MEDICARE

## 2024-03-11 ENCOUNTER — TELEPHONE (OUTPATIENT)
Dept: GYNECOLOGIC ONCOLOGY | Facility: CLINIC | Age: 64
End: 2024-03-11
Payer: MEDICARE

## 2024-03-11 DIAGNOSIS — C54.1 ENDOMETRIAL CANCER: ICD-10-CM

## 2024-03-11 PROCEDURE — 25500020 PHARM REV CODE 255: Mod: HCNC,PO | Performed by: OBSTETRICS & GYNECOLOGY

## 2024-03-11 PROCEDURE — 74177 CT ABD & PELVIS W/CONTRAST: CPT | Mod: 26,HCNC,, | Performed by: RADIOLOGY

## 2024-03-11 PROCEDURE — 71260 CT THORAX DX C+: CPT | Mod: 26,HCNC,, | Performed by: RADIOLOGY

## 2024-03-11 PROCEDURE — 74177 CT ABD & PELVIS W/CONTRAST: CPT | Mod: TC,HCNC,PO

## 2024-03-11 PROCEDURE — A9698 NON-RAD CONTRAST MATERIALNOC: HCPCS | Mod: HCNC,PO | Performed by: OBSTETRICS & GYNECOLOGY

## 2024-03-11 RX ADMIN — IOHEXOL 500 ML: 12 SOLUTION ORAL at 08:03

## 2024-03-11 RX ADMIN — IOHEXOL 100 ML: 350 INJECTION, SOLUTION INTRAVENOUS at 08:03

## 2024-03-11 NOTE — TELEPHONE ENCOUNTER
Spoke with patient. Reviewed imaging which shows no obvious evidence of metastatic disease. Plan to proceed with surgery as scheduled.

## 2024-03-18 DIAGNOSIS — C54.1 ENDOMETRIAL CANCER: Primary | ICD-10-CM

## 2024-03-18 RX ORDER — IBUPROFEN 600 MG/1
600 TABLET ORAL EVERY 8 HOURS PRN
Qty: 30 TABLET | Refills: 0 | Status: SHIPPED | OUTPATIENT
Start: 2024-03-18

## 2024-03-18 RX ORDER — OXYCODONE AND ACETAMINOPHEN 5; 325 MG/1; MG/1
1 TABLET ORAL EVERY 6 HOURS PRN
Qty: 20 TABLET | Refills: 0 | Status: SHIPPED | OUTPATIENT
Start: 2024-03-18

## 2024-03-19 DIAGNOSIS — Z79.4 TYPE 2 DIABETES MELLITUS WITH HYPEROSMOLARITY WITHOUT COMA, WITH LONG-TERM CURRENT USE OF INSULIN: ICD-10-CM

## 2024-03-19 DIAGNOSIS — E11.00 TYPE 2 DIABETES MELLITUS WITH HYPEROSMOLARITY WITHOUT COMA, WITH LONG-TERM CURRENT USE OF INSULIN: ICD-10-CM

## 2024-03-19 RX ORDER — NAPROXEN SODIUM 220 MG/1
81 TABLET, FILM COATED ORAL DAILY
Qty: 100 TABLET | Refills: 5 | OUTPATIENT
Start: 2024-03-19 | End: 2025-03-19

## 2024-03-19 RX ORDER — NAPROXEN SODIUM 220 MG/1
81 TABLET, FILM COATED ORAL DAILY
Qty: 100 TABLET | Refills: 5 | Status: CANCELLED | OUTPATIENT
Start: 2024-03-19 | End: 2025-03-19

## 2024-03-19 NOTE — TELEPHONE ENCOUNTER
----- Message from Noelle Smiley, Patient Care Assistant sent at 3/19/2024 11:07 AM CDT -----  Type: Needs Medical Advice  Who Called:  roseann    Pharmacy name and phone #:    [id number] - [place], [place] - [address] [address]  [address] [address] [address]  Phone: [phone] Fax: [phone]      Best Call Back Number: [phone]    Additional Information: roseann is asking if she still needs the blood thinner and if so please call it in to the above pharmacy ,  please call roseann to let her know if you called in blood thinners .thank you.

## 2024-04-04 ENCOUNTER — OFFICE VISIT (OUTPATIENT)
Dept: GYNECOLOGIC ONCOLOGY | Facility: CLINIC | Age: 64
End: 2024-04-04
Payer: MEDICARE

## 2024-04-04 VITALS
DIASTOLIC BLOOD PRESSURE: 75 MMHG | BODY MASS INDEX: 29.02 KG/M2 | RESPIRATION RATE: 16 BRPM | SYSTOLIC BLOOD PRESSURE: 160 MMHG | OXYGEN SATURATION: 92 % | HEART RATE: 99 BPM | WEIGHT: 180.56 LBS | TEMPERATURE: 96 F | HEIGHT: 66 IN

## 2024-04-04 DIAGNOSIS — Z51.11 ENCOUNTER FOR CHEMOTHERAPY MANAGEMENT: ICD-10-CM

## 2024-04-04 DIAGNOSIS — Z71.89 COUNSELING AND COORDINATION OF CARE: ICD-10-CM

## 2024-04-04 DIAGNOSIS — C54.1 ENDOMETRIAL CANCER: Primary | ICD-10-CM

## 2024-04-04 PROCEDURE — 3044F HG A1C LEVEL LT 7.0%: CPT | Mod: HCNC,CPTII,S$GLB, | Performed by: OBSTETRICS & GYNECOLOGY

## 2024-04-04 PROCEDURE — 3078F DIAST BP <80 MM HG: CPT | Mod: HCNC,CPTII,S$GLB, | Performed by: OBSTETRICS & GYNECOLOGY

## 2024-04-04 PROCEDURE — 1159F MED LIST DOCD IN RCRD: CPT | Mod: HCNC,CPTII,S$GLB, | Performed by: OBSTETRICS & GYNECOLOGY

## 2024-04-04 PROCEDURE — 1126F AMNT PAIN NOTED NONE PRSNT: CPT | Mod: HCNC,CPTII,S$GLB, | Performed by: OBSTETRICS & GYNECOLOGY

## 2024-04-04 PROCEDURE — 99999 PR PBB SHADOW E&M-EST. PATIENT-LVL IV: CPT | Mod: PBBFAC,HCNC,, | Performed by: OBSTETRICS & GYNECOLOGY

## 2024-04-04 PROCEDURE — 3077F SYST BP >= 140 MM HG: CPT | Mod: HCNC,CPTII,S$GLB, | Performed by: OBSTETRICS & GYNECOLOGY

## 2024-04-04 PROCEDURE — 1101F PT FALLS ASSESS-DOCD LE1/YR: CPT | Mod: HCNC,CPTII,S$GLB, | Performed by: OBSTETRICS & GYNECOLOGY

## 2024-04-04 PROCEDURE — 99024 POSTOP FOLLOW-UP VISIT: CPT | Mod: HCNC,S$GLB,, | Performed by: OBSTETRICS & GYNECOLOGY

## 2024-04-04 PROCEDURE — 3288F FALL RISK ASSESSMENT DOCD: CPT | Mod: HCNC,CPTII,S$GLB, | Performed by: OBSTETRICS & GYNECOLOGY

## 2024-04-04 NOTE — PROGRESS NOTES
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  Washings negative  ER/ME+  HER2 fish negative  P53+  MMRp     48 pretreatment    Presents today for post operative visit. Recovering appropriately from surgery. Up and about, eating, +BM.    _____________________  Pricilla Bower is a 63 y.o.  female referred by Breana Avilez NP for new diagnosis of high-grade serous endometrial adenocarcinoma. She presented to her PCP 24 with complaints of post menopausal bleeding and bulging sensation in pelvic area.     24 EMB-   High-grade serous carcinoma. ER/ME+, HER2 equivocal, p16+, P53+   HER2 fish negative    24 Pelvic US-   Uterus is retroflexed or retroverted length 10 cm. Endometrium heterogeneous appears partially cystic thickened up to 3 cm with vascular flow. Right ovary unremarkable in the left is not seen.     Today she reports occasional pelvic pressure but denies continued vaginal bleeding. She denies changes in her bowel or bladder habits. She has had SVDx2; denies any previous abdominal or pelvic surgeries. She has a family history of breast ad cervical cancer in her mother. Denies any family history of endometrial or ovarian cancer. She is up to date on Pap screening and denies history of abnormal results. She has never had colon cancer screening; is scheduled for colonoscopy in November.      Review of Systems   Constitutional:  Negative for appetite change, chills and fever.   Respiratory:  Negative for cough and shortness of breath.    Cardiovascular:  Negative for chest pain.   Gastrointestinal:  Negative for abdominal distention, abdominal pain, constipation, diarrhea and vomiting.   Genitourinary:  Negative for difficulty  urinating, pelvic pain, vaginal bleeding and vaginal discharge.    Skin: Negative.    Hematological:  Negative for adenopathy.   Psychiatric/Behavioral: Negative.        Review of patient's allergies indicates:   Allergen Reactions    Darvocet a500 [propoxyphene n-acetaminophen] Nausea And Vomiting    Penicillins      Current Outpatient Medications   Medication Instructions    amLODIPine (NORVASC) 5 mg, Oral    aspirin 81 mg, Oral, Daily    atorvastatin (LIPITOR) 40 mg, Oral, Nightly    betamethasone dipropionate (DIPROLENE) 0.05 % ointment Topical (Top), 2 times daily    blood sugar diagnostic Strp To check BG 2 times daily, to use with insurance preferred meter    blood-glucose meter kit To check BG 2 times daily, to use with insurance preferred meter/Accu-Check Guide ME    ciclopirox (PENLAC) 8 % Soln Apply to fingernails and adjacent skin once daily in combination with weekly nail trimming. Remove with alcohol every 7 days; continue therapy until nail clears    cyclobenzaprine (FLEXERIL) 5 mg, Oral, 2 times daily PRN, Will cause drowsiness    diclofenac (VOLTAREN) 50 mg, Oral, 2 times daily PRN, Take with food    gabapentin (NEURONTIN) 300 mg, Oral, 3 times daily    ibuprofen (ADVIL,MOTRIN) 600 mg, Oral, Every 8 hours PRN    ketoconazole (NIZORAL) 2 % cream Topical (Top), 2 times daily, Continue use for 1 week after resolution of fungal rash.    ketoconazole (NIZORAL) 2 % shampoo Topical (Top), Twice weekly    lancets Misc To check BG 2 times daily, to use with insurance preferred meter    latanoprost 0.005 % ophthalmic solution 1 drop, Both Eyes, Nightly    metFORMIN (GLUCOPHAGE) 1,000 mg, Oral, With breakfast    MOUNJARO 2.5 mg, Subcutaneous, Every Thursday    oxyCODONE-acetaminophen (PERCOCET) 5-325 mg per tablet 1 tablet, Oral, Every 6 hours PRN    timolol maleate 0.5% (TIMOPTIC) 0.5 % Drop 1 drop, Both Eyes, Every morning     Past Medical History:   Diagnosis Date    Anticoagulant long-term use     Diabetes  mellitus, type 2 2010    A1c >14% on 20 & 11% on 3/8/19; Lost to F/U as of 2019; to ER for glu >500 at [facility] 20    Endometrial cancer 2024    Hyperlipidemia     Hypertension     Noncompliance with treatment plan     Uncontrolled DM2 due to noncompliance with insulin and follow up; A1c 11% on 3/8/19; Lost to F/U as of 2019; to ER for glu >500 at [facility] 20 -> A1c >14% on 20 -> rec f/u in 1 wk with FBG log to demontrate understanding of insulin titration    Obesity (BMI 30.0-34.9)     Vitamin D deficiency       Past Surgical History:   Procedure Laterality Date    MAPPING, LYMPH NODE, SENTINEL Bilateral 3/18/2024    Procedure: MAPPING, LYMPH NODE, SENTINEL;  Surgeon: Siria Goldberg MD;  Location: [facility] OR;  Service: OB/GYN;  Laterality: Bilateral;    NO PAST SURGERIES      ROBOT-ASSISTED LAPAROSCOPIC ABDOMINAL HYSTERECTOMY USING DA VIKTORIYA XI N/A 3/18/2024    Procedure: XI ROBOTIC HYSTERECTOMY;  Surgeon: Siria Goldberg MD;  Location: [facility] OR;  Service: OB/GYN;  Laterality: N/A;    ROBOT-ASSISTED LAPAROSCOPIC PELVIC LYMPHADENECTOMY USING DA VIKTORIYA XI Bilateral 3/18/2024    Procedure: XI ROBOTIC LYMPHADENECTOMY, PELVIC;  Surgeon: Siria Goldberg MD;  Location: [facility] OR;  Service: OB/GYN;  Laterality: Bilateral;    ROBOT-ASSISTED LAPAROSCOPIC SALPINGO-OOPHORECTOMY USING DA VIKTORIYA XI Bilateral 3/18/2024    Procedure: XI ROBOTIC SALPINGO-OOPHORECTOMY;  Surgeon: Siria Goldberg MD;  Location: [facility] OR;  Service: OB/GYN;  Laterality: Bilateral;      OB History    Para Term  AB Living   3 2 2   1     SAB IAB Ectopic Multiple Live Births   1              # Outcome Date GA Lbr Michael/2nd Weight Sex Delivery Anes PTL Lv   3 SAB            2 Term            1 Term              Social History     Tobacco Use    Smoking status: Never     Passive exposure: Never    Smokeless tobacco: Never   Substance Use Topics    Alcohol use: No    Drug use: No      Family History   Problem Relation Age of  "Onset    Breast cancer Mother     Cervical cancer Mother      Health Maintenance Topics with due status: Not Due       Topic Last Completion Date    Diabetes Urine Screening 06/07/2023    Lipid Panel 06/07/2023    Eye Exam 08/02/2023    Hemoglobin A1c 01/22/2024    Foot Exam 02/13/2024    Low Dose Statin 04/04/2024     Health Maintenance Due   Topic Date Due    COVID-19 Vaccine (1) Never done    Pneumococcal Vaccines (Age 65+) (1 of 2 - PCV) Never done    TETANUS VACCINE  Never done    Shingles Vaccine (1 of 2) Never done    DEXA Scan  Never done    Colorectal Cancer Screening  Never done    RSV Vaccine (Age 60+ and Pregnant patients) (1 - 1-dose 60+ series) Never done    Influenza Vaccine (1) Never done    Mammogram  06/14/2024     OBJECTIVE     BP (!) 160/75 (BP Location: Right arm, Patient Position: Sitting, BP Method: Medium (Automatic)) Comment: patient has not taken bp meds  Pulse 99   Temp 96 °F (35.6 °C) (Temporal)   Resp 16   Ht 5' 5.5" (1.664 m)   Wt 81.9 kg (180 lb 8.9 oz)   SpO2 (!) 92%   BMI 29.59 kg/m²     Physical Exam  Constitutional:       Appearance: Normal appearance.   HENT:      Nose: Nose normal.   Cardiovascular:      Rate and Rhythm: Normal rate.   Abdominal:      General: Abdomen is flat.      Palpations: Abdomen is soft.   Musculoskeletal:         General: Normal range of motion.   Neurological:      General: No focal deficit present.      Mental Status: She is alert. Mental status is at baseline.   Skin:     General: Skin is dry.   Psychiatric:         Mood and Affect: Mood normal.         Behavior: Behavior normal.       ASSESSMENT     1. Endometrial cancer    2. Counseling and coordination of care        PLAN     Recovering appropriately from surgery.   Reviewed pathology and staging in detail.     There are multiple options for adjuvant therapy intended to reduce risk of recurrence for patients with high -risk disease and I had an extended conversation with Ms. Bower regarding " pelvic radiation, chemotherapy alone, or chemotherapy sequenced either before or after radiation. I discussed  and PORTEC 3 trials which randomized high risk patients to some of the interventions noted. Her tumor is HER2 negative and MMRp.  Through shared decision making, we will move forward with systemic platinum based chemotherapy +/- EBRT +/- VCBT.      Continue post operative care.   Chemotherapy planning.

## 2024-04-05 ENCOUNTER — TELEPHONE (OUTPATIENT)
Dept: HEMATOLOGY/ONCOLOGY | Facility: CLINIC | Age: 64
End: 2024-04-05
Payer: MEDICARE

## 2024-04-16 NOTE — NURSING
Spoke with pt.  Introduced myself and explained my role in her care.  Briefly reviewed chemo class & what to expect.  She reviewed her work schedule and chemo class is scheduled for Thursday beginning at 9am. Location and contact information reviewed.  Asked her to call with any questions or concerns.  She verbalized understanding.

## 2024-04-18 ENCOUNTER — DOCUMENTATION ONLY (OUTPATIENT)
Dept: INFUSION THERAPY | Facility: HOSPITAL | Age: 64
End: 2024-04-18
Payer: MEDICARE

## 2024-04-18 ENCOUNTER — OFFICE VISIT (OUTPATIENT)
Dept: GYNECOLOGIC ONCOLOGY | Facility: CLINIC | Age: 64
End: 2024-04-18
Payer: MEDICARE

## 2024-04-18 ENCOUNTER — TELEPHONE (OUTPATIENT)
Dept: HEMATOLOGY/ONCOLOGY | Facility: CLINIC | Age: 64
End: 2024-04-18
Payer: MEDICARE

## 2024-04-18 VITALS
BODY MASS INDEX: 28.74 KG/M2 | RESPIRATION RATE: 16 BRPM | HEIGHT: 66 IN | TEMPERATURE: 96 F | WEIGHT: 178.81 LBS | HEART RATE: 74 BPM | OXYGEN SATURATION: 98 % | SYSTOLIC BLOOD PRESSURE: 139 MMHG | DIASTOLIC BLOOD PRESSURE: 83 MMHG

## 2024-04-18 DIAGNOSIS — Z20.822 ENCOUNTER FOR LABORATORY TESTING FOR COVID-19 VIRUS: ICD-10-CM

## 2024-04-18 DIAGNOSIS — C54.1 PRIMARY SEROUS ADENOCARCINOMA OF ENDOMETRIUM: Primary | ICD-10-CM

## 2024-04-18 DIAGNOSIS — Z51.11 ENCOUNTER FOR CHEMOTHERAPY MANAGEMENT: ICD-10-CM

## 2024-04-18 DIAGNOSIS — Z71.9 ENCOUNTER FOR EDUCATION: ICD-10-CM

## 2024-04-18 PROCEDURE — 1101F PT FALLS ASSESS-DOCD LE1/YR: CPT | Mod: CPTII,S$GLB,,

## 2024-04-18 PROCEDURE — 3008F BODY MASS INDEX DOCD: CPT | Mod: CPTII,S$GLB,,

## 2024-04-18 PROCEDURE — 3044F HG A1C LEVEL LT 7.0%: CPT | Mod: CPTII,S$GLB,,

## 2024-04-18 PROCEDURE — 3288F FALL RISK ASSESSMENT DOCD: CPT | Mod: CPTII,S$GLB,,

## 2024-04-18 PROCEDURE — 99999 PR PBB SHADOW E&M-EST. PATIENT-LVL V: CPT | Mod: PBBFAC,,,

## 2024-04-18 PROCEDURE — 1159F MED LIST DOCD IN RCRD: CPT | Mod: CPTII,S$GLB,,

## 2024-04-18 PROCEDURE — 99215 OFFICE O/P EST HI 40 MIN: CPT | Mod: 24,S$GLB,,

## 2024-04-18 PROCEDURE — 3079F DIAST BP 80-89 MM HG: CPT | Mod: CPTII,S$GLB,,

## 2024-04-18 PROCEDURE — 1126F AMNT PAIN NOTED NONE PRSNT: CPT | Mod: CPTII,S$GLB,,

## 2024-04-18 PROCEDURE — 3075F SYST BP GE 130 - 139MM HG: CPT | Mod: CPTII,S$GLB,,

## 2024-04-18 NOTE — PROGRESS NOTES
SUBJECTIVE     Chief complaint: Chemotherapy    History of present Illness:  Pricilla Bower is a 63 y.o. 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 Her oncologist, Dr. Goldberg, recommended treatment with Paclitaxel/Carboplatin and the patient has agreed to proceed.      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  Washings negative  ER/ND+  HER2 fish negative  P53+  MMRp      48 pretreatment    Review of Systems   Constitutional: Negative.  Negative for appetite change, fatigue and fever.   HENT:  Negative.     Eyes: Negative.    Respiratory: Negative.  Negative for cough and shortness of breath.    Cardiovascular: Negative.  Negative for chest pain.   Gastrointestinal: Negative.  Negative for abdominal distention, abdominal pain, constipation, diarrhea and nausea.   Endocrine: Negative.    Genitourinary: Negative.  Negative for difficulty urinating, pelvic pain, vaginal bleeding and vaginal discharge.    Musculoskeletal: Negative.    Skin: Negative.    Neurological: Negative.    Hematological: Negative.  Negative for adenopathy.   Psychiatric/Behavioral: Negative.     All other systems reviewed and are negative.       Review of patient's allergies indicates:   Allergen Reactions    Darvocet a500 [propoxyphene n-acetaminophen] Nausea And Vomiting    Penicillins      Current Outpatient Medications   Medication Instructions    amLODIPine (NORVASC) 5 mg, Oral    aspirin 81 mg, Oral, Daily    atorvastatin (LIPITOR) 40 mg, Oral, Nightly    betamethasone dipropionate (DIPROLENE) 0.05 % ointment Topical (Top), 2 times daily    blood sugar diagnostic Strp To check BG 2 times daily, to use with insurance preferred meter    blood-glucose meter kit To  check BG 2 times daily, to use with insurance preferred meter/Accu-Check Guide ME    ciclopirox (PENLAC) 8 % Soln Apply to fingernails and adjacent skin once daily in combination with weekly nail trimming. Remove with alcohol every 7 days; continue therapy until nail clears    cyclobenzaprine (FLEXERIL) 5 mg, Oral, 2 times daily PRN, Will cause drowsiness    diclofenac (VOLTAREN) 50 mg, Oral, 2 times daily PRN, Take with food    gabapentin (NEURONTIN) 300 mg, Oral, 3 times daily    ibuprofen (ADVIL,MOTRIN) 600 mg, Oral, Every 8 hours PRN    ketoconazole (NIZORAL) 2 % cream Topical (Top), 2 times daily, Continue use for 1 week after resolution of fungal rash.    ketoconazole (NIZORAL) 2 % shampoo Topical (Top), Twice weekly    lancets Misc To check BG 2 times daily, to use with insurance preferred meter    latanoprost 0.005 % ophthalmic solution 1 drop, Both Eyes, Nightly    metFORMIN (GLUCOPHAGE) 1,000 mg, Oral, With breakfast    MOUNJARO 2.5 mg, Subcutaneous, Every Thursday    oxyCODONE-acetaminophen (PERCOCET) 5-325 mg per tablet 1 tablet, Oral, Every 6 hours PRN    timolol maleate 0.5% (TIMOPTIC) 0.5 % Drop 1 drop, Both Eyes, Every morning     Past Medical History:   Diagnosis Date    Anticoagulant long-term use     Diabetes mellitus, type 2 2010    A1c >14% on 1/29/20 & 11% on 3/8/19; Lost to F/U as of 7/2019; to ER for glu >500 at [facility] 1/23/20    Endometrial cancer 02/2024    Hyperlipidemia     Hypertension     Noncompliance with treatment plan     Uncontrolled DM2 due to noncompliance with insulin and follow up; A1c 11% on 3/8/19; Lost to F/U as of 7/2019; to ER for glu >500 at [facility] 1/23/20 -> A1c >14% on 1/29/20 -> rec f/u in 1 wk with FBG log to demontrate understanding of insulin titration    Obesity (BMI 30.0-34.9)     Vitamin D deficiency       Past Surgical History:   Procedure Laterality Date    MAPPING, LYMPH NODE, SENTINEL Bilateral 3/18/2024    Procedure: MAPPING, LYMPH NODE,  SENTINEL;  Surgeon: Siria Goldberg MD;  Location: [facility] OR;  Service: OB/GYN;  Laterality: Bilateral;    NO PAST SURGERIES      ROBOT-ASSISTED LAPAROSCOPIC ABDOMINAL HYSTERECTOMY USING DA VIKTORIYA XI N/A 3/18/2024    Procedure: XI ROBOTIC HYSTERECTOMY;  Surgeon: Siria Goldberg MD;  Location: [facility] OR;  Service: OB/GYN;  Laterality: N/A;    ROBOT-ASSISTED LAPAROSCOPIC PELVIC LYMPHADENECTOMY USING DA VIKTORIYA XI Bilateral 3/18/2024    Procedure: XI ROBOTIC LYMPHADENECTOMY, PELVIC;  Surgeon: Siria Goldberg MD;  [facility];  Service: OB/GYN;  Laterality: Bilateral;    ROBOT-ASSISTED LAPAROSCOPIC SALPINGO-OOPHORECTOMY USING DA VIKTORIYA XI Bilateral 3/18/2024    Procedure: XI ROBOTIC SALPINGO-OOPHORECTOMY;  Surgeon: Siria Goldberg MD;  Location: [facility] OR;  Service: OB/GYN;  Laterality: Bilateral;      OB History    Para Term  AB Living   3 2 2   1     SAB IAB Ectopic Multiple Live Births   1              # Outcome Date GA Lbr Michael/2nd Weight Sex Type Anes PTL Lv   3 SAB            2 Term            1 Term              Social History     Tobacco Use    Smoking status: Never     Passive exposure: Never    Smokeless tobacco: Never   Substance Use Topics    Alcohol use: No    Drug use: No      Family History   Problem Relation Name Age of Onset    Breast cancer Mother      Cervical cancer Mother       Health Maintenance Topics with due status: Not Due       Topic Last Completion Date    Diabetes Urine Screening 2023    Lipid Panel 2023    Eye Exam 2023    Hemoglobin A1c 2024    Foot Exam 2024    Low Dose Statin 2024     Health Maintenance Due   Topic Date Due    COVID-19 Vaccine (1) Never done    Pneumococcal Vaccines (Age 65+) (1 of 2 - PCV) Never done    TETANUS VACCINE  Never done    Shingles Vaccine (1 of 2) Never done    DEXA Scan  Never done    Colorectal Cancer Screening  Never done    RSV Vaccine (Age 60+ and Pregnant patients) (1 - 1-dose 60+  "series) Never done    Influenza Vaccine (1) Never done    Mammogram  06/14/2024     OBJECTIVE     /83 (BP Location: Right arm, Patient Position: Sitting, BP Method: Medium (Automatic))   Pulse 74   Temp 96 °F (35.6 °C) (Temporal)   Resp 16   Ht 5' 5.5" (1.664 m)   Wt 81.1 kg (178 lb 12.7 oz)   SpO2 98%   BMI 29.30 kg/m²     Physical Exam  Constitutional:       General: She is awake.      Appearance: Normal appearance. She is not ill-appearing.   Neurological:      Mental Status: She is alert.   Psychiatric:         Behavior: Behavior normal. Behavior is cooperative.      ASSESSMENT     1. Primary serous adenocarcinoma of endometrium  - Ambulatory referral/consult to Chemo School  - Ambulatory referral/consult to Oncology Social Work  - Ambulatory referral/consult to Hematology/Oncology/Nutrition    2. Encounter for education    3. Encounter for chemotherapy management  - Ambulatory referral/consult to Chemo School  - Ambulatory referral/consult to Oncology Social Work  - Ambulatory referral/consult to Hematology/Oncology/Nutrition    4. Encounter for laboratory testing for COVID-19 virus  - COVID-19 Routine Screening; Future    PLAN   Treatment plan of Paclitaxel/Carboplatin every 21 days x 6 cycles discussed with patient. Reviewed schedule and provided calendar with appointments.   Handouts provided on chemotherapy agents. Premeds, supportive meds explained.  Discussed the mechanism of action, potential side effects of this treatment as well as ways to manage them at home.   Dietary modifications discussed. Detail instructions to be provided by dietician.   Chemotherapy folder supplied with contact information.   Discussed follow-up with the physician for toxicity monitoring throughout treatment.    Cycle 1, Day 1 scheduled for 4/25/24  Pre-chemotherapy labs and COVID test to be scheduled prior to 1st infusion.   Consented the patient to the treatment plan and the patient was educated on the planned " duration of the treatment and schedule of the treatment administration.  Encouraged to call office - phone number provided - to assist with any concerns.    60 minutes were spent in coordination of patient's care, record review and counseling.

## 2024-04-18 NOTE — PROGRESS NOTES
Oncology   Chemotherapy School Education  Pricilla Bower   1960    Social Service Education   This is a 63 y.o.female with a medical diagnosis of endometrial cancer.    Current Support System: ,     Met with pt for new pt education. Reviewed role of  during cancer treatment. Educated on role of SW on care team and resources available at the cancer center.     Answered all psychosocial/socioeconomic related questions.     Patient provided with social work contact number and advised to call with questions or make future appointment if further intervention is needed. SW to follow throughout tx.    Arlene Estrada LCSW  04/18/2024  11:10 AM

## 2024-04-18 NOTE — PROGRESS NOTES
Oncology Nutrition   New Patient Education  Pricilla Bower   1960    Nutrition Education   This is a 63 y.o.female with a medical diagnosis of endometrium cancer.     Met w/ pt and her  to discuss current nutritional status and nutrition as it relates to cancer and cancer treatment. Pt currently low nutrition risk.    Wt Readings from Last 10 Encounters:   04/18/24 81.1 kg (178 lb 12.7 oz)   04/04/24 81.9 kg (180 lb 8.9 oz)   03/18/24 80.3 kg (177 lb)   03/15/24 80.6 kg (177 lb 11.1 oz)   03/03/24 80.7 kg (178 lb)   02/23/24 81.1 kg (178 lb 12.7 oz)   02/13/24 82.1 kg (181 lb)   02/07/24 82.2 kg (181 lb 3.5 oz)   02/02/24 81.3 kg (179 lb 3.7 oz)   01/22/24 81.8 kg (180 lb 3.6 oz)      [x] PMHx reviewed  [x] Labs reviewed    Educated on food safety and common nutrition impact symptoms associated with chemotherapy treatment. Reinforced the importance of good hydration. Handouts provided.    Answered all nutrition related questions.     Patient provided with dietitian contact number and advised to call with questions or make future appointment if further intervention is needed. RD to follow throughout tx PRN.    Blessing Garcia, MS, RD, LDN  04/18/2024  2:12 PM

## 2024-04-24 ENCOUNTER — TELEPHONE (OUTPATIENT)
Dept: HEMATOLOGY/ONCOLOGY | Facility: CLINIC | Age: 64
End: 2024-04-24
Payer: MEDICARE

## 2024-04-24 ENCOUNTER — LAB VISIT (OUTPATIENT)
Dept: LAB | Facility: HOSPITAL | Age: 64
End: 2024-04-24
Attending: OBSTETRICS & GYNECOLOGY
Payer: MEDICARE

## 2024-04-24 DIAGNOSIS — C54.1 ENDOMETRIAL CANCER: ICD-10-CM

## 2024-04-24 DIAGNOSIS — Z51.11 ENCOUNTER FOR CHEMOTHERAPY MANAGEMENT: ICD-10-CM

## 2024-04-24 DIAGNOSIS — C54.1 ENDOMETRIAL CANCER: Primary | ICD-10-CM

## 2024-04-24 PROBLEM — Z01.818 EXAMINATION PRIOR TO CHEMOTHERAPY: Status: ACTIVE | Noted: 2024-04-24

## 2024-04-24 LAB
ALBUMIN SERPL BCP-MCNC: 4 G/DL (ref 3.5–5.2)
ALP SERPL-CCNC: 68 U/L (ref 55–135)
ALT SERPL W/O P-5'-P-CCNC: 24 U/L (ref 10–44)
ANION GAP SERPL CALC-SCNC: 8 MMOL/L (ref 8–16)
AST SERPL-CCNC: 19 U/L (ref 10–40)
BILIRUB SERPL-MCNC: 0.3 MG/DL (ref 0.1–1)
BUN SERPL-MCNC: 9 MG/DL (ref 8–23)
CALCIUM SERPL-MCNC: 9.7 MG/DL (ref 8.7–10.5)
CHLORIDE SERPL-SCNC: 104 MMOL/L (ref 95–110)
CO2 SERPL-SCNC: 29 MMOL/L (ref 23–29)
CREAT SERPL-MCNC: 0.8 MG/DL (ref 0.5–1.4)
ERYTHROCYTE [DISTWIDTH] IN BLOOD BY AUTOMATED COUNT: 14.4 % (ref 11.5–14.5)
EST. GFR  (NO RACE VARIABLE): >60 ML/MIN/1.73 M^2
GLUCOSE SERPL-MCNC: 151 MG/DL (ref 70–110)
HCT VFR BLD AUTO: 38.1 % (ref 37–48.5)
HGB BLD-MCNC: 12.2 G/DL (ref 12–16)
IMM GRANULOCYTES # BLD AUTO: 0.01 K/UL (ref 0–0.04)
MCH RBC QN AUTO: 28.6 PG (ref 27–31)
MCHC RBC AUTO-ENTMCNC: 32 G/DL (ref 32–36)
MCV RBC AUTO: 89 FL (ref 82–98)
NEUTROPHILS # BLD AUTO: 4.1 K/UL (ref 1.8–7.7)
PLATELET # BLD AUTO: 313 K/UL (ref 150–450)
PMV BLD AUTO: 11.3 FL (ref 9.2–12.9)
POTASSIUM SERPL-SCNC: 3.9 MMOL/L (ref 3.5–5.1)
PROT SERPL-MCNC: 7.1 G/DL (ref 6–8.4)
RBC # BLD AUTO: 4.27 M/UL (ref 4–5.4)
SODIUM SERPL-SCNC: 141 MMOL/L (ref 136–145)
WBC # BLD AUTO: 6.25 K/UL (ref 3.9–12.7)

## 2024-04-24 PROCEDURE — 80053 COMPREHEN METABOLIC PANEL: CPT | Performed by: OBSTETRICS & GYNECOLOGY

## 2024-04-24 PROCEDURE — 36415 COLL VENOUS BLD VENIPUNCTURE: CPT | Mod: PN | Performed by: OBSTETRICS & GYNECOLOGY

## 2024-04-24 PROCEDURE — 85027 COMPLETE CBC AUTOMATED: CPT | Performed by: OBSTETRICS & GYNECOLOGY

## 2024-04-24 PROCEDURE — 86304 IMMUNOASSAY TUMOR CA 125: CPT | Performed by: OBSTETRICS & GYNECOLOGY

## 2024-04-24 NOTE — PROGRESS NOTES
SUBJECTIVE     Chief complaint: No chief complaint on file.    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  Washings negative  ER/DE+  HER2 fish negative  P53+  MMRp     48 pretreatment    Discussed adjuvant chemotherapy with carbo/taxol    C1 - 2024    Today's visit  Presents today for evaluation prior to cycle #1 Paclitaxel/Carboplatin. Labs reviewed and appropriate for treatment. Continues to recover appropriately from surgery. Up and about, eating, +BM.      18    Referral History  Pricilla Bower is a 63 y.o.  female referred by Breana Avilez NP for new diagnosis of high-grade serous endometrial adenocarcinoma. She presented to her PCP 24 with complaints of post menopausal bleeding and bulging sensation in pelvic area.     24 EMB-   High-grade serous carcinoma. ER/DE+, HER2 equivocal, p16+, P53+   HER2 fish negative    24 Pelvic US-   Uterus is retroflexed or retroverted length 10 cm. Endometrium heterogeneous appears partially cystic thickened up to 3 cm with vascular flow. Right ovary unremarkable in the left is not seen.     Today she reports occasional pelvic pressure but denies continued vaginal bleeding. She denies changes in her bowel or bladder habits. She has had SVDx2; denies any previous abdominal or pelvic surgeries. She has a family history of breast ad cervical cancer in her mother. Denies any family history of endometrial or ovarian cancer. She is up to date on Pap screening and denies history of abnormal results. She has never had colon cancer screening; is scheduled for colonoscopy in November.      Review of Systems   Constitutional:  Negative for appetite change, chills and fever.   Respiratory:  Negative for cough and  shortness of breath.    Cardiovascular:  Negative for chest pain.   Gastrointestinal:  Negative for abdominal distention, abdominal pain, constipation, diarrhea and vomiting.   Genitourinary:  Negative for difficulty urinating, pelvic pain, vaginal bleeding and vaginal discharge.    Skin: Negative.    Hematological:  Negative for adenopathy.   Psychiatric/Behavioral: Negative.       OBJECTIVE     There were no vitals taken for this visit.    Physical Exam  Constitutional:       Appearance: Normal appearance.   HENT:      Nose: Nose normal.   Cardiovascular:      Rate and Rhythm: Normal rate.   Abdominal:      General: Abdomen is flat.      Palpations: Abdomen is soft.   Musculoskeletal:         General: Normal range of motion.   Neurological:      General: No focal deficit present.      Mental Status: She is alert. Mental status is at baseline.   Skin:     General: Skin is dry.   Psychiatric:         Mood and Affect: Mood normal.         Behavior: Behavior normal.       ASSESSMENT     1. Primary serous adenocarcinoma of endometrium    2. Examination prior to chemotherapy    PLAN     Okay to treat C1 as above   RTC 3 weeks prechemo or sooner if needed.     I spent approximately 45 minutes reviewing the available records and evaluating the patient, out of which over 50% of the time was spent face to face with the patient in counseling and coordinating this patient's care.

## 2024-04-24 NOTE — TELEPHONE ENCOUNTER
I spoke with the patient about getting an IO appt scheduled per referral. I explained in detail what we offer and listed some symptoms/side effects that we can help address using our support services. They verbalized understanding and accepted a date/time. Location was reviewed and a message was sent to the portal if they had any follow up questions.

## 2024-04-25 ENCOUNTER — DOCUMENTATION ONLY (OUTPATIENT)
Dept: INFUSION THERAPY | Facility: HOSPITAL | Age: 64
End: 2024-04-25
Payer: MEDICARE

## 2024-04-25 ENCOUNTER — INFUSION (OUTPATIENT)
Dept: INFUSION THERAPY | Facility: HOSPITAL | Age: 64
End: 2024-04-25
Attending: OBSTETRICS & GYNECOLOGY
Payer: MEDICARE

## 2024-04-25 ENCOUNTER — OFFICE VISIT (OUTPATIENT)
Dept: GYNECOLOGIC ONCOLOGY | Facility: CLINIC | Age: 64
End: 2024-04-25
Payer: MEDICARE

## 2024-04-25 VITALS
OXYGEN SATURATION: 99 % | HEART RATE: 74 BPM | RESPIRATION RATE: 16 BRPM | HEIGHT: 66 IN | DIASTOLIC BLOOD PRESSURE: 78 MMHG | BODY MASS INDEX: 28.98 KG/M2 | TEMPERATURE: 97 F | WEIGHT: 180.31 LBS | SYSTOLIC BLOOD PRESSURE: 138 MMHG

## 2024-04-25 VITALS
DIASTOLIC BLOOD PRESSURE: 62 MMHG | RESPIRATION RATE: 16 BRPM | HEART RATE: 91 BPM | WEIGHT: 180.31 LBS | BODY MASS INDEX: 28.98 KG/M2 | TEMPERATURE: 97 F | SYSTOLIC BLOOD PRESSURE: 109 MMHG | OXYGEN SATURATION: 99 % | HEIGHT: 66 IN

## 2024-04-25 DIAGNOSIS — Z03.818 ENCNTR FOR OBS FOR SUSP EXPSR TO OTH BIOLG AGENTS RULED OUT: Primary | ICD-10-CM

## 2024-04-25 DIAGNOSIS — C54.1 PRIMARY SEROUS ADENOCARCINOMA OF ENDOMETRIUM: Primary | ICD-10-CM

## 2024-04-25 DIAGNOSIS — C54.1 PRIMARY SEROUS ADENOCARCINOMA OF ENDOMETRIUM: ICD-10-CM

## 2024-04-25 DIAGNOSIS — Z01.818 EXAMINATION PRIOR TO CHEMOTHERAPY: ICD-10-CM

## 2024-04-25 LAB
CANCER AG125 SERPL-ACNC: 18 U/ML (ref 0–30)
SARS-COV-2 RDRP RESP QL NAA+PROBE: NEGATIVE

## 2024-04-25 PROCEDURE — 99999 PR PBB SHADOW E&M-EST. PATIENT-LVL IV: CPT | Mod: PBBFAC,,, | Performed by: OBSTETRICS & GYNECOLOGY

## 2024-04-25 PROCEDURE — 99215 OFFICE O/P EST HI 40 MIN: CPT | Mod: 24,S$GLB,, | Performed by: OBSTETRICS & GYNECOLOGY

## 2024-04-25 PROCEDURE — 1126F AMNT PAIN NOTED NONE PRSNT: CPT | Mod: CPTII,S$GLB,, | Performed by: OBSTETRICS & GYNECOLOGY

## 2024-04-25 PROCEDURE — 3075F SYST BP GE 130 - 139MM HG: CPT | Mod: CPTII,S$GLB,, | Performed by: OBSTETRICS & GYNECOLOGY

## 2024-04-25 PROCEDURE — 96417 CHEMO IV INFUS EACH ADDL SEQ: CPT | Mod: PN

## 2024-04-25 PROCEDURE — 3288F FALL RISK ASSESSMENT DOCD: CPT | Mod: CPTII,S$GLB,, | Performed by: OBSTETRICS & GYNECOLOGY

## 2024-04-25 PROCEDURE — 96415 CHEMO IV INFUSION ADDL HR: CPT | Mod: PN

## 2024-04-25 PROCEDURE — 25000003 PHARM REV CODE 250: Mod: PN | Performed by: OBSTETRICS & GYNECOLOGY

## 2024-04-25 PROCEDURE — 3008F BODY MASS INDEX DOCD: CPT | Mod: CPTII,S$GLB,, | Performed by: OBSTETRICS & GYNECOLOGY

## 2024-04-25 PROCEDURE — U0002 COVID-19 LAB TEST NON-CDC: HCPCS | Mod: PN | Performed by: OBSTETRICS & GYNECOLOGY

## 2024-04-25 PROCEDURE — 63600175 PHARM REV CODE 636 W HCPCS: Mod: PN | Performed by: OBSTETRICS & GYNECOLOGY

## 2024-04-25 PROCEDURE — 1101F PT FALLS ASSESS-DOCD LE1/YR: CPT | Mod: CPTII,S$GLB,, | Performed by: OBSTETRICS & GYNECOLOGY

## 2024-04-25 PROCEDURE — 96413 CHEMO IV INFUSION 1 HR: CPT | Mod: PN

## 2024-04-25 PROCEDURE — 3078F DIAST BP <80 MM HG: CPT | Mod: CPTII,S$GLB,, | Performed by: OBSTETRICS & GYNECOLOGY

## 2024-04-25 PROCEDURE — 1159F MED LIST DOCD IN RCRD: CPT | Mod: CPTII,S$GLB,, | Performed by: OBSTETRICS & GYNECOLOGY

## 2024-04-25 PROCEDURE — 96375 TX/PRO/DX INJ NEW DRUG ADDON: CPT | Mod: PN

## 2024-04-25 PROCEDURE — 3044F HG A1C LEVEL LT 7.0%: CPT | Mod: CPTII,S$GLB,, | Performed by: OBSTETRICS & GYNECOLOGY

## 2024-04-25 PROCEDURE — 96367 TX/PROPH/DG ADDL SEQ IV INF: CPT | Mod: PN

## 2024-04-25 RX ORDER — PROCHLORPERAZINE MALEATE 10 MG
10 TABLET ORAL EVERY 6 HOURS PRN
Qty: 30 TABLET | Refills: 1 | Status: SHIPPED | OUTPATIENT
Start: 2024-04-25 | End: 2025-04-25

## 2024-04-25 RX ORDER — EPINEPHRINE 0.3 MG/.3ML
0.3 INJECTION SUBCUTANEOUS ONCE AS NEEDED
Status: CANCELLED | OUTPATIENT
Start: 2024-04-25

## 2024-04-25 RX ORDER — FAMOTIDINE 10 MG/ML
20 INJECTION INTRAVENOUS
Status: CANCELLED | OUTPATIENT
Start: 2024-04-25

## 2024-04-25 RX ORDER — DEXAMETHASONE 4 MG/1
8 TABLET ORAL DAILY
Qty: 120 TABLET | Refills: 0 | Status: SHIPPED | OUTPATIENT
Start: 2024-04-25 | End: 2024-04-26

## 2024-04-25 RX ORDER — PROCHLORPERAZINE EDISYLATE 5 MG/ML
5 INJECTION INTRAMUSCULAR; INTRAVENOUS ONCE AS NEEDED
Status: CANCELLED | OUTPATIENT
Start: 2024-04-25

## 2024-04-25 RX ORDER — FAMOTIDINE 10 MG/ML
20 INJECTION INTRAVENOUS
Status: COMPLETED | OUTPATIENT
Start: 2024-04-25 | End: 2024-04-25

## 2024-04-25 RX ORDER — DIPHENHYDRAMINE HYDROCHLORIDE 50 MG/ML
50 INJECTION INTRAMUSCULAR; INTRAVENOUS ONCE AS NEEDED
Status: DISCONTINUED | OUTPATIENT
Start: 2024-04-25 | End: 2024-04-25 | Stop reason: HOSPADM

## 2024-04-25 RX ORDER — HEPARIN 100 UNIT/ML
500 SYRINGE INTRAVENOUS
Status: DISCONTINUED | OUTPATIENT
Start: 2024-04-25 | End: 2024-04-25 | Stop reason: HOSPADM

## 2024-04-25 RX ORDER — PROCHLORPERAZINE EDISYLATE 5 MG/ML
5 INJECTION INTRAMUSCULAR; INTRAVENOUS ONCE AS NEEDED
Status: DISCONTINUED | OUTPATIENT
Start: 2024-04-25 | End: 2024-04-25 | Stop reason: HOSPADM

## 2024-04-25 RX ORDER — SODIUM CHLORIDE 0.9 % (FLUSH) 0.9 %
10 SYRINGE (ML) INJECTION
Status: DISCONTINUED | OUTPATIENT
Start: 2024-04-25 | End: 2024-04-25 | Stop reason: HOSPADM

## 2024-04-25 RX ORDER — HEPARIN 100 UNIT/ML
500 SYRINGE INTRAVENOUS
Status: CANCELLED | OUTPATIENT
Start: 2024-04-25

## 2024-04-25 RX ORDER — EPINEPHRINE 0.3 MG/.3ML
0.3 INJECTION SUBCUTANEOUS ONCE AS NEEDED
Status: DISCONTINUED | OUTPATIENT
Start: 2024-04-25 | End: 2024-04-25 | Stop reason: HOSPADM

## 2024-04-25 RX ORDER — SODIUM CHLORIDE 0.9 % (FLUSH) 0.9 %
10 SYRINGE (ML) INJECTION
Status: CANCELLED | OUTPATIENT
Start: 2024-04-25

## 2024-04-25 RX ORDER — DIPHENHYDRAMINE HYDROCHLORIDE 50 MG/ML
50 INJECTION INTRAMUSCULAR; INTRAVENOUS ONCE AS NEEDED
Status: CANCELLED | OUTPATIENT
Start: 2024-04-25

## 2024-04-25 RX ADMIN — FAMOTIDINE 20 MG: 10 INJECTION INTRAVENOUS at 10:04

## 2024-04-25 RX ADMIN — DEXAMETHASONE SODIUM PHOSPHATE 0.25 MG: 10 INJECTION, SOLUTION INTRAMUSCULAR; INTRAVENOUS at 10:04

## 2024-04-25 RX ADMIN — APREPITANT 130 MG: 130 INJECTION, EMULSION INTRAVENOUS at 10:04

## 2024-04-25 RX ADMIN — CARBOPLATIN 695 MG: 10 INJECTION INTRAVENOUS at 02:04

## 2024-04-25 RX ADMIN — DIPHENHYDRAMINE HYDROCHLORIDE 50 MG: 50 INJECTION, SOLUTION INTRAMUSCULAR; INTRAVENOUS at 10:04

## 2024-04-25 RX ADMIN — PACLITAXEL 342 MG: 6 INJECTION, SOLUTION INTRAVENOUS at 11:04

## 2024-04-25 NOTE — PLAN OF CARE
Problem: Adult Inpatient Plan of Care  Goal: Plan of Care Review  Outcome: Progressing  Flowsheets (Taken 4/25/2024 1000)  Plan of Care Reviewed With:   patient   spouse  Goal: Patient-Specific Goal (Individualized)  Outcome: Progressing  Flowsheets (Taken 4/25/2024 1000)  Anxieties, Fears or Concerns: First day of treatment  Individualized Care Needs: REcliner, warm blanket,  at chairside, education, conversation, tv  Goal: Optimal Comfort and Wellbeing  Outcome: Progressing  Intervention: Provide Person-Centered Care  Flowsheets (Taken 4/25/2024 1000)  Trust Relationship/Rapport:   care explained   empathic listening provided   questions answered   questions encouraged   reassurance provided   thoughts/feelings acknowledged    Patient to Infusion for Taxol and Carbo Day 1, accompanied by her . Treatment plan reviewed with patient. VSS. Tolerated infusion. Provided with copy of upcoming appointment schedule. Escorted to the front lobby in no distress for discharge to home.

## 2024-04-25 NOTE — PROGRESS NOTES
LCSW met with patient at the chairside during infusion. Patient's  accompanied her today for her first visit. Patient declined any emotional or practical needs from the  at this time. Reminded patient to reach out if needed. Patient expressed gratitude. Will follow up with patient at next appointment in three weeks.

## 2024-04-26 ENCOUNTER — TELEPHONE (OUTPATIENT)
Dept: GYNECOLOGIC ONCOLOGY | Facility: CLINIC | Age: 64
End: 2024-04-26
Payer: MEDICARE

## 2024-04-26 RX ORDER — DEXAMETHASONE 4 MG/1
TABLET ORAL
Qty: 120 TABLET | Refills: 0 | Status: SHIPPED | OUTPATIENT
Start: 2024-04-26

## 2024-04-26 NOTE — TELEPHONE ENCOUNTER
----- Message from Joanna Roe sent at 4/26/2024  9:34 AM CDT -----  Regarding: rx instructions  Name of Who is Calling: [facility] [phone]          What is the request in detail:  [facility] is calling to verify the instructions on rx dexAMETHasone (DECADRON) 4 MG Tab. [facility] to clarify        Can the clinic reply by [facility]:          What Number to Call Back if not in [facility]:

## 2024-05-06 DIAGNOSIS — E11.00 TYPE 2 DIABETES MELLITUS WITH HYPEROSMOLARITY WITHOUT COMA, WITHOUT LONG-TERM CURRENT USE OF INSULIN: ICD-10-CM

## 2024-05-06 NOTE — TELEPHONE ENCOUNTER
----- Message from Luis Mars sent at 5/6/2024 10:36 AM CDT -----  Contact: self [phone]  Type:  RX Refill Request    Who Called: Pricilla   Refill or New Rx:refill   RX Name and Strength:metFORMIN (GLUCOPHAGE) 1000 MG tablet  How is the patient currently taking it? (ex. 1XDay):  Is this a 30 day or 90 day RX:  Preferred Pharmacy with phone number:  [facility] [place] [id number] [address] [address]  Phone: [phone] Fax: [phone]  Local or Mail Order:local  Ordering Provider:Dolores Kelly   Would the patient rather a call back or a response via [facility]? Call back   Best Call Back Number:[phone]  Additional Information:

## 2024-05-06 NOTE — TELEPHONE ENCOUNTER
I returned a call back to the pt and she was inquiring on her metformin . Looks like it was written on 02/23/24 but, stated printed and she said she did not get one printed and the pharmacy needs a script. Please review and send in when you get a chance. Thanks //kah

## 2024-05-07 NOTE — TELEPHONE ENCOUNTER
I returned a call back to the pt. She was checking the status of her request. She stated she requested it on yesterday and her pharmacy said they have not received it and I replied unfortunately it has not been addressed yet but, I will reiterate this to the provider . Which she handles the request in the order in which they come in. I informed she will be notified once completed. She verbalized understanding. [email or web address]

## 2024-05-07 NOTE — TELEPHONE ENCOUNTER
----- Message from Janet Brown sent at 5/7/2024  1:38 PM CDT -----  Contact: self  [phone]  Pt called in concerning prescription metformin. Pt is still waiting on prescription to be sent into pharmacy. Please call back  [phone] thanks lay         .  [facility] [address] [place] [address] [address] [address]  Phone: [phone] Fax: [phone]

## 2024-05-08 RX ORDER — METFORMIN HYDROCHLORIDE 1000 MG/1
1000 TABLET ORAL
Qty: 90 TABLET | Refills: 3 | Status: SHIPPED | OUTPATIENT
Start: 2024-05-08

## 2024-05-14 NOTE — PROGRESS NOTES
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  Washings negative  ER/MS+  HER2 fish negative  P53+  MMRp     48 pretreatment    Discussed adjuvant chemotherapy with carbo/taxol    C1 - 2024  C2 -     Today's visit  Presents today for evaluation prior to cycle #2 Paclitaxel/Carboplatin. Labs reviewed and appropriate for treatment. Continues to recover appropriately from surgery. Up and about, eating, +BM.     Lab Results   Component Value Date/Time     18 05/15/2024 08:38 AM     18 2024 08:30 AM     48 (H) 2024 07:53 AM      Referral History  Pricilla Bower is a 63 y.o.  female referred by Breana Avilez NP for new diagnosis of high-grade serous endometrial adenocarcinoma. She presented to her PCP 24 with complaints of post menopausal bleeding and bulging sensation in pelvic area.     24 EMB-   High-grade serous carcinoma. ER/MS+, HER2 equivocal, p16+, P53+   HER2 fish negative    24 Pelvic US-   Uterus is retroflexed or retroverted length 10 cm. Endometrium heterogeneous appears partially cystic thickened up to 3 cm with vascular flow. Right ovary unremarkable in the left is not seen.     Today she reports occasional pelvic pressure but denies continued vaginal bleeding. She denies changes in her bowel or bladder habits. She has had SVDx2; denies any previous abdominal or pelvic surgeries. She has a family history of breast ad cervical cancer in her mother. Denies any family history of endometrial or ovarian cancer. She is up to date on Pap screening and denies history of abnormal results. She has never had colon cancer screening; is scheduled for colonoscopy in November.   "    Review of Systems   Constitutional:  Negative for appetite change, chills and fever.   Respiratory:  Negative for cough and shortness of breath.    Cardiovascular:  Negative for chest pain.   Gastrointestinal:  Negative for abdominal distention, abdominal pain, constipation, diarrhea and vomiting.   Genitourinary:  Negative for difficulty urinating, pelvic pain, vaginal bleeding and vaginal discharge.    Skin: Negative.    Hematological:  Negative for adenopathy.   Psychiatric/Behavioral: Negative.       OBJECTIVE     /77 (BP Location: Right arm, Patient Position: Sitting, BP Method: Medium (Automatic))   Pulse 68   Temp 96.9 °F (36.1 °C) (Temporal)   Resp 16   Ht 5' 5.5" (1.664 m)   Wt 83 kg (182 lb 15.7 oz)   SpO2 99%   BMI 29.99 kg/m²     Physical Exam  Constitutional:       Appearance: Normal appearance.   HENT:      Nose: Nose normal.   Cardiovascular:      Rate and Rhythm: Normal rate.   Abdominal:      General: Abdomen is flat.      Palpations: Abdomen is soft.   Musculoskeletal:         General: Normal range of motion.   Neurological:      General: No focal deficit present.      Mental Status: She is alert. Mental status is at baseline.   Skin:     General: Skin is dry.   Psychiatric:         Mood and Affect: Mood normal.         Behavior: Behavior normal.       ASSESSMENT     1. Primary serous adenocarcinoma of endometrium    2. Examination prior to chemotherapy      PLAN     Okay to treat C2 as above   Infusion nurses unable to obtain IV access. Per infusion 6 attempts were made. Patient offered PICC or port placement and denied at this time. Her preference is to re-attempt IV on Monday. Port recommended if continued difficulty with IV access. Will delay C2 until Monday, 5/20/24, and plan for C3 on Thursday, 6/13/24.     RTC prechemo or sooner if needed.     I spent approximately 45 minutes reviewing the available records and evaluating the patient, out of which over 50% of the time was " spent face to face with the patient in counseling and coordinating this patient's care.

## 2024-05-15 ENCOUNTER — LAB VISIT (OUTPATIENT)
Dept: LAB | Facility: HOSPITAL | Age: 64
End: 2024-05-15
Attending: OBSTETRICS & GYNECOLOGY
Payer: MEDICARE

## 2024-05-15 DIAGNOSIS — Z51.11 ENCOUNTER FOR CHEMOTHERAPY MANAGEMENT: ICD-10-CM

## 2024-05-15 DIAGNOSIS — C54.1 ENDOMETRIAL CANCER: ICD-10-CM

## 2024-05-15 LAB
ALBUMIN SERPL BCP-MCNC: 4.1 G/DL (ref 3.5–5.2)
ALP SERPL-CCNC: 82 U/L (ref 55–135)
ALT SERPL W/O P-5'-P-CCNC: 45 U/L (ref 10–44)
ANION GAP SERPL CALC-SCNC: 12 MMOL/L (ref 8–16)
AST SERPL-CCNC: 25 U/L (ref 10–40)
BILIRUB SERPL-MCNC: 0.3 MG/DL (ref 0.1–1)
BUN SERPL-MCNC: 8 MG/DL (ref 8–23)
CALCIUM SERPL-MCNC: 9.9 MG/DL (ref 8.7–10.5)
CANCER AG125 SERPL-ACNC: 18 U/ML (ref 0–30)
CHLORIDE SERPL-SCNC: 101 MMOL/L (ref 95–110)
CO2 SERPL-SCNC: 27 MMOL/L (ref 23–29)
CREAT SERPL-MCNC: 0.8 MG/DL (ref 0.5–1.4)
ERYTHROCYTE [DISTWIDTH] IN BLOOD BY AUTOMATED COUNT: 14.7 % (ref 11.5–14.5)
EST. GFR  (NO RACE VARIABLE): >60 ML/MIN/1.73 M^2
GLUCOSE SERPL-MCNC: 211 MG/DL (ref 70–110)
HCT VFR BLD AUTO: 38.1 % (ref 37–48.5)
HGB BLD-MCNC: 12.6 G/DL (ref 12–16)
IMM GRANULOCYTES # BLD AUTO: 0.02 K/UL (ref 0–0.04)
MCH RBC QN AUTO: 29.4 PG (ref 27–31)
MCHC RBC AUTO-ENTMCNC: 33.1 G/DL (ref 32–36)
MCV RBC AUTO: 89 FL (ref 82–98)
NEUTROPHILS # BLD AUTO: 3.6 K/UL (ref 1.8–7.7)
PLATELET # BLD AUTO: 247 K/UL (ref 150–450)
PMV BLD AUTO: 10.8 FL (ref 9.2–12.9)
POTASSIUM SERPL-SCNC: 3.9 MMOL/L (ref 3.5–5.1)
PROT SERPL-MCNC: 7.5 G/DL (ref 6–8.4)
RBC # BLD AUTO: 4.28 M/UL (ref 4–5.4)
SODIUM SERPL-SCNC: 140 MMOL/L (ref 136–145)
WBC # BLD AUTO: 5.32 K/UL (ref 3.9–12.7)

## 2024-05-15 PROCEDURE — 86304 IMMUNOASSAY TUMOR CA 125: CPT | Mod: HCNC | Performed by: OBSTETRICS & GYNECOLOGY

## 2024-05-15 PROCEDURE — 85027 COMPLETE CBC AUTOMATED: CPT | Mod: HCNC | Performed by: OBSTETRICS & GYNECOLOGY

## 2024-05-15 PROCEDURE — 80053 COMPREHEN METABOLIC PANEL: CPT | Mod: HCNC | Performed by: OBSTETRICS & GYNECOLOGY

## 2024-05-15 PROCEDURE — 36415 COLL VENOUS BLD VENIPUNCTURE: CPT | Mod: HCNC,PN | Performed by: OBSTETRICS & GYNECOLOGY

## 2024-05-16 ENCOUNTER — OFFICE VISIT (OUTPATIENT)
Dept: GYNECOLOGIC ONCOLOGY | Facility: CLINIC | Age: 64
End: 2024-05-16
Payer: MEDICARE

## 2024-05-16 VITALS
OXYGEN SATURATION: 99 % | DIASTOLIC BLOOD PRESSURE: 77 MMHG | TEMPERATURE: 97 F | HEART RATE: 68 BPM | BODY MASS INDEX: 29.41 KG/M2 | WEIGHT: 183 LBS | HEIGHT: 66 IN | SYSTOLIC BLOOD PRESSURE: 130 MMHG | RESPIRATION RATE: 16 BRPM

## 2024-05-16 DIAGNOSIS — Z01.818 EXAMINATION PRIOR TO CHEMOTHERAPY: ICD-10-CM

## 2024-05-16 DIAGNOSIS — C54.1 PRIMARY SEROUS ADENOCARCINOMA OF ENDOMETRIUM: Primary | ICD-10-CM

## 2024-05-16 PROCEDURE — 1126F AMNT PAIN NOTED NONE PRSNT: CPT | Mod: HCNC,CPTII,S$GLB, | Performed by: OBSTETRICS & GYNECOLOGY

## 2024-05-16 PROCEDURE — 1101F PT FALLS ASSESS-DOCD LE1/YR: CPT | Mod: HCNC,CPTII,S$GLB, | Performed by: OBSTETRICS & GYNECOLOGY

## 2024-05-16 PROCEDURE — 99215 OFFICE O/P EST HI 40 MIN: CPT | Mod: 24,HCNC,S$GLB, | Performed by: OBSTETRICS & GYNECOLOGY

## 2024-05-16 PROCEDURE — 3288F FALL RISK ASSESSMENT DOCD: CPT | Mod: HCNC,CPTII,S$GLB, | Performed by: OBSTETRICS & GYNECOLOGY

## 2024-05-16 PROCEDURE — 3078F DIAST BP <80 MM HG: CPT | Mod: HCNC,CPTII,S$GLB, | Performed by: OBSTETRICS & GYNECOLOGY

## 2024-05-16 PROCEDURE — 3044F HG A1C LEVEL LT 7.0%: CPT | Mod: HCNC,CPTII,S$GLB, | Performed by: OBSTETRICS & GYNECOLOGY

## 2024-05-16 PROCEDURE — 3008F BODY MASS INDEX DOCD: CPT | Mod: HCNC,CPTII,S$GLB, | Performed by: OBSTETRICS & GYNECOLOGY

## 2024-05-16 PROCEDURE — 3075F SYST BP GE 130 - 139MM HG: CPT | Mod: HCNC,CPTII,S$GLB, | Performed by: OBSTETRICS & GYNECOLOGY

## 2024-05-16 PROCEDURE — 99999 PR PBB SHADOW E&M-EST. PATIENT-LVL IV: CPT | Mod: PBBFAC,HCNC,, | Performed by: OBSTETRICS & GYNECOLOGY

## 2024-05-16 RX ORDER — HEPARIN 100 UNIT/ML
500 SYRINGE INTRAVENOUS
Status: CANCELLED | OUTPATIENT
Start: 2024-05-16

## 2024-05-16 RX ORDER — FAMOTIDINE 10 MG/ML
20 INJECTION INTRAVENOUS
Status: CANCELLED | OUTPATIENT
Start: 2024-05-16

## 2024-05-16 RX ORDER — DIPHENHYDRAMINE HYDROCHLORIDE 50 MG/ML
50 INJECTION INTRAMUSCULAR; INTRAVENOUS ONCE AS NEEDED
Status: CANCELLED | OUTPATIENT
Start: 2024-05-16

## 2024-05-16 RX ORDER — PROCHLORPERAZINE EDISYLATE 5 MG/ML
5 INJECTION INTRAMUSCULAR; INTRAVENOUS ONCE AS NEEDED
Status: CANCELLED | OUTPATIENT
Start: 2024-05-16

## 2024-05-16 RX ORDER — SODIUM CHLORIDE 0.9 % (FLUSH) 0.9 %
10 SYRINGE (ML) INJECTION
Status: CANCELLED | OUTPATIENT
Start: 2024-05-16

## 2024-05-16 RX ORDER — EPINEPHRINE 0.3 MG/.3ML
0.3 INJECTION SUBCUTANEOUS ONCE AS NEEDED
Status: CANCELLED | OUTPATIENT
Start: 2024-05-16

## 2024-05-17 ENCOUNTER — TELEPHONE (OUTPATIENT)
Dept: HEMATOLOGY/ONCOLOGY | Facility: CLINIC | Age: 64
End: 2024-05-17
Payer: MEDICARE

## 2024-05-17 RX ORDER — FAMOTIDINE 10 MG/ML
20 INJECTION INTRAVENOUS
Status: CANCELLED | OUTPATIENT
Start: 2024-05-20

## 2024-05-17 RX ORDER — EPINEPHRINE 0.3 MG/.3ML
0.3 INJECTION SUBCUTANEOUS ONCE AS NEEDED
Status: CANCELLED | OUTPATIENT
Start: 2024-05-20

## 2024-05-17 RX ORDER — HEPARIN 100 UNIT/ML
500 SYRINGE INTRAVENOUS
Status: CANCELLED | OUTPATIENT
Start: 2024-05-20

## 2024-05-17 RX ORDER — PROCHLORPERAZINE EDISYLATE 5 MG/ML
5 INJECTION INTRAMUSCULAR; INTRAVENOUS ONCE AS NEEDED
Status: CANCELLED | OUTPATIENT
Start: 2024-05-20

## 2024-05-17 RX ORDER — SODIUM CHLORIDE 0.9 % (FLUSH) 0.9 %
10 SYRINGE (ML) INJECTION
Status: CANCELLED | OUTPATIENT
Start: 2024-05-20

## 2024-05-17 RX ORDER — DIPHENHYDRAMINE HYDROCHLORIDE 50 MG/ML
50 INJECTION INTRAMUSCULAR; INTRAVENOUS ONCE AS NEEDED
Status: CANCELLED | OUTPATIENT
Start: 2024-05-20

## 2024-05-17 NOTE — TELEPHONE ENCOUNTER
Spoke to pt to remind of appt on 5/20/24 with Opal. Pt verbalized understanding and confirmed appt Location was discussed.

## 2024-05-20 ENCOUNTER — INFUSION (OUTPATIENT)
Dept: INFUSION THERAPY | Facility: HOSPITAL | Age: 64
End: 2024-05-20
Attending: OBSTETRICS & GYNECOLOGY
Payer: MEDICARE

## 2024-05-20 ENCOUNTER — OFFICE VISIT (OUTPATIENT)
Dept: HEMATOLOGY/ONCOLOGY | Facility: CLINIC | Age: 64
End: 2024-05-20
Payer: MEDICARE

## 2024-05-20 ENCOUNTER — DOCUMENTATION ONLY (OUTPATIENT)
Dept: INFUSION THERAPY | Facility: HOSPITAL | Age: 64
End: 2024-05-20
Payer: MEDICARE

## 2024-05-20 VITALS
TEMPERATURE: 98 F | OXYGEN SATURATION: 99 % | WEIGHT: 184.94 LBS | SYSTOLIC BLOOD PRESSURE: 138 MMHG | RESPIRATION RATE: 18 BRPM | HEART RATE: 94 BPM | HEIGHT: 66 IN | BODY MASS INDEX: 29.72 KG/M2 | DIASTOLIC BLOOD PRESSURE: 75 MMHG

## 2024-05-20 DIAGNOSIS — C54.1 ENDOMETRIAL CANCER: ICD-10-CM

## 2024-05-20 DIAGNOSIS — C54.1 PRIMARY SEROUS ADENOCARCINOMA OF ENDOMETRIUM: Primary | ICD-10-CM

## 2024-05-20 PROCEDURE — 96375 TX/PRO/DX INJ NEW DRUG ADDON: CPT | Mod: HCNC,PN

## 2024-05-20 PROCEDURE — 25000003 PHARM REV CODE 250: Mod: HCNC,PN | Performed by: OBSTETRICS & GYNECOLOGY

## 2024-05-20 PROCEDURE — 96413 CHEMO IV INFUSION 1 HR: CPT | Mod: HCNC,PN

## 2024-05-20 PROCEDURE — 99204 OFFICE O/P NEW MOD 45 MIN: CPT | Mod: HCNC,S$GLB,, | Performed by: NURSE PRACTITIONER

## 2024-05-20 PROCEDURE — 96367 TX/PROPH/DG ADDL SEQ IV INF: CPT | Mod: HCNC,PN

## 2024-05-20 PROCEDURE — 96417 CHEMO IV INFUS EACH ADDL SEQ: CPT | Mod: HCNC,PN

## 2024-05-20 PROCEDURE — 3044F HG A1C LEVEL LT 7.0%: CPT | Mod: HCNC,CPTII,S$GLB, | Performed by: NURSE PRACTITIONER

## 2024-05-20 PROCEDURE — 1160F RVW MEDS BY RX/DR IN RCRD: CPT | Mod: HCNC,CPTII,S$GLB, | Performed by: NURSE PRACTITIONER

## 2024-05-20 PROCEDURE — 63600175 PHARM REV CODE 636 W HCPCS: Mod: HCNC,PN | Performed by: OBSTETRICS & GYNECOLOGY

## 2024-05-20 PROCEDURE — 1159F MED LIST DOCD IN RCRD: CPT | Mod: HCNC,CPTII,S$GLB, | Performed by: NURSE PRACTITIONER

## 2024-05-20 PROCEDURE — 99999 PR PBB SHADOW E&M-EST. PATIENT-LVL III: CPT | Mod: PBBFAC,HCNC,, | Performed by: NURSE PRACTITIONER

## 2024-05-20 PROCEDURE — 96415 CHEMO IV INFUSION ADDL HR: CPT | Mod: HCNC,PN

## 2024-05-20 RX ORDER — FAMOTIDINE 10 MG/ML
20 INJECTION INTRAVENOUS
Status: COMPLETED | OUTPATIENT
Start: 2024-05-20 | End: 2024-05-20

## 2024-05-20 RX ORDER — PROCHLORPERAZINE EDISYLATE 5 MG/ML
5 INJECTION INTRAMUSCULAR; INTRAVENOUS ONCE AS NEEDED
Status: DISCONTINUED | OUTPATIENT
Start: 2024-05-20 | End: 2024-05-20 | Stop reason: HOSPADM

## 2024-05-20 RX ORDER — DIPHENHYDRAMINE HYDROCHLORIDE 50 MG/ML
50 INJECTION INTRAMUSCULAR; INTRAVENOUS ONCE AS NEEDED
Status: DISCONTINUED | OUTPATIENT
Start: 2024-05-20 | End: 2024-05-20 | Stop reason: HOSPADM

## 2024-05-20 RX ORDER — EPINEPHRINE 0.3 MG/.3ML
0.3 INJECTION SUBCUTANEOUS ONCE AS NEEDED
Status: DISCONTINUED | OUTPATIENT
Start: 2024-05-20 | End: 2024-05-20 | Stop reason: HOSPADM

## 2024-05-20 RX ORDER — SODIUM CHLORIDE 0.9 % (FLUSH) 0.9 %
10 SYRINGE (ML) INJECTION
Status: DISCONTINUED | OUTPATIENT
Start: 2024-05-20 | End: 2024-05-20 | Stop reason: HOSPADM

## 2024-05-20 RX ADMIN — DEXAMETHASONE SODIUM PHOSPHATE 0.25 MG: 10 INJECTION, SOLUTION INTRAMUSCULAR; INTRAVENOUS at 09:05

## 2024-05-20 RX ADMIN — DIPHENHYDRAMINE HYDROCHLORIDE 50 MG: 50 INJECTION, SOLUTION INTRAMUSCULAR; INTRAVENOUS at 09:05

## 2024-05-20 RX ADMIN — APREPITANT 130 MG: 130 INJECTION, EMULSION INTRAVENOUS at 09:05

## 2024-05-20 RX ADMIN — CARBOPLATIN 695 MG: 600 INJECTION, SOLUTION INTRAVENOUS at 01:05

## 2024-05-20 RX ADMIN — SODIUM CHLORIDE: 9 INJECTION, SOLUTION INTRAVENOUS at 09:05

## 2024-05-20 RX ADMIN — PACLITAXEL 342 MG: 6 INJECTION, SOLUTION INTRAVENOUS at 10:05

## 2024-05-20 RX ADMIN — FAMOTIDINE 20 MG: 10 INJECTION INTRAVENOUS at 10:05

## 2024-05-20 NOTE — PROGRESS NOTES
"Pricilla Bower  63 y.o. is here to seek an integrative approach to discuss side effects related to endometrium cancer treatment. Pricilla Bower  was referred by Dr. Goldberg     HPI  Mrs. Bower is here today with her friend and sister, Nikki. She was having postmenopausal bleeding and after further imaging and was diagnosed with cancer. She had total hysterectomy in March and reports her recovery went well.  She is on her second cycle of treatment and did well with her first treatment. She reports she sleeps well and denies fatigue. She has a strong Heidy. She states, "I trust Him and walk with Him." She has a good appetite and eats a healthy diet. 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 She then walks for an hour after work each day.   She has been  for 22 years and she has 2 sons. She works at the furniture store and radio station. She has a good support system.       7 pillars Assessment    Sleep  How many hours of sleep per night? 9 hours  Do you have trouble falling asleep, staying asleep or waking up earlier than you need to? no  Do you have daytime fatigue? no  Do you need medication for sleep? no  Do you use any supplements or other interventions for sleep? no    Resilience  Rate your current level of stress- "none"    Purpose  Do you feel you have a vision or a life purpose? Yes    Environment  Any exposures:no known exposures    Spirituality-  Scientologist, strong heidy    Nutrition   Food allergies or sensitivities: no  Do you adhere to a particular type of diet? no  Do you have any concerns with your eating habits? no    Exercise  How would you describe your physical activity level? moderate  Do you work at a sedentary job? no  What do you do for physical activity? walk    Past Medical History  Past Medical History:   Diagnosis Date    Anticoagulant long-term use     Diabetes mellitus, type 2 2010    A1c >14% on 1/29/20 & 11% on 3/8/19; Lost to F/U as of 7/2019; to ER " for glu >500 at [facility] 1/23/20    Endometrial cancer 02/2024    Hyperlipidemia     Hypertension     Noncompliance with treatment plan     Uncontrolled DM2 due to noncompliance with insulin and follow up; A1c 11% on 3/8/19; Lost to F/U as of 7/2019; to ER for glu >500 at [facility] 1/23/20 -> A1c >14% on 1/29/20 -> rec f/u in 1 wk with FBG log to demontrate understanding of insulin titration    Obesity (BMI 30.0-34.9)     Vitamin D deficiency       Past Surgical History   Past Surgical History:   Procedure Laterality Date    MAPPING, LYMPH NODE, SENTINEL Bilateral 3/18/2024    Procedure: MAPPING, LYMPH NODE, SENTINEL;  Surgeon: Siria Goldberg MD;  Location: [facility] OR;  Service: OB/GYN;  Laterality: Bilateral;    NO PAST SURGERIES      ROBOT-ASSISTED LAPAROSCOPIC ABDOMINAL HYSTERECTOMY USING DA VIKTORIYA XI N/A 3/18/2024    Procedure: XI ROBOTIC HYSTERECTOMY;  Surgeon: Siria Goldberg MD;  Location: [facility] OR;  Service: OB/GYN;  Laterality: N/A;    ROBOT-ASSISTED LAPAROSCOPIC PELVIC LYMPHADENECTOMY USING DA VIKTORIYA XI Bilateral 3/18/2024    Procedure: XI ROBOTIC LYMPHADENECTOMY, PELVIC;  Surgeon: Siria Goldberg MD;  Location: [facility] OR;  Service: OB/GYN;  Laterality: Bilateral;    ROBOT-ASSISTED LAPAROSCOPIC SALPINGO-OOPHORECTOMY USING DA VIKTORIYA XI Bilateral 3/18/2024    Procedure: XI ROBOTIC SALPINGO-OOPHORECTOMY;  Surgeon: Siria Goldberg MD;  Location: [facility] OR;  Service: OB/GYN;  Laterality: Bilateral;      Family History   Family History   Problem Relation Name Age of Onset    Breast cancer Mother      Cervical cancer Mother        Allergies  Review of patient's allergies indicates:   Allergen Reactions    Darvocet a500 [propoxyphene n-acetaminophen] Nausea And Vomiting    Penicillins       Current Medications:    Current Outpatient Medications:     amLODIPine (NORVASC) 5 MG tablet, Take 1 tablet by mouth once daily (Patient taking differently: Take 5 mg by mouth once daily.), Disp: 90 tablet, Rfl: 3    aspirin 81 MG Chew,  Take 1 tablet (81 mg total) by mouth once daily., Disp: 100 tablet, Rfl: 5    atorvastatin (LIPITOR) 40 MG tablet, Take 1 tablet (40 mg total) by mouth every evening., Disp: 90 tablet, Rfl: 3    betamethasone dipropionate (DIPROLENE) 0.05 % ointment, Apply topically 2 (two) times daily. (Patient taking differently: Apply topically 2 (two) times daily as needed.), Disp: 15 g, Rfl: 5    blood sugar diagnostic Strp, To check BG 2 times daily, to use with insurance preferred meter, Disp: 100 each, Rfl: 3    blood-glucose meter kit, To check BG 2 times daily, to use with insurance preferred meter/Accu-Check Guide ME, Disp: 1 each, Rfl: 0    ciclopirox (PENLAC) 8 % Soln, Apply to fingernails and adjacent skin once daily in combination with weekly nail trimming. Remove with alcohol every 7 days; continue therapy until nail clears (Patient taking differently: Apply topically nightly as needed. Apply to fingernails and adjacent skin once daily in combination with weekly nail trimming. Remove with alcohol every 7 days; continue therapy until nail clears), Disp: 6.6 mL, Rfl: 5    cyclobenzaprine (FLEXERIL) 5 MG tablet, Take 1 tablet (5 mg total) by mouth 2 (two) times daily as needed (muscle pain). Will cause drowsiness, Disp: 20 tablet, Rfl: 1    dexAMETHasone (DECADRON) 4 MG Tab, TAKE 2 TABLETS BY MOUTH DAILY. TAKE 2 TABLETS (8 MG) BY MOUTH ON DAYS 2,3, AND 4 FOLLOWING CHEMOTHERAPY, Disp: 120 tablet, Rfl: 0    diclofenac (VOLTAREN) 50 MG EC tablet, Take 1 tablet (50 mg total) by mouth 2 (two) times daily as needed (pain). Take with food, Disp: 30 tablet, Rfl: 0    gabapentin (NEURONTIN) 300 MG capsule, Take 1 capsule (300 mg total) by mouth 3 (three) times daily., Disp: 90 capsule, Rfl: 11    ibuprofen (ADVIL,MOTRIN) 600 MG tablet, Take 1 tablet (600 mg total) by mouth every 8 (eight) hours as needed for Pain., Disp: 30 tablet, Rfl: 0    ketoconazole (NIZORAL) 2 % cream, Apply topically 2 (two) times daily. Continue use for  1 week after resolution of fungal rash., Disp: 30 g, Rfl: 1    ketoconazole (NIZORAL) 2 % shampoo, Apply topically twice a week., Disp: 120 mL, Rfl: 5    lancets Misc, To check BG 2 times daily, to use with insurance preferred meter, Disp: 100 each, Rfl: 3    latanoprost 0.005 % ophthalmic solution, Place 1 drop into both eyes every evening., Disp: 2.5 mL, Rfl: 11    metFORMIN (GLUCOPHAGE) 1000 MG tablet, Take 1 tablet (1,000 mg total) by mouth daily with breakfast., Disp: 90 tablet, Rfl: 3    MOUNJARO 2.5 mg/0.5 mL PnIj, Inject 2.5 mg into the skin every Thursday., Disp: , Rfl:     oxyCODONE-acetaminophen (PERCOCET) 5-325 mg per tablet, Take 1 tablet by mouth every 6 (six) hours as needed for Pain., Disp: 20 tablet, Rfl: 0    prochlorperazine (COMPAZINE) 10 MG tablet, Take 1 tablet (10 mg total) by mouth every 6 (six) hours as needed (chemotherapy induced nausea)., Disp: 30 tablet, Rfl: 1    timolol maleate 0.5% (TIMOPTIC) 0.5 % Drop, Place 1 drop into both eyes every morning., Disp: 5 mL, Rfl: 6  No current facility-administered medications for this visit.    Facility-Administered Medications Ordered in Other Visits:     aprepitant (CINVANTI) injection 130 mg, 130 mg, Intravenous, 1 time in Clinic/[facility], Siria Goldberg MD    CARBOplatin (PARAPLATIN) 695 mg in sodium chloride 0.9% 354.5 mL chemo infusion, 695 mg, Intravenous, 1 time in Clinic/Yusuf BLAKE Katrina S., MD    diphenhydrAMINE (BENADRYL) 50 mg in NS 50 mL IVPB, 50 mg, Intravenous, 1 time in Clinic/[facility], Siria Goldberg MD    diphenhydrAMINE injection 50 mg, 50 mg, Intravenous, Once PRN, Siria Goldberg MD    EPINEPHrine (EPIPEN) 0.3 mg/0.3 mL pen injection 0.3 mg, 0.3 mg, Intramuscular, Once PRN, Siria Goldberg MD    famotidine (PF) injection 20 mg, 20 mg, Intravenous, 1 time in Clinic/HOD, Siria Goldberg MD    hydrocortisone sodium succinate injection 100 mg, 100 mg, Intravenous, Once PRN, Siria Goldberg MD    PACLitaxeL (TAXOL) 175 mg/m2 =  342 mg in sodium chloride 0.9% 500 mL chemo infusion, 175 mg/m2 (Treatment Plan Recorded), Intravenous, 1 time in Clinic/HOD, Siria Goldberg MD    prochlorperazine injection Soln 5 mg, 5 mg, Intravenous, Once PRN, Siria Goldberg MD    sodium chloride 0.9% 250 mL flush bag, , Intravenous, PRN, Siria Goldberg MD, Last Rate: 25 mL/hr at 05/20/24 0913, New Bag at 05/20/24 0913    sodium chloride 0.9% flush 10 mL, 10 mL, Intravenous, PRN, Siria Goldberg MD     Review of Systems  Review of Systems   Constitutional: Negative.    HENT: Negative.     Eyes: Negative.    Respiratory: Negative.     Cardiovascular: Negative.    Gastrointestinal: Negative.    Genitourinary: Negative.    Musculoskeletal: Negative.    Skin: Negative.    Neurological: Negative.    Endo/Heme/Allergies: Negative.    Psychiatric/Behavioral: Negative.        Physical Exam      /76   HR 78  RR 16  Temp 97.7  Physical Exam  Vitals reviewed.   Constitutional:       Appearance: Normal appearance.   Neurological:      Mental Status: She is alert.   Psychiatric:         Mood and Affect: Mood normal.         Behavior: Behavior normal.        ASSESSMENT :  1. Endometrial cancer       PLAN:  Reviewed all information discussed at today's visit and all questions were answered.    Counseled on healthy lifestyle and behavior modifications   See Nutrition during treatment  I discussed and recommended the following support services:  Elio Chi and Yoga I suggested Elio Chi and/or Yoga as these practices reduce stress, increases flexibility and muscle strength, improves balance and promotes serenity in the power of movement to help fight disease and boost your immune system.   Music and relaxation therapy and Meditation which can decrease stress by lowering blood pressure, slowing breathing, and helping you be more present in the moment. It improves sleep by relaxing the body and mind at the end of the day.Meditation also trains you how to focus on one  thing at a time, improving concentration. It also promotes emotional well-being by decreasing depression and anxiety, and helping create a more positive outlook on life.    Follow up with [facility] in 9 weeks,5th cycle, chairside.     I spent a total of 45 minutes on the day of the visit.This includes face to face time and non-face to face time preparing to see the patient (eg, review of tests), obtaining and/or reviewing separately obtained history, documenting clinical information in the electronic or other health record, independently interpreting results and communicating results to the patient/family/caregiver, or care coordinator.

## 2024-05-20 NOTE — PROGRESS NOTES
Oncology Nutrition   Chemotherapy Infusion Visit    Nutrition Follow Up   RD stopped by chairside infusion. Pt was sleeping with family member at chairside. Introduced self and asked if any questions about nutrition information pt received during chemo school. She said there were no concerns or questions regarding nutrition. Appetite is good.   Explained role of RD. Attempted to stop by second time but pt still asleep. Note: weight charted below - no loss; slight gain.     Wt Readings from Last 10 Encounters:   05/20/24 83.9 kg (184 lb 15.5 oz)   05/16/24 83 kg (182 lb 15.7 oz)   05/16/24 83 kg (182 lb 15.7 oz)   04/25/24 81.8 kg (180 lb 5.4 oz)   04/25/24 81.8 kg (180 lb 5.4 oz)   04/18/24 81.1 kg (178 lb 12.7 oz)   04/04/24 81.9 kg (180 lb 8.9 oz)   03/18/24 80.3 kg (177 lb)   03/15/24 80.6 kg (177 lb 11.1 oz)   03/03/24 80.7 kg (178 lb)     Will continue to monitor prn throughout treatment.     Estela Powers, ZAYNABN, LDN, [facility]  05/20/2024  12:44 PM

## 2024-06-12 ENCOUNTER — LAB VISIT (OUTPATIENT)
Dept: LAB | Facility: HOSPITAL | Age: 64
End: 2024-06-12
Attending: OBSTETRICS & GYNECOLOGY
Payer: MEDICARE

## 2024-06-12 DIAGNOSIS — C54.1 ENDOMETRIAL CANCER: ICD-10-CM

## 2024-06-12 DIAGNOSIS — Z51.11 ENCOUNTER FOR CHEMOTHERAPY MANAGEMENT: ICD-10-CM

## 2024-06-12 LAB
ALBUMIN SERPL BCP-MCNC: 3.8 G/DL (ref 3.5–5.2)
ALP SERPL-CCNC: 77 U/L (ref 55–135)
ALT SERPL W/O P-5'-P-CCNC: 31 U/L (ref 10–44)
ANION GAP SERPL CALC-SCNC: 12 MMOL/L (ref 8–16)
AST SERPL-CCNC: 22 U/L (ref 10–40)
BILIRUB SERPL-MCNC: 0.3 MG/DL (ref 0.1–1)
BUN SERPL-MCNC: 11 MG/DL (ref 8–23)
CALCIUM SERPL-MCNC: 9.5 MG/DL (ref 8.7–10.5)
CANCER AG125 SERPL-ACNC: 22 U/ML (ref 0–30)
CHLORIDE SERPL-SCNC: 101 MMOL/L (ref 95–110)
CO2 SERPL-SCNC: 26 MMOL/L (ref 23–29)
CREAT SERPL-MCNC: 0.9 MG/DL (ref 0.5–1.4)
ERYTHROCYTE [DISTWIDTH] IN BLOOD BY AUTOMATED COUNT: 15.5 % (ref 11.5–14.5)
EST. GFR  (NO RACE VARIABLE): >60 ML/MIN/1.73 M^2
GLUCOSE SERPL-MCNC: 243 MG/DL (ref 70–110)
HCT VFR BLD AUTO: 36 % (ref 37–48.5)
HGB BLD-MCNC: 12.1 G/DL (ref 12–16)
IMM GRANULOCYTES # BLD AUTO: 0.02 K/UL (ref 0–0.04)
MCH RBC QN AUTO: 30.1 PG (ref 27–31)
MCHC RBC AUTO-ENTMCNC: 33.6 G/DL (ref 32–36)
MCV RBC AUTO: 90 FL (ref 82–98)
NEUTROPHILS # BLD AUTO: 2.9 K/UL (ref 1.8–7.7)
PLATELET # BLD AUTO: 234 K/UL (ref 150–450)
PMV BLD AUTO: 10.6 FL (ref 9.2–12.9)
POTASSIUM SERPL-SCNC: 3.9 MMOL/L (ref 3.5–5.1)
PROT SERPL-MCNC: 7 G/DL (ref 6–8.4)
RBC # BLD AUTO: 4.02 M/UL (ref 4–5.4)
SODIUM SERPL-SCNC: 139 MMOL/L (ref 136–145)
WBC # BLD AUTO: 4.94 K/UL (ref 3.9–12.7)

## 2024-06-12 PROCEDURE — 36415 COLL VENOUS BLD VENIPUNCTURE: CPT | Mod: HCNC,PN | Performed by: OBSTETRICS & GYNECOLOGY

## 2024-06-12 PROCEDURE — 85027 COMPLETE CBC AUTOMATED: CPT | Mod: HCNC | Performed by: OBSTETRICS & GYNECOLOGY

## 2024-06-12 PROCEDURE — 86304 IMMUNOASSAY TUMOR CA 125: CPT | Mod: HCNC | Performed by: OBSTETRICS & GYNECOLOGY

## 2024-06-12 PROCEDURE — 80053 COMPREHEN METABOLIC PANEL: CPT | Mod: HCNC | Performed by: OBSTETRICS & GYNECOLOGY

## 2024-06-12 NOTE — PROGRESS NOTES
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  Washings negative  ER/IA+  HER2 fish negative  P53+  MMRp     48 pretreatment    Discussed adjuvant chemotherapy with carbo/taxol    C1 - 2024  C2 - 2024  C3 -    Today's visit  Presents today for evaluation prior to cycle #3 Paclitaxel/Carboplatin. Labs reviewed and appropriate for treatment. Tolerating with anticipated side effects and no dose limiting toxicities. C2 delayed due to difficulty obtaining IV access.     Referral to radiation oncology for consideration of VCBT. MBP in [place].     Lab Results   Component Value Date/Time     22 2024 08:30 AM     18 05/15/2024 08:38 AM     18 2024 08:30 AM      Referral History  Pricilla Bower is a 64 y.o.  female referred by Breana Avilez NP for new diagnosis of high-grade serous endometrial adenocarcinoma. She presented to her PCP 24 with complaints of post menopausal bleeding and bulging sensation in pelvic area.     24 EMB-   High-grade serous carcinoma. ER/IA+, HER2 equivocal, p16+, P53+   HER2 fish negative    24 Pelvic US-   Uterus is retroflexed or retroverted length 10 cm. Endometrium heterogeneous appears partially cystic thickened up to 3 cm with vascular flow. Right ovary unremarkable in the left is not seen.     Today she reports occasional pelvic pressure but denies continued vaginal bleeding. She denies changes in her bowel or bladder habits. She has had SVDx2; denies any previous abdominal or pelvic surgeries. She has a family history of breast ad cervical cancer in her mother. Denies any family history of endometrial  "or ovarian cancer. She is up to date on Pap screening and denies history of abnormal results. She has never had colon cancer screening; is scheduled for colonoscopy in November.      Review of Systems   Constitutional:  Negative for appetite change, chills and fever.   Respiratory:  Negative for cough and shortness of breath.    Cardiovascular:  Negative for chest pain.   Gastrointestinal:  Negative for abdominal distention, abdominal pain, constipation, diarrhea and vomiting.   Genitourinary:  Negative for difficulty urinating, pelvic pain, vaginal bleeding and vaginal discharge.    Skin: Negative.    Hematological:  Negative for adenopathy.   Psychiatric/Behavioral: Negative.       OBJECTIVE     BP (!) 152/80 (BP Location: Right arm, Patient Position: Sitting, BP Method: Medium (Automatic))   Pulse 87   Temp 96.1 °F (35.6 °C) (Temporal)   Resp 17   Ht 5' 5" (1.651 m)   Wt 83 kg (182 lb 15.7 oz)   SpO2 98%   BMI 30.45 kg/m²     Physical Exam  Constitutional:       Appearance: Normal appearance.   HENT:      Nose: Nose normal.   Cardiovascular:      Rate and Rhythm: Normal rate.   Abdominal:      General: Abdomen is flat.      Palpations: Abdomen is soft.   Musculoskeletal:         General: Normal range of motion.   Neurological:      General: No focal deficit present.      Mental Status: She is alert. Mental status is at baseline.   Skin:     General: Skin is dry.   Psychiatric:         Mood and Affect: Mood normal.         Behavior: Behavior normal.     ASSESSMENT     1. Primary serous adenocarcinoma of endometrium  - Ambulatory referral/consult to Radiation Oncology; Future    2. Examination prior to chemotherapy        PLAN     Okay to treat C3 as above  RTC prechemo or sooner if needed.     I spent approximately 45 minutes reviewing the available records and evaluating the patient, out of which over 50% of the time was spent face to face with the patient in counseling and coordinating this patient's " care.

## 2024-06-13 ENCOUNTER — INFUSION (OUTPATIENT)
Dept: INFUSION THERAPY | Facility: HOSPITAL | Age: 64
End: 2024-06-13
Attending: OBSTETRICS & GYNECOLOGY
Payer: MEDICARE

## 2024-06-13 ENCOUNTER — OFFICE VISIT (OUTPATIENT)
Dept: GYNECOLOGIC ONCOLOGY | Facility: CLINIC | Age: 64
End: 2024-06-13
Payer: MEDICARE

## 2024-06-13 VITALS
RESPIRATION RATE: 18 BRPM | WEIGHT: 183 LBS | HEIGHT: 65 IN | OXYGEN SATURATION: 98 % | HEART RATE: 100 BPM | TEMPERATURE: 98 F | SYSTOLIC BLOOD PRESSURE: 134 MMHG | BODY MASS INDEX: 30.49 KG/M2 | DIASTOLIC BLOOD PRESSURE: 84 MMHG

## 2024-06-13 VITALS
SYSTOLIC BLOOD PRESSURE: 152 MMHG | HEART RATE: 87 BPM | RESPIRATION RATE: 17 BRPM | WEIGHT: 183 LBS | BODY MASS INDEX: 30.49 KG/M2 | TEMPERATURE: 96 F | DIASTOLIC BLOOD PRESSURE: 80 MMHG | HEIGHT: 65 IN | OXYGEN SATURATION: 98 %

## 2024-06-13 DIAGNOSIS — C54.1 PRIMARY SEROUS ADENOCARCINOMA OF ENDOMETRIUM: Primary | ICD-10-CM

## 2024-06-13 DIAGNOSIS — Z01.818 EXAMINATION PRIOR TO CHEMOTHERAPY: ICD-10-CM

## 2024-06-13 PROCEDURE — 96417 CHEMO IV INFUS EACH ADDL SEQ: CPT | Mod: HCNC,PN

## 2024-06-13 PROCEDURE — 96415 CHEMO IV INFUSION ADDL HR: CPT | Mod: HCNC,PN

## 2024-06-13 PROCEDURE — 96367 TX/PROPH/DG ADDL SEQ IV INF: CPT | Mod: HCNC,PN

## 2024-06-13 PROCEDURE — 25000003 PHARM REV CODE 250: Mod: HCNC,PN | Performed by: OBSTETRICS & GYNECOLOGY

## 2024-06-13 PROCEDURE — 63600175 PHARM REV CODE 636 W HCPCS: Mod: HCNC,PN | Performed by: OBSTETRICS & GYNECOLOGY

## 2024-06-13 PROCEDURE — 96375 TX/PRO/DX INJ NEW DRUG ADDON: CPT | Mod: HCNC,PN

## 2024-06-13 PROCEDURE — 96413 CHEMO IV INFUSION 1 HR: CPT | Mod: HCNC,PN

## 2024-06-13 PROCEDURE — 99999 PR PBB SHADOW E&M-EST. PATIENT-LVL V: CPT | Mod: PBBFAC,HCNC,, | Performed by: OBSTETRICS & GYNECOLOGY

## 2024-06-13 RX ORDER — EPINEPHRINE 0.3 MG/.3ML
0.3 INJECTION SUBCUTANEOUS ONCE AS NEEDED
Status: DISCONTINUED | OUTPATIENT
Start: 2024-06-13 | End: 2024-06-13 | Stop reason: HOSPADM

## 2024-06-13 RX ORDER — HEPARIN 100 UNIT/ML
500 SYRINGE INTRAVENOUS
Status: CANCELLED | OUTPATIENT
Start: 2024-06-13

## 2024-06-13 RX ORDER — FAMOTIDINE 10 MG/ML
20 INJECTION INTRAVENOUS
Status: CANCELLED | OUTPATIENT
Start: 2024-06-13

## 2024-06-13 RX ORDER — SODIUM CHLORIDE 0.9 % (FLUSH) 0.9 %
10 SYRINGE (ML) INJECTION
Status: DISCONTINUED | OUTPATIENT
Start: 2024-06-13 | End: 2024-06-13 | Stop reason: HOSPADM

## 2024-06-13 RX ORDER — PROCHLORPERAZINE EDISYLATE 5 MG/ML
5 INJECTION INTRAMUSCULAR; INTRAVENOUS ONCE AS NEEDED
Status: CANCELLED | OUTPATIENT
Start: 2024-06-13

## 2024-06-13 RX ORDER — HEPARIN 100 UNIT/ML
500 SYRINGE INTRAVENOUS
Status: DISCONTINUED | OUTPATIENT
Start: 2024-06-13 | End: 2024-06-13 | Stop reason: HOSPADM

## 2024-06-13 RX ORDER — DIPHENHYDRAMINE HYDROCHLORIDE 50 MG/ML
50 INJECTION INTRAMUSCULAR; INTRAVENOUS ONCE AS NEEDED
Status: DISCONTINUED | OUTPATIENT
Start: 2024-06-13 | End: 2024-06-13 | Stop reason: HOSPADM

## 2024-06-13 RX ORDER — FAMOTIDINE 10 MG/ML
20 INJECTION INTRAVENOUS
Status: COMPLETED | OUTPATIENT
Start: 2024-06-13 | End: 2024-06-13

## 2024-06-13 RX ORDER — PROCHLORPERAZINE EDISYLATE 5 MG/ML
5 INJECTION INTRAMUSCULAR; INTRAVENOUS ONCE AS NEEDED
Status: DISCONTINUED | OUTPATIENT
Start: 2024-06-13 | End: 2024-06-13 | Stop reason: HOSPADM

## 2024-06-13 RX ORDER — SODIUM CHLORIDE 0.9 % (FLUSH) 0.9 %
10 SYRINGE (ML) INJECTION
Status: CANCELLED | OUTPATIENT
Start: 2024-06-13

## 2024-06-13 RX ORDER — DIPHENHYDRAMINE HYDROCHLORIDE 50 MG/ML
50 INJECTION INTRAMUSCULAR; INTRAVENOUS ONCE AS NEEDED
Status: CANCELLED | OUTPATIENT
Start: 2024-06-13

## 2024-06-13 RX ORDER — EPINEPHRINE 0.3 MG/.3ML
0.3 INJECTION SUBCUTANEOUS ONCE AS NEEDED
Status: CANCELLED | OUTPATIENT
Start: 2024-06-13

## 2024-06-13 RX ADMIN — PACLITAXEL 342 MG: 6 INJECTION, SOLUTION INTRAVENOUS at 10:06

## 2024-06-13 RX ADMIN — SODIUM CHLORIDE: 9 INJECTION, SOLUTION INTRAVENOUS at 09:06

## 2024-06-13 RX ADMIN — APREPITANT 130 MG: 130 INJECTION, EMULSION INTRAVENOUS at 09:06

## 2024-06-13 RX ADMIN — CARBOPLATIN 625 MG: 10 INJECTION, SOLUTION INTRAVENOUS at 01:06

## 2024-06-13 RX ADMIN — FAMOTIDINE 20 MG: 10 INJECTION INTRAVENOUS at 10:06

## 2024-06-13 RX ADMIN — DEXAMETHASONE SODIUM PHOSPHATE 0.25 MG: 10 INJECTION, SOLUTION INTRAMUSCULAR; INTRAVENOUS at 09:06

## 2024-06-13 RX ADMIN — DIPHENHYDRAMINE HYDROCHLORIDE 50 MG: 50 INJECTION, SOLUTION INTRAMUSCULAR; INTRAVENOUS at 10:06

## 2024-07-01 ENCOUNTER — LAB VISIT (OUTPATIENT)
Dept: LAB | Facility: HOSPITAL | Age: 64
End: 2024-07-01
Attending: OBSTETRICS & GYNECOLOGY
Payer: MEDICARE

## 2024-07-01 DIAGNOSIS — Z51.11 ENCOUNTER FOR CHEMOTHERAPY MANAGEMENT: ICD-10-CM

## 2024-07-01 DIAGNOSIS — C54.1 ENDOMETRIAL CANCER: ICD-10-CM

## 2024-07-01 LAB
ALBUMIN SERPL BCP-MCNC: 3.7 G/DL (ref 3.5–5.2)
ALP SERPL-CCNC: 69 U/L (ref 55–135)
ALT SERPL W/O P-5'-P-CCNC: 37 U/L (ref 10–44)
ANION GAP SERPL CALC-SCNC: 12 MMOL/L (ref 8–16)
AST SERPL-CCNC: 26 U/L (ref 10–40)
BILIRUB SERPL-MCNC: 0.3 MG/DL (ref 0.1–1)
BUN SERPL-MCNC: 8 MG/DL (ref 8–23)
CALCIUM SERPL-MCNC: 9.4 MG/DL (ref 8.7–10.5)
CANCER AG125 SERPL-ACNC: 20 U/ML (ref 0–30)
CHLORIDE SERPL-SCNC: 103 MMOL/L (ref 95–110)
CO2 SERPL-SCNC: 24 MMOL/L (ref 23–29)
CREAT SERPL-MCNC: 0.8 MG/DL (ref 0.5–1.4)
ERYTHROCYTE [DISTWIDTH] IN BLOOD BY AUTOMATED COUNT: 15.9 % (ref 11.5–14.5)
EST. GFR  (NO RACE VARIABLE): >60 ML/MIN/1.73 M^2
GLUCOSE SERPL-MCNC: 308 MG/DL (ref 70–110)
HCT VFR BLD AUTO: 35.4 % (ref 37–48.5)
HGB BLD-MCNC: 11.9 G/DL (ref 12–16)
IMM GRANULOCYTES # BLD AUTO: 0.02 K/UL (ref 0–0.04)
MCH RBC QN AUTO: 30.4 PG (ref 27–31)
MCHC RBC AUTO-ENTMCNC: 33.6 G/DL (ref 32–36)
MCV RBC AUTO: 90 FL (ref 82–98)
NEUTROPHILS # BLD AUTO: 1.7 K/UL (ref 1.8–7.7)
PLATELET # BLD AUTO: 217 K/UL (ref 150–450)
PMV BLD AUTO: 10.7 FL (ref 9.2–12.9)
POTASSIUM SERPL-SCNC: 3.9 MMOL/L (ref 3.5–5.1)
PROT SERPL-MCNC: 6.7 G/DL (ref 6–8.4)
RBC # BLD AUTO: 3.92 M/UL (ref 4–5.4)
SODIUM SERPL-SCNC: 139 MMOL/L (ref 136–145)
WBC # BLD AUTO: 3.15 K/UL (ref 3.9–12.7)

## 2024-07-01 PROCEDURE — 80053 COMPREHEN METABOLIC PANEL: CPT | Mod: HCNC | Performed by: OBSTETRICS & GYNECOLOGY

## 2024-07-01 PROCEDURE — 86304 IMMUNOASSAY TUMOR CA 125: CPT | Mod: HCNC | Performed by: OBSTETRICS & GYNECOLOGY

## 2024-07-01 PROCEDURE — 36415 COLL VENOUS BLD VENIPUNCTURE: CPT | Mod: HCNC,PN | Performed by: OBSTETRICS & GYNECOLOGY

## 2024-07-01 PROCEDURE — 85027 COMPLETE CBC AUTOMATED: CPT | Mod: HCNC | Performed by: OBSTETRICS & GYNECOLOGY

## 2024-07-02 ENCOUNTER — TELEPHONE (OUTPATIENT)
Dept: GYNECOLOGIC ONCOLOGY | Facility: CLINIC | Age: 64
End: 2024-07-02
Payer: MEDICARE

## 2024-07-02 NOTE — TELEPHONE ENCOUNTER
"Dr Goldberg desires pt to have vaginal cuff brachytherapy radiation at [facility] in [place]. After referral was sent the pt's insurance was not covered there because it was "[facility]" and they are affiliated with [facility]. She said the "[facility]" would take her but they don't do brachy. Dr Goldberg updated. New orders to send her to [facility] in [place] or in [facility]. Pt preferred [place]. Referral sent. Confirmation of fax received 5180am  "

## 2024-07-02 NOTE — PROGRESS NOTES
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  Washings negative  ER/ID+  HER2 fish negative  P53+  MMRp     48 pretreatment    Discussed adjuvant chemotherapy with carbo/taxol    C1 - 4/25/2024  C2 - 5/20/2024  C3 - 6/13/2024  C4 -     Today's visit  Presents today for evaluation prior to cycle #4 Paclitaxel/Carboplatin. Labs reviewed and appropriate for treatment. Tolerating with anticipated side effects and no dose limiting toxicities. Endorses intermittent neuropathy on the bottom of her feet. Blood glucose elevated on recent metabolic panel.     Referral to radiation oncology for consideration of VCBT. MBP in [place].     Lab Results   Component Value Date/Time     20 07/01/2024 08:36 AM     22 06/12/2024 08:30 AM     18 05/15/2024 08:38 AM      Referral History  Referred by Breana Avilez NP for new diagnosis of high-grade serous endometrial adenocarcinoma. She presented to her PCP 2/2/24 with complaints of post menopausal bleeding and bulging sensation in pelvic area.     2/7/24 EMB-   High-grade serous carcinoma. ER/ID+, HER2 equivocal, p16+, P53+   HER2 fish negative    2/7/24 Pelvic US-   Uterus is retroflexed or retroverted length 10 cm. Endometrium heterogeneous appears partially cystic thickened up to 3 cm with vascular flow. Right ovary unremarkable in the left is not seen.     Today she reports occasional pelvic pressure but denies continued vaginal bleeding. She denies changes in her bowel or bladder habits. She has had SVDx2; denies any previous abdominal or pelvic surgeries. She has a family history of breast ad cervical cancer in her mother. Denies any family history of endometrial or ovarian cancer.  "She is up to date on Pap screening and denies history of abnormal results. She has never had colon cancer screening; is scheduled for colonoscopy in November.      Review of Systems   Constitutional:  Negative for appetite change, chills and fever.   Respiratory:  Negative for cough and shortness of breath.    Cardiovascular:  Negative for chest pain.   Gastrointestinal:  Negative for abdominal distention, abdominal pain, constipation, diarrhea and vomiting.   Genitourinary:  Negative for difficulty urinating, pelvic pain, vaginal bleeding and vaginal discharge.    Skin: Negative.    Hematological:  Negative for adenopathy.   Psychiatric/Behavioral: Negative.       OBJECTIVE     /70 (BP Location: Left arm, Patient Position: Sitting, BP Method: Medium (Automatic))   Pulse 88   Temp 96.1 °F (35.6 °C) (Temporal)   Resp 18   Ht 5' 6" (1.676 m)   Wt 81.8 kg (180 lb 5.4 oz)   SpO2 98%   BMI 29.11 kg/m²     Physical Exam  Constitutional:       Appearance: Normal appearance.   HENT:      Nose: Nose normal.   Cardiovascular:      Rate and Rhythm: Normal rate.   Abdominal:      General: Abdomen is flat.      Palpations: Abdomen is soft.   Musculoskeletal:         General: Normal range of motion.   Neurological:      General: No focal deficit present.      Mental Status: She is alert. Mental status is at baseline.   Skin:     General: Skin is dry.   Psychiatric:         Mood and Affect: Mood normal.         Behavior: Behavior normal.       ASSESSMENT     1. Primary serous adenocarcinoma of endometrium    2. Examination prior to chemotherapy    3. Type 2 diabetes mellitus with hyperglycemia, without long-term current use of insulin      PLAN     Okay to treat C4 as above    Patient to contact PCP for blood glucose management. Currently on Metformin and Mounjaro.     RTC prechemo or sooner if needed.     I spent approximately 45 minutes reviewing the available records and evaluating the patient, out of which over " 50% of the time was spent face to face with the patient in counseling and coordinating this patient's care.

## 2024-07-03 ENCOUNTER — INFUSION (OUTPATIENT)
Dept: INFUSION THERAPY | Facility: HOSPITAL | Age: 64
End: 2024-07-03
Attending: OBSTETRICS & GYNECOLOGY
Payer: MEDICARE

## 2024-07-03 ENCOUNTER — DOCUMENTATION ONLY (OUTPATIENT)
Dept: INFUSION THERAPY | Facility: HOSPITAL | Age: 64
End: 2024-07-03
Payer: MEDICARE

## 2024-07-03 ENCOUNTER — OFFICE VISIT (OUTPATIENT)
Dept: GYNECOLOGIC ONCOLOGY | Facility: CLINIC | Age: 64
End: 2024-07-03
Payer: MEDICARE

## 2024-07-03 ENCOUNTER — TELEPHONE (OUTPATIENT)
Dept: INTERNAL MEDICINE | Facility: CLINIC | Age: 64
End: 2024-07-03
Payer: MEDICARE

## 2024-07-03 VITALS
SYSTOLIC BLOOD PRESSURE: 135 MMHG | DIASTOLIC BLOOD PRESSURE: 66 MMHG | TEMPERATURE: 96 F | BODY MASS INDEX: 28.98 KG/M2 | HEIGHT: 66 IN | HEART RATE: 99 BPM | RESPIRATION RATE: 16 BRPM | OXYGEN SATURATION: 98 % | WEIGHT: 180.31 LBS

## 2024-07-03 VITALS
DIASTOLIC BLOOD PRESSURE: 70 MMHG | HEART RATE: 88 BPM | TEMPERATURE: 96 F | BODY MASS INDEX: 28.98 KG/M2 | WEIGHT: 180.31 LBS | HEIGHT: 66 IN | RESPIRATION RATE: 18 BRPM | SYSTOLIC BLOOD PRESSURE: 135 MMHG | OXYGEN SATURATION: 98 %

## 2024-07-03 DIAGNOSIS — E11.65 TYPE 2 DIABETES MELLITUS WITH HYPERGLYCEMIA, WITHOUT LONG-TERM CURRENT USE OF INSULIN: ICD-10-CM

## 2024-07-03 DIAGNOSIS — C54.1 PRIMARY SEROUS ADENOCARCINOMA OF ENDOMETRIUM: Primary | ICD-10-CM

## 2024-07-03 DIAGNOSIS — Z01.818 EXAMINATION PRIOR TO CHEMOTHERAPY: ICD-10-CM

## 2024-07-03 PROBLEM — G62.9 PERIPHERAL NEUROPATHY: Status: ACTIVE | Noted: 2024-07-03

## 2024-07-03 PROBLEM — G62.9 PERIPHERAL NEUROPATHY: Status: RESOLVED | Noted: 2024-07-03 | Resolved: 2024-07-03

## 2024-07-03 PROCEDURE — 63600175 PHARM REV CODE 636 W HCPCS: Mod: HCNC,PN

## 2024-07-03 PROCEDURE — 96413 CHEMO IV INFUSION 1 HR: CPT | Mod: HCNC,PN

## 2024-07-03 PROCEDURE — 96417 CHEMO IV INFUS EACH ADDL SEQ: CPT | Mod: HCNC,PN

## 2024-07-03 PROCEDURE — 25000003 PHARM REV CODE 250: Mod: HCNC,PN

## 2024-07-03 PROCEDURE — 96415 CHEMO IV INFUSION ADDL HR: CPT | Mod: HCNC,PN

## 2024-07-03 PROCEDURE — 99999 PR PBB SHADOW E&M-EST. PATIENT-LVL IV: CPT | Mod: PBBFAC,HCNC,,

## 2024-07-03 PROCEDURE — 96375 TX/PRO/DX INJ NEW DRUG ADDON: CPT | Mod: HCNC,PN

## 2024-07-03 PROCEDURE — 96367 TX/PROPH/DG ADDL SEQ IV INF: CPT | Mod: HCNC,PN

## 2024-07-03 RX ORDER — FAMOTIDINE 10 MG/ML
20 INJECTION INTRAVENOUS
Status: CANCELLED | OUTPATIENT
Start: 2024-07-03

## 2024-07-03 RX ORDER — SODIUM CHLORIDE 0.9 % (FLUSH) 0.9 %
10 SYRINGE (ML) INJECTION
Status: CANCELLED | OUTPATIENT
Start: 2024-07-03

## 2024-07-03 RX ORDER — DIPHENHYDRAMINE HYDROCHLORIDE 50 MG/ML
50 INJECTION INTRAMUSCULAR; INTRAVENOUS ONCE AS NEEDED
Status: CANCELLED | OUTPATIENT
Start: 2024-07-03

## 2024-07-03 RX ORDER — FAMOTIDINE 10 MG/ML
20 INJECTION INTRAVENOUS
Status: COMPLETED | OUTPATIENT
Start: 2024-07-03 | End: 2024-07-03

## 2024-07-03 RX ORDER — EPINEPHRINE 0.3 MG/.3ML
0.3 INJECTION SUBCUTANEOUS ONCE AS NEEDED
Status: CANCELLED | OUTPATIENT
Start: 2024-07-03

## 2024-07-03 RX ORDER — PROCHLORPERAZINE EDISYLATE 5 MG/ML
5 INJECTION INTRAMUSCULAR; INTRAVENOUS ONCE AS NEEDED
Status: CANCELLED | OUTPATIENT
Start: 2024-07-03

## 2024-07-03 RX ORDER — HEPARIN 100 UNIT/ML
500 SYRINGE INTRAVENOUS
Status: CANCELLED | OUTPATIENT
Start: 2024-07-03

## 2024-07-03 RX ADMIN — PACLITAXEL 342 MG: 6 INJECTION, SOLUTION INTRAVENOUS at 10:07

## 2024-07-03 RX ADMIN — FAMOTIDINE 20 MG: 10 INJECTION INTRAVENOUS at 10:07

## 2024-07-03 RX ADMIN — DIPHENHYDRAMINE HYDROCHLORIDE 50 MG: 50 INJECTION, SOLUTION INTRAMUSCULAR; INTRAVENOUS at 10:07

## 2024-07-03 RX ADMIN — APREPITANT 130 MG: 130 INJECTION, EMULSION INTRAVENOUS at 09:07

## 2024-07-03 RX ADMIN — CARBOPLATIN 685 MG: 10 INJECTION, SOLUTION INTRAVENOUS at 01:07

## 2024-07-03 RX ADMIN — DEXAMETHASONE SODIUM PHOSPHATE 0.25 MG: 10 INJECTION, SOLUTION INTRAMUSCULAR; INTRAVENOUS at 09:07

## 2024-07-03 RX ADMIN — SODIUM CHLORIDE: 9 INJECTION, SOLUTION INTRAVENOUS at 09:07

## 2024-07-03 NOTE — PROGRESS NOTES
ONCOLOGY NUTRITION   FOLLOW UP VISIT        Pricilla Bower is a 64 y.o. female.  DATE: 07/03/2024        Oncology Diagnosis: Adenocarcinoma of endometrium     REFERRAL FROM:   [] Integrative Oncology   [] Med/Heme Oncology  [] Radiation Oncology  [] Surgical Oncology   [] Infusion Nurse    [x] Routine Nutrition follow up    TREATMENT PLAN:   [] Full treatment plan pending  [x] Chemotherapy  [] Immunotherapy  [] Radiation  [] Concurrent  [] Surgery  [] Treatment complete/post-treatment    ANTHROPOMETRICS:  Wt Readings from Last 10 Encounters:   07/03/24 81.8 kg (180 lb 5.4 oz)   07/03/24 81.8 kg (180 lb 5.4 oz)   06/13/24 83 kg (182 lb 15.7 oz)   06/13/24 83 kg (182 lb 15.7 oz)   05/20/24 83.9 kg (184 lb 15.5 oz)   05/16/24 83 kg (182 lb 15.7 oz)   05/16/24 83 kg (182 lb 15.7 oz)   04/25/24 81.8 kg (180 lb 5.4 oz)   04/25/24 81.8 kg (180 lb 5.4 oz)   04/18/24 81.1 kg (178 lb 12.7 oz)      Weight Changes:  fluctuating ~2# but overall stable    PHYSICAL EXAM:  Muscle Wasting Observed:  [x] No Deficit   [] Mild Deficit   [] Moderate   [] Severe    INTAKE:  [x] PO Intake [] TF Intake  Current Diet: regular diet  Dietary Patterns:  Eating meals/snacks as tolerated  [] Oral nutritional supplements:     SYMPTOMS/COMPLAINTS:   [x] No nutritional concerns at current  [] Diarrhea                    [] Constipation           [] Nausea                 [] Vomiting                [] Indigestion                [] Reflux              [] Poor Appetite            [] Anorexia                 [] Early Satiety         [] Gas                       [] Bloating                     [] Dry Mouth    [] Mucositis                   [] Mouth Sores           [] Poor Dentition      [] Difficulty chewing  [] Difficulty Swallowing   [] Pain with swallowing [] Change in taste      [] Change in smell   [] Pain (general)       [] Fatigue                      [] Sleep issues    [] Weight loss  [] other, please specify-     Nutrition Re-Assessment  Risk: low risk    [x] Labs reviewed   [x] Meds reviewed    Education Provided:   [x] No Education Needed at this time  [] Diarrhea                                              [] Constipation                          [] Nausea/Vomiting  [] Mucositis                [] Dry Mouth    [] Dealing with changes in Taste/Smell  [] Dealing with Poor Appetite   [] Soft/moist Diet      [] Weight Loss/Gain     [] Weight Maintenance                           [] Indigestion/GERD                 [] Gas/Bloating          [] Foods High/ Low in specific nutrients [] Increasing Calories/Protein   [] Milkshake/Smoothies Recipes   [] Nutrition Supplements                        [] Increasing Fluid Intakes         [] Foods that fight cancer    [] Evidence bases resources                 [] Fermented Foods/Probiotics  [] Mediterranean/Plant Based Diet     [] Other, specify                                   [] Handouts provided      [] Samples provided     RD NOTE:  RD met with pt at chairside during infusion tx. Pt present for cycle 4 Taxol/Carbo. Pt continues to do well nutritionally - maintaining weight, chewing without difficulty and denies any nutrition related side effects.         RD Goals:   [x] Weight stable                  [] Weight gain                      [] Weight Loss                               [] Continue adequate Kcal/protein   [] Increase Kcal/protein      [] Adjust Tube-feeding Rx   [] Tolerate Tube Feedings             [] Increase tube feedings to goal     [] Tolerate Supplements     [] Symptom Improvement   [] Understand nutrition Education  [x] Offer supportive visits   [] other, please specify    RECOMMENDATIONS:  Continue current fluid intake to maintain proper hydration   Continue current calorie/protein intake to maintain body weight     Follow up: PRN throughout tx    Shahla Valderrama RDN, LDN  07/03/2024  10:37 AM

## 2024-07-03 NOTE — PROGRESS NOTES
LCSW met with patient at the chairside during infusion. Patient denied any emotional or practical needs at this time.

## 2024-07-03 NOTE — TELEPHONE ENCOUNTER
----- Message from Sienna Gale sent at 7/3/2024  9:21 AM CDT -----  Regarding: concerns  Name of who is calling:  Pricilla yoder       What is the request in detail: Pt is requesting a call back in ref to blood sugars are high / ck portal / and needs clarification to find out what needs to be done / meds or be seen asap      Can the clinic reply by [facility]:yes      What number to call back if not [facility]: [phone]

## 2024-07-03 NOTE — TELEPHONE ENCOUNTER
Spoke with patient, she stated she needs to know what to do about her sugars. Chemo told her to reach out to PCP to review the labs completed on 07/01/2024 due to glucose being up.

## 2024-07-08 NOTE — TELEPHONE ENCOUNTER
Requested Prescriptions     Pending Prescriptions Disp Refills    MOUNJARO 2.5 mg/0.5 mL PnIj       Sig: Inject 2.5 mg into the skin every Thursday.     Received refill request from patient's pharmacy     Last visit- 02/23/24  Last filled-11/25/2023  Next visit- 08/23/2024    Please review and advise.

## 2024-07-09 ENCOUNTER — DOCUMENTATION ONLY (OUTPATIENT)
Dept: INFUSION THERAPY | Facility: HOSPITAL | Age: 64
End: 2024-07-09
Payer: MEDICARE

## 2024-07-09 RX ORDER — TIRZEPATIDE 2.5 MG/.5ML
2.5 INJECTION, SOLUTION SUBCUTANEOUS
Qty: 4 PEN | Refills: 5 | Status: SHIPPED | OUTPATIENT
Start: 2024-07-11

## 2024-07-09 NOTE — PROGRESS NOTES
Patient called to inquire about gas card program. LCSW told patient that she would need to bring in proof of income or social security award letter to be approved for the [employer] malaika. Patient was thankful.

## 2024-07-12 ENCOUNTER — TELEPHONE (OUTPATIENT)
Dept: INTERNAL MEDICINE | Facility: CLINIC | Age: 64
End: 2024-07-12
Payer: MEDICARE

## 2024-07-12 ENCOUNTER — PATIENT MESSAGE (OUTPATIENT)
Dept: GYNECOLOGIC ONCOLOGY | Facility: CLINIC | Age: 64
End: 2024-07-12
Payer: MEDICARE

## 2024-07-12 NOTE — TELEPHONE ENCOUNTER
Pt is reporting her BGL is elevated with a home reading of 300.  I see pt is prescribed steroids to take in conjunction with Tx through [facility].  Is this something we need to address in primary care or is this something [facility] needs to monitor due to pts treatment regimen?

## 2024-07-15 ENCOUNTER — OFFICE VISIT (OUTPATIENT)
Dept: INTERNAL MEDICINE | Facility: CLINIC | Age: 64
End: 2024-07-15
Payer: MEDICARE

## 2024-07-15 VITALS
HEIGHT: 66 IN | OXYGEN SATURATION: 99 % | HEART RATE: 104 BPM | DIASTOLIC BLOOD PRESSURE: 76 MMHG | BODY MASS INDEX: 27.97 KG/M2 | TEMPERATURE: 99 F | WEIGHT: 174.06 LBS | SYSTOLIC BLOOD PRESSURE: 110 MMHG | RESPIRATION RATE: 18 BRPM

## 2024-07-15 DIAGNOSIS — Z00.00 ROUTINE MEDICAL EXAM: Primary | ICD-10-CM

## 2024-07-15 DIAGNOSIS — E78.2 MIXED HYPERLIPIDEMIA: ICD-10-CM

## 2024-07-15 DIAGNOSIS — Z79.4 TYPE 2 DIABETES MELLITUS WITH HYPERGLYCEMIA, WITH LONG-TERM CURRENT USE OF INSULIN: ICD-10-CM

## 2024-07-15 DIAGNOSIS — E11.65 TYPE 2 DIABETES MELLITUS WITH HYPERGLYCEMIA, WITH LONG-TERM CURRENT USE OF INSULIN: ICD-10-CM

## 2024-07-15 PROCEDURE — 99213 OFFICE O/P EST LOW 20 MIN: CPT | Mod: HCNC,S$GLB,, | Performed by: NURSE PRACTITIONER

## 2024-07-15 PROCEDURE — 3074F SYST BP LT 130 MM HG: CPT | Mod: HCNC,CPTII,S$GLB, | Performed by: NURSE PRACTITIONER

## 2024-07-15 PROCEDURE — 3008F BODY MASS INDEX DOCD: CPT | Mod: HCNC,CPTII,S$GLB, | Performed by: NURSE PRACTITIONER

## 2024-07-15 PROCEDURE — 3044F HG A1C LEVEL LT 7.0%: CPT | Mod: HCNC,CPTII,S$GLB, | Performed by: NURSE PRACTITIONER

## 2024-07-15 PROCEDURE — 1126F AMNT PAIN NOTED NONE PRSNT: CPT | Mod: HCNC,CPTII,S$GLB, | Performed by: NURSE PRACTITIONER

## 2024-07-15 PROCEDURE — 3078F DIAST BP <80 MM HG: CPT | Mod: HCNC,CPTII,S$GLB, | Performed by: NURSE PRACTITIONER

## 2024-07-15 PROCEDURE — 3288F FALL RISK ASSESSMENT DOCD: CPT | Mod: HCNC,CPTII,S$GLB, | Performed by: NURSE PRACTITIONER

## 2024-07-15 PROCEDURE — 99999 PR PBB SHADOW E&M-EST. PATIENT-LVL V: CPT | Mod: PBBFAC,HCNC,, | Performed by: NURSE PRACTITIONER

## 2024-07-15 PROCEDURE — 1160F RVW MEDS BY RX/DR IN RCRD: CPT | Mod: HCNC,CPTII,S$GLB, | Performed by: NURSE PRACTITIONER

## 2024-07-15 PROCEDURE — 1159F MED LIST DOCD IN RCRD: CPT | Mod: HCNC,CPTII,S$GLB, | Performed by: NURSE PRACTITIONER

## 2024-07-15 PROCEDURE — 1101F PT FALLS ASSESS-DOCD LE1/YR: CPT | Mod: HCNC,CPTII,S$GLB, | Performed by: NURSE PRACTITIONER

## 2024-07-15 RX ORDER — PEN NEEDLE, DIABETIC 30 GX3/16"
1 NEEDLE, DISPOSABLE MISCELLANEOUS NIGHTLY
Qty: 30 EACH | Refills: 2 | Status: SHIPPED | OUTPATIENT
Start: 2024-07-15

## 2024-07-15 RX ORDER — INSULIN GLARGINE 100 [IU]/ML
15 INJECTION, SOLUTION SUBCUTANEOUS NIGHTLY
Qty: 15 ML | Refills: 2 | Status: SHIPPED | OUTPATIENT
Start: 2024-07-15

## 2024-07-15 NOTE — PROGRESS NOTES
"Subjective     Patient ID: Pricilla Bower is a 64 y.o. female.    Chief Complaint: Hyperglycemia (In 300's)    Patient presents for a follow up with diabetes.   Currently receiving chemo therapy-Uterine Cancer.  Blood glucose has been uncontrolled.   Taking Mounjaro and metformin.  Was on Levemir before when diabetes were uncontrolled.      GYN Oncology: Dr. Goldberg/  Chemo therapy:  carbo/taxol    Reports she's feeling well.  Some nausea after chemo but overall doing well.       Review of Systems   Constitutional:  Negative for chills, fatigue and fever.   Respiratory:  Negative for cough and shortness of breath.    Gastrointestinal:  Positive for abdominal pain. Negative for diarrhea.   Musculoskeletal:  Negative for arthralgias and gait problem.   Psychiatric/Behavioral:  Negative for agitation and confusion.           Objective     Physical Exam  Vitals reviewed.   Constitutional:       Appearance: Normal appearance.   HENT:      Head: Normocephalic.   Cardiovascular:      Rate and Rhythm: Normal rate and regular rhythm.   Pulmonary:      Effort: Pulmonary effort is normal.      Breath sounds: Normal breath sounds.   Musculoskeletal:         General: Normal range of motion.   Skin:     General: Skin is warm.   Neurological:      General: No focal deficit present.      Mental Status: She is alert.   Psychiatric:         Mood and Affect: Mood normal.         Behavior: Behavior normal.            Assessment and Plan     1. Routine medical exam    2. Mixed hyperlipidemia  Comments:  Stable.  Continue atorvastatin 40 mg.    3. Type 2 diabetes mellitus with hyperglycemia, with long-term current use of insulin  Comments:  Adding Lantus 15 units at beditme.  Orders:  -     insulin glargine U-100, Lantus, (LANTUS SOLOSTAR U-100 INSULIN) 100 unit/mL (3 mL) InPn pen; Inject 15 Units into the skin every evening.  Dispense: 15 mL; Refill: 2  -     pen needle, diabetic 32 gauge x 5/32" Ndle; 1 each by Misc.(Non-Drug; Combo " Route) route every evening.  Dispense: 30 each; Refill: 2      Adding routine labs to upcoming lab appointment          Follow up in about 1 month (around 8/15/2024).

## 2024-07-19 NOTE — TELEPHONE ENCOUNTER
Referral coordinator at [facility] contacted and confirmed that they are still awaiting feedback from the [facility] location to see if pt could have her treatments there despite being out of network. Pt scheduled to see Dr Goldberg this upcoming Thursday 7/25. Navigator will call [facility] back on Wednesday 7/24 for status update.

## 2024-07-22 ENCOUNTER — PATIENT OUTREACH (OUTPATIENT)
Dept: ADMINISTRATIVE | Facility: HOSPITAL | Age: 64
End: 2024-07-22
Payer: MEDICARE

## 2024-07-22 NOTE — PROGRESS NOTES
Lab visit report: Per chart review, patient has a lab appointment on 7/26/24 for recheck of diabetic labs.

## 2024-07-24 ENCOUNTER — LAB VISIT (OUTPATIENT)
Dept: LAB | Facility: HOSPITAL | Age: 64
End: 2024-07-24
Attending: OBSTETRICS & GYNECOLOGY
Payer: MEDICARE

## 2024-07-24 DIAGNOSIS — Z51.11 ENCOUNTER FOR CHEMOTHERAPY MANAGEMENT: ICD-10-CM

## 2024-07-24 DIAGNOSIS — C54.1 ENDOMETRIAL CANCER: ICD-10-CM

## 2024-07-24 DIAGNOSIS — E11.00 TYPE 2 DIABETES MELLITUS WITH HYPEROSMOLARITY WITHOUT COMA, WITHOUT LONG-TERM CURRENT USE OF INSULIN: ICD-10-CM

## 2024-07-24 LAB
ALBUMIN SERPL BCP-MCNC: 3.6 G/DL (ref 3.5–5.2)
ALBUMIN SERPL BCP-MCNC: 3.6 G/DL (ref 3.5–5.2)
ALP SERPL-CCNC: 66 U/L (ref 55–135)
ALP SERPL-CCNC: 66 U/L (ref 55–135)
ALT SERPL W/O P-5'-P-CCNC: 39 U/L (ref 10–44)
ALT SERPL W/O P-5'-P-CCNC: 39 U/L (ref 10–44)
ANION GAP SERPL CALC-SCNC: 10 MMOL/L (ref 8–16)
ANION GAP SERPL CALC-SCNC: 10 MMOL/L (ref 8–16)
AST SERPL-CCNC: 34 U/L (ref 10–40)
AST SERPL-CCNC: 34 U/L (ref 10–40)
BILIRUB SERPL-MCNC: 0.4 MG/DL (ref 0.1–1)
BILIRUB SERPL-MCNC: 0.4 MG/DL (ref 0.1–1)
BUN SERPL-MCNC: 9 MG/DL (ref 8–23)
BUN SERPL-MCNC: 9 MG/DL (ref 8–23)
CALCIUM SERPL-MCNC: 9.5 MG/DL (ref 8.7–10.5)
CALCIUM SERPL-MCNC: 9.5 MG/DL (ref 8.7–10.5)
CANCER AG125 SERPL-ACNC: 20 U/ML (ref 0–30)
CHLORIDE SERPL-SCNC: 104 MMOL/L (ref 95–110)
CHLORIDE SERPL-SCNC: 104 MMOL/L (ref 95–110)
CHOLEST SERPL-MCNC: 175 MG/DL (ref 120–199)
CHOLEST/HDLC SERPL: 4.5 {RATIO} (ref 2–5)
CO2 SERPL-SCNC: 25 MMOL/L (ref 23–29)
CO2 SERPL-SCNC: 25 MMOL/L (ref 23–29)
CREAT SERPL-MCNC: 0.8 MG/DL (ref 0.5–1.4)
CREAT SERPL-MCNC: 0.8 MG/DL (ref 0.5–1.4)
ERYTHROCYTE [DISTWIDTH] IN BLOOD BY AUTOMATED COUNT: 17.2 % (ref 11.5–14.5)
EST. GFR  (NO RACE VARIABLE): >60 ML/MIN/1.73 M^2
EST. GFR  (NO RACE VARIABLE): >60 ML/MIN/1.73 M^2
ESTIMATED AVG GLUCOSE: 272 MG/DL (ref 68–131)
GLUCOSE SERPL-MCNC: 193 MG/DL (ref 70–110)
GLUCOSE SERPL-MCNC: 193 MG/DL (ref 70–110)
HBA1C MFR BLD: 11.1 % (ref 4–5.6)
HCT VFR BLD AUTO: 37.1 % (ref 37–48.5)
HDLC SERPL-MCNC: 39 MG/DL (ref 40–75)
HDLC SERPL: 22.3 % (ref 20–50)
HGB BLD-MCNC: 12.1 G/DL (ref 12–16)
IMM GRANULOCYTES # BLD AUTO: 0.01 K/UL (ref 0–0.04)
LDLC SERPL CALC-MCNC: 104.2 MG/DL (ref 63–159)
MCH RBC QN AUTO: 31.2 PG (ref 27–31)
MCHC RBC AUTO-ENTMCNC: 32.6 G/DL (ref 32–36)
MCV RBC AUTO: 96 FL (ref 82–98)
NEUTROPHILS # BLD AUTO: 1.6 K/UL (ref 1.8–7.7)
NONHDLC SERPL-MCNC: 136 MG/DL
PLATELET # BLD AUTO: 260 K/UL (ref 150–450)
PMV BLD AUTO: 10.1 FL (ref 9.2–12.9)
POTASSIUM SERPL-SCNC: 3.8 MMOL/L (ref 3.5–5.1)
POTASSIUM SERPL-SCNC: 3.8 MMOL/L (ref 3.5–5.1)
PROT SERPL-MCNC: 6.8 G/DL (ref 6–8.4)
PROT SERPL-MCNC: 6.8 G/DL (ref 6–8.4)
RBC # BLD AUTO: 3.88 M/UL (ref 4–5.4)
SODIUM SERPL-SCNC: 139 MMOL/L (ref 136–145)
SODIUM SERPL-SCNC: 139 MMOL/L (ref 136–145)
TRIGL SERPL-MCNC: 159 MG/DL (ref 30–150)
TSH SERPL DL<=0.005 MIU/L-ACNC: 1.43 UIU/ML (ref 0.4–4)
WBC # BLD AUTO: 3.17 K/UL (ref 3.9–12.7)

## 2024-07-24 PROCEDURE — 80053 COMPREHEN METABOLIC PANEL: CPT | Mod: HCNC | Performed by: NURSE PRACTITIONER

## 2024-07-24 PROCEDURE — 36415 COLL VENOUS BLD VENIPUNCTURE: CPT | Mod: HCNC,PN | Performed by: NURSE PRACTITIONER

## 2024-07-24 PROCEDURE — 84443 ASSAY THYROID STIM HORMONE: CPT | Mod: HCNC | Performed by: NURSE PRACTITIONER

## 2024-07-24 PROCEDURE — 86304 IMMUNOASSAY TUMOR CA 125: CPT | Mod: HCNC | Performed by: OBSTETRICS & GYNECOLOGY

## 2024-07-24 PROCEDURE — 80061 LIPID PANEL: CPT | Mod: HCNC | Performed by: NURSE PRACTITIONER

## 2024-07-24 PROCEDURE — 83036 HEMOGLOBIN GLYCOSYLATED A1C: CPT | Mod: HCNC | Performed by: NURSE PRACTITIONER

## 2024-07-24 PROCEDURE — 85027 COMPLETE CBC AUTOMATED: CPT | Mod: HCNC | Performed by: OBSTETRICS & GYNECOLOGY

## 2024-07-24 NOTE — PROGRESS NOTES
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  Washings negative  ER/CO+  HER2 fish negative  P53+  MMRp     48 pretreatment    Discussed adjuvant chemotherapy with carbo/taxol    C1 - 4/25/2024  C2 - 5/20/2024  C3 - 6/13/2024  C4 - 7/3/2024  C5 -     Today's visit  Presents today for evaluation prior to cycle #5 Paclitaxel/Carboplatin. Labs reviewed and appropriate for treatment. Tolerating with anticipated side effects and no dose limiting toxicities. Endorses intermittent neuropathy on the bottom of her feet. Blood glucose elevated on recent metabolic panel. Seen by her PCP and Lantus added.     Referral to radiation oncology for consideration of VCBT. MBP in [place].     Lab Results   Component Value Date/Time     20 07/24/2024 08:38 AM     20 07/01/2024 08:36 AM     22 06/12/2024 08:30 AM      Referral History  Referred by Breana Avilez NP for new diagnosis of high-grade serous endometrial adenocarcinoma. She presented to her PCP 2/2/24 with complaints of post menopausal bleeding and bulging sensation in pelvic area.     2/7/24 EMB-   High-grade serous carcinoma. ER/CO+, HER2 equivocal, p16+, P53+   HER2 fish negative    2/7/24 Pelvic US-   Uterus is retroflexed or retroverted length 10 cm. Endometrium heterogeneous appears partially cystic thickened up to 3 cm with vascular flow. Right ovary unremarkable in the left is not seen.     She has had SVDx2; denies any previous abdominal or pelvic surgeries. She has a family history of breast ad cervical cancer in her mother. Denies any family history of endometrial or ovarian cancer. She is up to date on Pap screening and denies history of abnormal results. She has  "never had colon cancer screening; is scheduled for colonoscopy in November.      Review of Systems   Constitutional:  Negative for appetite change, chills and fever.   Respiratory:  Negative for cough and shortness of breath.    Cardiovascular:  Negative for chest pain.   Gastrointestinal:  Negative for abdominal distention, abdominal pain, constipation, diarrhea and vomiting.   Genitourinary:  Negative for difficulty urinating, pelvic pain, vaginal bleeding and vaginal discharge.    Skin: Negative.    Hematological:  Negative for adenopathy.   Psychiatric/Behavioral: Negative.       OBJECTIVE     /75 (BP Location: Left arm, Patient Position: Sitting, BP Method: Medium (Automatic))   Pulse 85   Temp 97.4 °F (36.3 °C) (Temporal)   Resp 17   Ht 5' 6" (1.676 m)   Wt 81.7 kg (180 lb 1.9 oz)   SpO2 (!) 90%   BMI 29.07 kg/m²     Physical Exam  Constitutional:       Appearance: Normal appearance.   HENT:      Nose: Nose normal.   Cardiovascular:      Rate and Rhythm: Normal rate.   Abdominal:      General: Abdomen is flat.      Palpations: Abdomen is soft.   Musculoskeletal:         General: Normal range of motion.   Neurological:      General: No focal deficit present.      Mental Status: She is alert. Mental status is at baseline.   Skin:     General: Skin is dry.   Psychiatric:         Mood and Affect: Mood normal.         Behavior: Behavior normal.       ASSESSMENT     1. Primary serous adenocarcinoma of endometrium    2. Examination prior to chemotherapy        PLAN     Okay to treat C5 as above    RTC prechemo or sooner if needed.     I spent approximately 45 minutes reviewing the available records and evaluating the patient, out of which over 50% of the time was spent face to face with the patient in counseling and coordinating this patient's care.            "

## 2024-07-25 ENCOUNTER — PATIENT OUTREACH (OUTPATIENT)
Dept: ADMINISTRATIVE | Facility: HOSPITAL | Age: 64
End: 2024-07-25
Payer: MEDICARE

## 2024-07-25 ENCOUNTER — OFFICE VISIT (OUTPATIENT)
Dept: GYNECOLOGIC ONCOLOGY | Facility: CLINIC | Age: 64
End: 2024-07-25
Payer: MEDICARE

## 2024-07-25 ENCOUNTER — INFUSION (OUTPATIENT)
Dept: INFUSION THERAPY | Facility: HOSPITAL | Age: 64
End: 2024-07-25
Attending: OBSTETRICS & GYNECOLOGY
Payer: MEDICARE

## 2024-07-25 ENCOUNTER — TELEPHONE (OUTPATIENT)
Dept: INTERNAL MEDICINE | Facility: CLINIC | Age: 64
End: 2024-07-25
Payer: MEDICARE

## 2024-07-25 VITALS
HEIGHT: 66 IN | OXYGEN SATURATION: 100 % | SYSTOLIC BLOOD PRESSURE: 146 MMHG | HEART RATE: 88 BPM | WEIGHT: 180.13 LBS | TEMPERATURE: 97 F | BODY MASS INDEX: 28.95 KG/M2 | RESPIRATION RATE: 17 BRPM | DIASTOLIC BLOOD PRESSURE: 74 MMHG

## 2024-07-25 VITALS
HEIGHT: 66 IN | RESPIRATION RATE: 17 BRPM | TEMPERATURE: 97 F | HEART RATE: 85 BPM | SYSTOLIC BLOOD PRESSURE: 115 MMHG | OXYGEN SATURATION: 90 % | DIASTOLIC BLOOD PRESSURE: 75 MMHG | BODY MASS INDEX: 28.95 KG/M2 | WEIGHT: 180.13 LBS

## 2024-07-25 DIAGNOSIS — Z01.818 EXAMINATION PRIOR TO CHEMOTHERAPY: ICD-10-CM

## 2024-07-25 DIAGNOSIS — E11.65 UNCONTROLLED TYPE 2 DIABETES MELLITUS WITH HYPERGLYCEMIA: Primary | ICD-10-CM

## 2024-07-25 DIAGNOSIS — C54.1 PRIMARY SEROUS ADENOCARCINOMA OF ENDOMETRIUM: Primary | ICD-10-CM

## 2024-07-25 PROCEDURE — 63600175 PHARM REV CODE 636 W HCPCS: Mod: HCNC,PN | Performed by: OBSTETRICS & GYNECOLOGY

## 2024-07-25 PROCEDURE — 96367 TX/PROPH/DG ADDL SEQ IV INF: CPT | Mod: HCNC,PN

## 2024-07-25 PROCEDURE — 99999 PR PBB SHADOW E&M-EST. PATIENT-LVL IV: CPT | Mod: PBBFAC,HCNC,, | Performed by: OBSTETRICS & GYNECOLOGY

## 2024-07-25 PROCEDURE — 96413 CHEMO IV INFUSION 1 HR: CPT | Mod: HCNC,PN

## 2024-07-25 PROCEDURE — 96375 TX/PRO/DX INJ NEW DRUG ADDON: CPT | Mod: HCNC,PN

## 2024-07-25 PROCEDURE — 96417 CHEMO IV INFUS EACH ADDL SEQ: CPT | Mod: HCNC,PN

## 2024-07-25 PROCEDURE — 96415 CHEMO IV INFUSION ADDL HR: CPT | Mod: HCNC,PN

## 2024-07-25 PROCEDURE — 25000003 PHARM REV CODE 250: Mod: HCNC,PN | Performed by: OBSTETRICS & GYNECOLOGY

## 2024-07-25 RX ORDER — FAMOTIDINE 10 MG/ML
20 INJECTION INTRAVENOUS
Status: CANCELLED | OUTPATIENT
Start: 2024-07-25

## 2024-07-25 RX ORDER — DIPHENHYDRAMINE HYDROCHLORIDE 50 MG/ML
50 INJECTION INTRAMUSCULAR; INTRAVENOUS ONCE AS NEEDED
Status: CANCELLED | OUTPATIENT
Start: 2024-07-25

## 2024-07-25 RX ORDER — FAMOTIDINE 10 MG/ML
20 INJECTION INTRAVENOUS
Status: COMPLETED | OUTPATIENT
Start: 2024-07-25 | End: 2024-07-25

## 2024-07-25 RX ORDER — SODIUM CHLORIDE 0.9 % (FLUSH) 0.9 %
10 SYRINGE (ML) INJECTION
OUTPATIENT
Start: 2024-07-25

## 2024-07-25 RX ORDER — PROCHLORPERAZINE EDISYLATE 5 MG/ML
5 INJECTION INTRAMUSCULAR; INTRAVENOUS ONCE AS NEEDED
OUTPATIENT
Start: 2024-07-25

## 2024-07-25 RX ORDER — EPINEPHRINE 0.3 MG/.3ML
0.3 INJECTION SUBCUTANEOUS ONCE AS NEEDED
Status: DISCONTINUED | OUTPATIENT
Start: 2024-07-25 | End: 2024-07-25 | Stop reason: HOSPADM

## 2024-07-25 RX ORDER — HEPARIN 100 UNIT/ML
500 SYRINGE INTRAVENOUS
OUTPATIENT
Start: 2024-07-25

## 2024-07-25 RX ORDER — DIPHENHYDRAMINE HYDROCHLORIDE 50 MG/ML
50 INJECTION INTRAMUSCULAR; INTRAVENOUS ONCE AS NEEDED
Status: DISCONTINUED | OUTPATIENT
Start: 2024-07-25 | End: 2024-07-25 | Stop reason: HOSPADM

## 2024-07-25 RX ORDER — EPINEPHRINE 0.3 MG/.3ML
0.3 INJECTION SUBCUTANEOUS ONCE AS NEEDED
Status: CANCELLED | OUTPATIENT
Start: 2024-07-25

## 2024-07-25 RX ADMIN — CARBOPLATIN 685 MG: 10 INJECTION, SOLUTION INTRAVENOUS at 02:07

## 2024-07-25 RX ADMIN — SODIUM CHLORIDE: 9 INJECTION, SOLUTION INTRAVENOUS at 10:07

## 2024-07-25 RX ADMIN — DEXAMETHASONE SODIUM PHOSPHATE 0.25 MG: 10 INJECTION, SOLUTION INTRAMUSCULAR; INTRAVENOUS at 10:07

## 2024-07-25 RX ADMIN — PACLITAXEL 342 MG: 6 INJECTION, SOLUTION INTRAVENOUS at 11:07

## 2024-07-25 RX ADMIN — DIPHENHYDRAMINE HYDROCHLORIDE 50 MG: 50 INJECTION, SOLUTION INTRAMUSCULAR; INTRAVENOUS at 10:07

## 2024-07-25 RX ADMIN — FAMOTIDINE 20 MG: 10 INJECTION INTRAVENOUS at 11:07

## 2024-07-25 RX ADMIN — APREPITANT 130 MG: 130 INJECTION, EMULSION INTRAVENOUS at 10:07

## 2024-07-25 NOTE — TELEPHONE ENCOUNTER
Spoke with Pt agreed and verbalized understanding to schedule lab appt at [facility] on 10/25/1/24 @ 8: 20 am ; also appt mail out to home address

## 2024-07-25 NOTE — TELEPHONE ENCOUNTER
----- Message from Dolores Kelly NP sent at 7/25/2024 10:32 AM CDT -----  Schedule A1C in 3 months    negative...

## 2024-07-25 NOTE — TELEPHONE ENCOUNTER
Spoke with pt agreed and verbalized understanding to an appt on 10/25/24 @ 8:20 am ; also mail out to home address

## 2024-07-25 NOTE — PROGRESS NOTES
VBHM Score: 3     Colon Cancer Screening  Osteoporosis Screening  Mammogram    Pneumonia Vaccine  Tetanus Vaccine  Shingles/Zoster Vaccine  RSV Vaccine                  Health Maintenance Topic(s) Outreach Outcomes & Actions Taken:    Colorectal Cancer Screening - Outreach Outcomes & Actions Taken  : Overdue    Osteoporosis Screening - Outreach Outcomes & Actions Taken  : Overdue    Breast Cancer Screening - Outreach Outcomes & Actions Taken  : Overdue       Additional Notes:  Per chart review pt is currently checked in for chemotherapy will call pt at later time               Care Management, Digital Medicine, and/or Education Referrals    OPCM Risk Score: 31.7         Next Steps - Referral Actions: Will call at later time        Additional Notes:

## 2024-07-25 NOTE — TELEPHONE ENCOUNTER
----- Message from Dolores Kelly NP sent at 7/25/2024 10:32 AM CDT -----  Schedule A1C in 3 months

## 2024-07-26 NOTE — TELEPHONE ENCOUNTER
Approval for pt to be seen at the [facility] location for her radiation. Pt scheduled for an initial visit on 8/1/24 with Dr Garcia

## 2024-07-30 ENCOUNTER — TELEPHONE (OUTPATIENT)
Dept: GYNECOLOGIC ONCOLOGY | Facility: CLINIC | Age: 64
End: 2024-07-30
Payer: MEDICARE

## 2024-08-14 ENCOUNTER — TELEPHONE (OUTPATIENT)
Dept: OPHTHALMOLOGY | Facility: CLINIC | Age: 64
End: 2024-08-14
Payer: MEDICARE

## 2024-08-14 ENCOUNTER — LAB VISIT (OUTPATIENT)
Dept: LAB | Facility: HOSPITAL | Age: 64
End: 2024-08-14
Attending: OBSTETRICS & GYNECOLOGY
Payer: MEDICARE

## 2024-08-14 DIAGNOSIS — C54.1 ENDOMETRIAL CANCER: ICD-10-CM

## 2024-08-14 DIAGNOSIS — Z51.11 ENCOUNTER FOR CHEMOTHERAPY MANAGEMENT: ICD-10-CM

## 2024-08-14 LAB
ALBUMIN SERPL BCP-MCNC: 3.6 G/DL (ref 3.5–5.2)
ALP SERPL-CCNC: 65 U/L (ref 55–135)
ALT SERPL W/O P-5'-P-CCNC: 38 U/L (ref 10–44)
ANION GAP SERPL CALC-SCNC: 13 MMOL/L (ref 8–16)
AST SERPL-CCNC: 29 U/L (ref 10–40)
BILIRUB SERPL-MCNC: 0.4 MG/DL (ref 0.1–1)
BUN SERPL-MCNC: 9 MG/DL (ref 8–23)
CALCIUM SERPL-MCNC: 8.7 MG/DL (ref 8.7–10.5)
CANCER AG125 SERPL-ACNC: 21 U/ML (ref 0–30)
CHLORIDE SERPL-SCNC: 104 MMOL/L (ref 95–110)
CO2 SERPL-SCNC: 24 MMOL/L (ref 23–29)
CREAT SERPL-MCNC: 0.8 MG/DL (ref 0.5–1.4)
ERYTHROCYTE [DISTWIDTH] IN BLOOD BY AUTOMATED COUNT: 15.3 % (ref 11.5–14.5)
EST. GFR  (NO RACE VARIABLE): >60 ML/MIN/1.73 M^2
GLUCOSE SERPL-MCNC: 202 MG/DL (ref 70–110)
HCT VFR BLD AUTO: 34.1 % (ref 37–48.5)
HGB BLD-MCNC: 11 G/DL (ref 12–16)
IMM GRANULOCYTES # BLD AUTO: 0.01 K/UL (ref 0–0.04)
MCH RBC QN AUTO: 30.7 PG (ref 27–31)
MCHC RBC AUTO-ENTMCNC: 32.3 G/DL (ref 32–36)
MCV RBC AUTO: 95 FL (ref 82–98)
NEUTROPHILS # BLD AUTO: 2.2 K/UL (ref 1.8–7.7)
PLATELET # BLD AUTO: 252 K/UL (ref 150–450)
PMV BLD AUTO: 10.4 FL (ref 9.2–12.9)
POTASSIUM SERPL-SCNC: 3.6 MMOL/L (ref 3.5–5.1)
PROT SERPL-MCNC: 6.7 G/DL (ref 6–8.4)
RBC # BLD AUTO: 3.58 M/UL (ref 4–5.4)
SODIUM SERPL-SCNC: 141 MMOL/L (ref 136–145)
WBC # BLD AUTO: 3.83 K/UL (ref 3.9–12.7)

## 2024-08-14 PROCEDURE — 85027 COMPLETE CBC AUTOMATED: CPT | Mod: HCNC | Performed by: OBSTETRICS & GYNECOLOGY

## 2024-08-14 PROCEDURE — 86304 IMMUNOASSAY TUMOR CA 125: CPT | Mod: HCNC | Performed by: OBSTETRICS & GYNECOLOGY

## 2024-08-14 PROCEDURE — 36415 COLL VENOUS BLD VENIPUNCTURE: CPT | Mod: HCNC,PN | Performed by: OBSTETRICS & GYNECOLOGY

## 2024-08-14 PROCEDURE — 80053 COMPREHEN METABOLIC PANEL: CPT | Mod: HCNC | Performed by: OBSTETRICS & GYNECOLOGY

## 2024-08-14 RX ORDER — LATANOPROST 50 UG/ML
1 SOLUTION/ DROPS OPHTHALMIC
Qty: 3 ML | Refills: 0 | OUTPATIENT
Start: 2024-08-14

## 2024-08-15 ENCOUNTER — DOCUMENTATION ONLY (OUTPATIENT)
Dept: INFUSION THERAPY | Facility: HOSPITAL | Age: 64
End: 2024-08-15
Payer: MEDICARE

## 2024-08-15 ENCOUNTER — OFFICE VISIT (OUTPATIENT)
Dept: GYNECOLOGIC ONCOLOGY | Facility: CLINIC | Age: 64
End: 2024-08-15
Payer: MEDICARE

## 2024-08-15 ENCOUNTER — INFUSION (OUTPATIENT)
Dept: INFUSION THERAPY | Facility: HOSPITAL | Age: 64
End: 2024-08-15
Attending: OBSTETRICS & GYNECOLOGY
Payer: MEDICARE

## 2024-08-15 VITALS
BODY MASS INDEX: 28.65 KG/M2 | WEIGHT: 182.56 LBS | SYSTOLIC BLOOD PRESSURE: 143 MMHG | TEMPERATURE: 96 F | OXYGEN SATURATION: 96 % | HEIGHT: 67 IN | DIASTOLIC BLOOD PRESSURE: 79 MMHG | RESPIRATION RATE: 17 BRPM | HEART RATE: 93 BPM

## 2024-08-15 VITALS
HEIGHT: 67 IN | DIASTOLIC BLOOD PRESSURE: 70 MMHG | SYSTOLIC BLOOD PRESSURE: 141 MMHG | WEIGHT: 182.56 LBS | BODY MASS INDEX: 28.65 KG/M2 | TEMPERATURE: 96 F | RESPIRATION RATE: 17 BRPM | HEART RATE: 94 BPM | OXYGEN SATURATION: 96 %

## 2024-08-15 DIAGNOSIS — Z01.818 EXAMINATION PRIOR TO CHEMOTHERAPY: ICD-10-CM

## 2024-08-15 DIAGNOSIS — C54.1 PRIMARY SEROUS ADENOCARCINOMA OF ENDOMETRIUM: Primary | ICD-10-CM

## 2024-08-15 PROCEDURE — 96375 TX/PRO/DX INJ NEW DRUG ADDON: CPT | Mod: PN

## 2024-08-15 PROCEDURE — 99999 PR PBB SHADOW E&M-EST. PATIENT-LVL V: CPT | Mod: PBBFAC,,, | Performed by: OBSTETRICS & GYNECOLOGY

## 2024-08-15 PROCEDURE — 96367 TX/PROPH/DG ADDL SEQ IV INF: CPT | Mod: PN

## 2024-08-15 PROCEDURE — A4216 STERILE WATER/SALINE, 10 ML: HCPCS | Mod: HCNC,PN | Performed by: OBSTETRICS & GYNECOLOGY

## 2024-08-15 PROCEDURE — 25000003 PHARM REV CODE 250: Mod: HCNC,PN | Performed by: OBSTETRICS & GYNECOLOGY

## 2024-08-15 PROCEDURE — 63600175 PHARM REV CODE 636 W HCPCS: Mod: HCNC,PN | Performed by: OBSTETRICS & GYNECOLOGY

## 2024-08-15 PROCEDURE — 96415 CHEMO IV INFUSION ADDL HR: CPT | Mod: PN

## 2024-08-15 PROCEDURE — 96417 CHEMO IV INFUS EACH ADDL SEQ: CPT | Mod: PN

## 2024-08-15 PROCEDURE — 96413 CHEMO IV INFUSION 1 HR: CPT | Mod: PN

## 2024-08-15 RX ORDER — DIPHENHYDRAMINE HYDROCHLORIDE 50 MG/ML
50 INJECTION INTRAMUSCULAR; INTRAVENOUS ONCE AS NEEDED
Status: DISCONTINUED | OUTPATIENT
Start: 2024-08-15 | End: 2024-08-15 | Stop reason: HOSPADM

## 2024-08-15 RX ORDER — EPINEPHRINE 0.3 MG/.3ML
0.3 INJECTION SUBCUTANEOUS ONCE AS NEEDED
Status: CANCELLED | OUTPATIENT
Start: 2024-08-15

## 2024-08-15 RX ORDER — HEPARIN 100 UNIT/ML
500 SYRINGE INTRAVENOUS
Status: CANCELLED | OUTPATIENT
Start: 2024-08-15

## 2024-08-15 RX ORDER — SODIUM CHLORIDE 0.9 % (FLUSH) 0.9 %
10 SYRINGE (ML) INJECTION
Status: DISCONTINUED | OUTPATIENT
Start: 2024-08-15 | End: 2024-08-15 | Stop reason: HOSPADM

## 2024-08-15 RX ORDER — PROCHLORPERAZINE EDISYLATE 5 MG/ML
5 INJECTION INTRAMUSCULAR; INTRAVENOUS ONCE AS NEEDED
Status: DISCONTINUED | OUTPATIENT
Start: 2024-08-15 | End: 2024-08-15 | Stop reason: HOSPADM

## 2024-08-15 RX ORDER — DIPHENHYDRAMINE HYDROCHLORIDE 50 MG/ML
50 INJECTION INTRAMUSCULAR; INTRAVENOUS ONCE AS NEEDED
Status: CANCELLED | OUTPATIENT
Start: 2024-08-15

## 2024-08-15 RX ORDER — SODIUM CHLORIDE 0.9 % (FLUSH) 0.9 %
10 SYRINGE (ML) INJECTION
Status: CANCELLED | OUTPATIENT
Start: 2024-08-15

## 2024-08-15 RX ORDER — FAMOTIDINE 10 MG/ML
20 INJECTION INTRAVENOUS
Status: COMPLETED | OUTPATIENT
Start: 2024-08-15 | End: 2024-08-15

## 2024-08-15 RX ORDER — FAMOTIDINE 10 MG/ML
20 INJECTION INTRAVENOUS
Status: CANCELLED | OUTPATIENT
Start: 2024-08-15

## 2024-08-15 RX ORDER — PROCHLORPERAZINE EDISYLATE 5 MG/ML
5 INJECTION INTRAMUSCULAR; INTRAVENOUS ONCE AS NEEDED
Status: CANCELLED | OUTPATIENT
Start: 2024-08-15

## 2024-08-15 RX ADMIN — PACLITAXEL 342 MG: 6 INJECTION, SOLUTION INTRAVENOUS at 12:08

## 2024-08-15 RX ADMIN — SODIUM CHLORIDE: 9 INJECTION, SOLUTION INTRAVENOUS at 10:08

## 2024-08-15 RX ADMIN — CARBOPLATIN 685 MG: 10 INJECTION, SOLUTION INTRAVENOUS at 03:08

## 2024-08-15 RX ADMIN — APREPITANT 130 MG: 130 INJECTION, EMULSION INTRAVENOUS at 11:08

## 2024-08-15 RX ADMIN — FAMOTIDINE 20 MG: 10 INJECTION INTRAVENOUS at 10:08

## 2024-08-15 RX ADMIN — DEXAMETHASONE SODIUM PHOSPHATE 0.25 MG: 10 INJECTION, SOLUTION INTRAMUSCULAR; INTRAVENOUS at 11:08

## 2024-08-15 RX ADMIN — DIPHENHYDRAMINE HYDROCHLORIDE 50 MG: 50 INJECTION, SOLUTION INTRAMUSCULAR; INTRAVENOUS at 11:08

## 2024-08-15 RX ADMIN — Medication 10 ML: at 10:08

## 2024-08-15 NOTE — PLAN OF CARE
Problem: Fatigue  Goal: Improved Activity Tolerance  Intervention: Promote Improved Energy  Flowsheets (Taken 8/15/2024 1030)  Fatigue Management:   activity schedule adjusted   paced activity encouraged   fatigue-related activity identified   frequent rest breaks encouraged  Sleep/Rest Enhancement:   regular sleep/rest pattern promoted   natural light exposure provided   relaxation techniques promoted  Activity Management:   Ambulated -L4   Ambulated to bathroom - L4   Ambulated in strong - L4   Up in stretcher chair - L1  Environmental Support:   calm environment promoted   personal routine supported     Problem: Adult Inpatient Plan of Care  Goal: Patient-Specific Goal (Individualized)  Outcome: Progressing  Flowsheets (Taken 8/15/2024 1030)  Individualized Care Needs: recliner, pillows, warm blankets, dim lights, tv, snacks,  at chairside, conversation  Anxieties, Fears or Concerns: Difficult PIV stick  Patient/Family-Specific Goals (Include Timeframe): no s/s of reaction during treatment     Problem: Adult Inpatient Plan of Care  Goal: Plan of Care Review  Outcome: Progressing  Flowsheets (Taken 8/15/2024 1540)  Plan of Care Reviewed With:   patient   spouse   Pt tolerated her Taxol/Carbo infusions well, NAD. No new c/o voiced. Pt given a schedule and reviewed, pt verbalized understanding. Pt ambulated out of the clinic without difficulty accompanied by her .

## 2024-08-15 NOTE — PROGRESS NOTES
Oncology Nutrition       Weight Check   Chart reviewed. Weight check completed on pt.     CBW:182#  Weight at initiation of tx:178#    Wt Readings from Last 10 Encounters:   08/15/24 82.8 kg (182 lb 8.7 oz)   08/15/24 82.8 kg (182 lb 8.7 oz)   07/25/24 81.7 kg (180 lb 1.9 oz)   07/25/24 81.7 kg (180 lb 1.9 oz)   07/15/24 78.9 kg (174 lb 0.9 oz)   07/03/24 81.8 kg (180 lb 5.4 oz)   07/03/24 81.8 kg (180 lb 5.4 oz)   06/13/24 83 kg (182 lb 15.7 oz)   06/13/24 83 kg (182 lb 15.7 oz)   05/20/24 83.9 kg (184 lb 15.5 oz)        RD plan of care: Weight is currently 182#. No significant change at this time. Per nursing nutrition risk report, pt denies any nutritional concerns or challenges at this time. RD to continue to monitor; no nutritional interventions are needed at this time.     Blessing Garcia, MS, RD, LDN  08/15/2024  10:41 AM

## 2024-08-15 NOTE — PROGRESS NOTES
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  Washings negative  ER/CO+  HER2 fish negative  P53+  MMRp     48 pretreatment    Discussed adjuvant chemotherapy with carbo/taxol    C1 - 4/25/2024  C2 - 5/20/2024  C3 - 6/13/2024  C4 - 7/3/2024  C5 - 7/25/2024  C6-      Today's visit  Presents today for evaluation prior to cycle #6 Paclitaxel/Carboplatin. Labs reviewed and appropriate for treatment. Tolerating with anticipated side effects and no dose limiting toxicities. Endorses intermittent neuropathy on the bottom of her feet. Blood glucose elevated on recent metabolic panel. Seen by her PCP and Lantus added. Hbg A1C 11.1. PCP managing.     Referral to radiation oncology for consideration of adjuvant radiation. Has appointment 8/22/24 at [facility].     Lab Results   Component Value Date/Time     21 08/14/2024 08:35 AM     20 07/24/2024 08:38 AM     20 07/01/2024 08:36 AM      Referral History  Referred by Breana Avilez NP for new diagnosis of high-grade serous endometrial adenocarcinoma. She presented to her PCP 2/2/24 with complaints of post menopausal bleeding and bulging sensation in pelvic area.     2/7/24 EMB-   High-grade serous carcinoma. ER/CO+, HER2 equivocal, p16+, P53+   HER2 fish negative    2/7/24 Pelvic US-   Uterus is retroflexed or retroverted length 10 cm. Endometrium heterogeneous appears partially cystic thickened up to 3 cm with vascular flow. Right ovary unremarkable in the left is not seen.     She has had SVDx2; denies any previous abdominal or pelvic surgeries. She has a family history of breast ad cervical cancer in her mother. Denies any  "family history of endometrial or ovarian cancer. She is up to date on Pap screening and denies history of abnormal results. She has never had colon cancer screening; is scheduled for colonoscopy in November.      Review of Systems   Constitutional:  Negative for appetite change, chills and fever.   Respiratory:  Negative for cough and shortness of breath.    Cardiovascular:  Negative for chest pain.   Gastrointestinal:  Negative for abdominal distention, abdominal pain, constipation, diarrhea and vomiting.   Genitourinary:  Negative for difficulty urinating, pelvic pain, vaginal bleeding and vaginal discharge.    Skin: Negative.    Hematological:  Negative for adenopathy.   Psychiatric/Behavioral: Negative.       OBJECTIVE     BP (!) 141/70 (BP Location: Left arm, Patient Position: Sitting, BP Method: Medium (Automatic))   Pulse 94   Temp 96.3 °F (35.7 °C) (Temporal)   Resp 17   Ht 5' 7" (1.702 m)   Wt 82.8 kg (182 lb 8.7 oz)   SpO2 96%   BMI 28.59 kg/m²     Physical Exam  Constitutional:       Appearance: Normal appearance.   HENT:      Nose: Nose normal.   Cardiovascular:      Rate and Rhythm: Normal rate.   Abdominal:      General: Abdomen is flat.      Palpations: Abdomen is soft.   Musculoskeletal:         General: Normal range of motion.   Neurological:      General: No focal deficit present.      Mental Status: She is alert. Mental status is at baseline.   Skin:     General: Skin is dry.   Psychiatric:         Mood and Affect: Mood normal.         Behavior: Behavior normal.       ASSESSMENT     1. Primary serous adenocarcinoma of endometrium  - CT Chest Abdomen Pelvis With IV Contrast (XPD) Routine; Future    2. Examination prior to chemotherapy          PLAN     Okay to treat C6 as above.   Plan for interval imaging assessment following this cycle  Will be seeing rad onc next week.     RTC 4 weeks.     I spent approximately 45 minutes reviewing the available records and evaluating the patient, out of " which over 50% of the time was spent face to face with the patient in counseling and coordinating this patient's care.

## 2024-09-12 DIAGNOSIS — I10 ESSENTIAL HYPERTENSION: ICD-10-CM

## 2024-09-12 RX ORDER — AMLODIPINE BESYLATE 5 MG/1
5 TABLET ORAL DAILY
Qty: 90 TABLET | Refills: 3 | Status: SHIPPED | OUTPATIENT
Start: 2024-09-12

## 2024-10-16 ENCOUNTER — HOSPITAL ENCOUNTER (OUTPATIENT)
Dept: RADIOLOGY | Facility: HOSPITAL | Age: 64
Discharge: HOME | End: 2024-10-16
Attending: OBSTETRICS & GYNECOLOGY
Payer: MEDICARE

## 2024-10-16 DIAGNOSIS — C54.1 PRIMARY SEROUS ADENOCARCINOMA OF ENDOMETRIUM: ICD-10-CM

## 2024-10-16 PROCEDURE — 71260 CT THORAX DX C+: CPT | Mod: 26,HCNC,, | Performed by: RADIOLOGY

## 2024-10-16 PROCEDURE — 71260 CT THORAX DX C+: CPT | Mod: TC,HCNC

## 2024-10-16 PROCEDURE — 74177 CT ABD & PELVIS W/CONTRAST: CPT | Mod: TC,HCNC

## 2024-10-16 PROCEDURE — 74177 CT ABD & PELVIS W/CONTRAST: CPT | Mod: 26,HCNC,, | Performed by: RADIOLOGY

## 2024-10-16 PROCEDURE — 25500020 PHARM REV CODE 255: Mod: HCNC | Performed by: OBSTETRICS & GYNECOLOGY

## 2024-10-16 PROCEDURE — A9698 NON-RAD CONTRAST MATERIALNOC: HCPCS | Mod: HCNC | Performed by: OBSTETRICS & GYNECOLOGY

## 2024-10-16 RX ADMIN — IOHEXOL 100 ML: 350 INJECTION, SOLUTION INTRAVENOUS at 03:10

## 2024-10-16 RX ADMIN — IOHEXOL 1000 ML: 12 SOLUTION ORAL at 03:10

## 2024-10-17 ENCOUNTER — OFFICE VISIT (OUTPATIENT)
Dept: GYNECOLOGIC ONCOLOGY | Facility: CLINIC | Age: 64
End: 2024-10-17
Payer: MEDICARE

## 2024-10-17 VITALS
OXYGEN SATURATION: 98 % | HEIGHT: 66 IN | WEIGHT: 182.31 LBS | TEMPERATURE: 96 F | BODY MASS INDEX: 29.3 KG/M2 | SYSTOLIC BLOOD PRESSURE: 126 MMHG | RESPIRATION RATE: 16 BRPM | HEART RATE: 75 BPM | DIASTOLIC BLOOD PRESSURE: 70 MMHG

## 2024-10-17 DIAGNOSIS — C54.1 PRIMARY SEROUS ADENOCARCINOMA OF ENDOMETRIUM: Primary | ICD-10-CM

## 2024-10-17 PROCEDURE — 3066F NEPHROPATHY DOC TX: CPT | Mod: HCNC,CPTII,S$GLB, | Performed by: OBSTETRICS & GYNECOLOGY

## 2024-10-17 PROCEDURE — 3288F FALL RISK ASSESSMENT DOCD: CPT | Mod: HCNC,CPTII,S$GLB, | Performed by: OBSTETRICS & GYNECOLOGY

## 2024-10-17 PROCEDURE — 1126F AMNT PAIN NOTED NONE PRSNT: CPT | Mod: HCNC,CPTII,S$GLB, | Performed by: OBSTETRICS & GYNECOLOGY

## 2024-10-17 PROCEDURE — 1159F MED LIST DOCD IN RCRD: CPT | Mod: HCNC,CPTII,S$GLB, | Performed by: OBSTETRICS & GYNECOLOGY

## 2024-10-17 PROCEDURE — 3008F BODY MASS INDEX DOCD: CPT | Mod: HCNC,CPTII,S$GLB, | Performed by: OBSTETRICS & GYNECOLOGY

## 2024-10-17 PROCEDURE — 3078F DIAST BP <80 MM HG: CPT | Mod: HCNC,CPTII,S$GLB, | Performed by: OBSTETRICS & GYNECOLOGY

## 2024-10-17 PROCEDURE — 3074F SYST BP LT 130 MM HG: CPT | Mod: HCNC,CPTII,S$GLB, | Performed by: OBSTETRICS & GYNECOLOGY

## 2024-10-17 PROCEDURE — 3046F HEMOGLOBIN A1C LEVEL >9.0%: CPT | Mod: HCNC,CPTII,S$GLB, | Performed by: OBSTETRICS & GYNECOLOGY

## 2024-10-17 PROCEDURE — 99999 PR PBB SHADOW E&M-EST. PATIENT-LVL IV: CPT | Mod: PBBFAC,HCNC,, | Performed by: OBSTETRICS & GYNECOLOGY

## 2024-10-17 PROCEDURE — 3061F NEG MICROALBUMINURIA REV: CPT | Mod: HCNC,CPTII,S$GLB, | Performed by: OBSTETRICS & GYNECOLOGY

## 2024-10-17 PROCEDURE — 1101F PT FALLS ASSESS-DOCD LE1/YR: CPT | Mod: HCNC,CPTII,S$GLB, | Performed by: OBSTETRICS & GYNECOLOGY

## 2024-10-17 PROCEDURE — 99214 OFFICE O/P EST MOD 30 MIN: CPT | Mod: HCNC,S$GLB,, | Performed by: OBSTETRICS & GYNECOLOGY

## 2024-10-17 NOTE — PROGRESS NOTES
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  Washings negative  ER/SC+  HER2 fish negative  P53+  MMRp     48 pretreatment    Adjuvant chemotherapy with Carboplatin/ Paclitaxel   C1 - 4/25/2024  C2 - 5/20/2024  C3 - 6/13/2024  C4 - 7/3/2024  C5 - 7/25/2024  C6-  8/15/2024    Today's visit  Presents today for post-treatment follow-up. Completed 6 cycles of adjuvant Carbo/ Taxol on 8/15/24.     Initiated adjuvant radiation 9/26/2024 at [facility]. Completed 5 fractions vaginal HDR.     CT CAP 10/16/2024 No evidence of disease     Lab Results   Component Value Date/Time     21 08/14/2024 08:35 AM     20 07/24/2024 08:38 AM     20 07/01/2024 08:36 AM      ________________________________________________________________________________  Referral History  Referred by Breana Avilez NP for new diagnosis of high-grade serous endometrial adenocarcinoma. She presented to her PCP 2/2/24 with complaints of post menopausal bleeding and bulging sensation in pelvic area.     2/7/24 EMB-   High-grade serous carcinoma. ER/SC+, HER2 equivocal, p16+, P53+   HER2 fish negative    2/7/24 Pelvic US-   Uterus is retroflexed or retroverted length 10 cm. Endometrium heterogeneous appears partially cystic thickened up to 3 cm with vascular flow. Right ovary unremarkable in the left is not seen.     She has had SVDx2; denies any previous abdominal or pelvic surgeries. She has a family history of breast ad cervical cancer in her mother. Denies any family history of endometrial or ovarian cancer. She is up to date on Pap screening and denies history of abnormal results. She has never had colon  "cancer screening; is scheduled for colonoscopy in November.      Review of Systems   Constitutional:  Negative for appetite change, chills and fever.   Respiratory:  Negative for cough and shortness of breath.    Cardiovascular:  Negative for chest pain.   Gastrointestinal:  Negative for abdominal distention, abdominal pain, constipation, diarrhea and vomiting.   Genitourinary:  Negative for difficulty urinating, pelvic pain, vaginal bleeding and vaginal discharge.    Skin: Negative.    Hematological:  Negative for adenopathy.   Psychiatric/Behavioral: Negative.       OBJECTIVE     /70 (BP Location: Right arm, Patient Position: Sitting)   Pulse 75   Temp 96.2 °F (35.7 °C) (Temporal)   Resp 16   Ht 5' 6" (1.676 m)   Wt 82.7 kg (182 lb 5.1 oz)   SpO2 98%   BMI 29.43 kg/m²     Physical Exam  Constitutional:       Appearance: Normal appearance.   HENT:      Nose: Nose normal.   Cardiovascular:      Rate and Rhythm: Normal rate.   Abdominal:      General: Abdomen is flat.      Palpations: Abdomen is soft.   Musculoskeletal:         General: Normal range of motion.   Neurological:      General: No focal deficit present.      Mental Status: She is alert. Mental status is at baseline.   Skin:     General: Skin is dry.   Psychiatric:         Mood and Affect: Mood normal.         Behavior: Behavior normal.       ASSESSMENT     1. Primary serous adenocarcinoma of endometrium  - ; Future            PLAN     Completed adjuvant therapy.  Imaging shows no evidence of disease.   Discussed surveillance and survivorship. Signs and symptoms of recurrence.     RTC 3 months with  or sooner if needed.      I spent approximately 30 minutes reviewing the available records and evaluating the patient, out of which over 50% of the time was spent face to face with the patient in counseling and coordinating this patient's care.                  "

## 2024-10-28 DIAGNOSIS — L30.9 DERMATITIS: ICD-10-CM

## 2024-10-28 RX ORDER — BETAMETHASONE DIPROPIONATE 0.5 MG/G
OINTMENT TOPICAL 2 TIMES DAILY
Qty: 15 G | Refills: 5 | Status: SHIPPED | OUTPATIENT
Start: 2024-10-28

## 2024-11-07 ENCOUNTER — LAB VISIT (OUTPATIENT)
Dept: LAB | Facility: HOSPITAL | Age: 64
End: 2024-11-07
Attending: NURSE PRACTITIONER
Payer: MEDICARE

## 2024-11-07 ENCOUNTER — OFFICE VISIT (OUTPATIENT)
Dept: INTERNAL MEDICINE | Facility: CLINIC | Age: 64
End: 2024-11-07
Payer: MEDICARE

## 2024-11-07 VITALS
DIASTOLIC BLOOD PRESSURE: 70 MMHG | OXYGEN SATURATION: 98 % | TEMPERATURE: 96 F | WEIGHT: 181.13 LBS | SYSTOLIC BLOOD PRESSURE: 132 MMHG | HEART RATE: 93 BPM | BODY MASS INDEX: 29.11 KG/M2 | HEIGHT: 66 IN

## 2024-11-07 DIAGNOSIS — E11.65 TYPE 2 DIABETES MELLITUS WITH HYPERGLYCEMIA, WITHOUT LONG-TERM CURRENT USE OF INSULIN: ICD-10-CM

## 2024-11-07 DIAGNOSIS — Z12.31 BREAST CANCER SCREENING BY MAMMOGRAM: ICD-10-CM

## 2024-11-07 DIAGNOSIS — H40.1130 PRIMARY OPEN ANGLE GLAUCOMA OF BOTH EYES, UNSPECIFIED GLAUCOMA STAGE: ICD-10-CM

## 2024-11-07 DIAGNOSIS — I10 PRIMARY HYPERTENSION: Primary | ICD-10-CM

## 2024-11-07 LAB
ESTIMATED AVG GLUCOSE: 146 MG/DL (ref 68–131)
HBA1C MFR BLD: 6.7 % (ref 4–5.6)

## 2024-11-07 PROCEDURE — 99999 PR PBB SHADOW E&M-EST. PATIENT-LVL V: CPT | Mod: PBBFAC,,, | Performed by: NURSE PRACTITIONER

## 2024-11-07 PROCEDURE — 1101F PT FALLS ASSESS-DOCD LE1/YR: CPT | Mod: CPTII,S$GLB,, | Performed by: NURSE PRACTITIONER

## 2024-11-07 PROCEDURE — G2211 COMPLEX E/M VISIT ADD ON: HCPCS | Mod: S$GLB,,, | Performed by: NURSE PRACTITIONER

## 2024-11-07 PROCEDURE — 36415 COLL VENOUS BLD VENIPUNCTURE: CPT | Mod: PN | Performed by: NURSE PRACTITIONER

## 2024-11-07 PROCEDURE — 99214 OFFICE O/P EST MOD 30 MIN: CPT | Mod: S$GLB,,, | Performed by: NURSE PRACTITIONER

## 2024-11-07 PROCEDURE — 1160F RVW MEDS BY RX/DR IN RCRD: CPT | Mod: CPTII,S$GLB,, | Performed by: NURSE PRACTITIONER

## 2024-11-07 PROCEDURE — 1126F AMNT PAIN NOTED NONE PRSNT: CPT | Mod: CPTII,S$GLB,, | Performed by: NURSE PRACTITIONER

## 2024-11-07 PROCEDURE — 3008F BODY MASS INDEX DOCD: CPT | Mod: CPTII,S$GLB,, | Performed by: NURSE PRACTITIONER

## 2024-11-07 PROCEDURE — 3066F NEPHROPATHY DOC TX: CPT | Mod: CPTII,S$GLB,, | Performed by: NURSE PRACTITIONER

## 2024-11-07 PROCEDURE — 3044F HG A1C LEVEL LT 7.0%: CPT | Mod: CPTII,S$GLB,, | Performed by: NURSE PRACTITIONER

## 2024-11-07 PROCEDURE — 3078F DIAST BP <80 MM HG: CPT | Mod: CPTII,S$GLB,, | Performed by: NURSE PRACTITIONER

## 2024-11-07 PROCEDURE — 3288F FALL RISK ASSESSMENT DOCD: CPT | Mod: CPTII,S$GLB,, | Performed by: NURSE PRACTITIONER

## 2024-11-07 PROCEDURE — 83036 HEMOGLOBIN GLYCOSYLATED A1C: CPT | Performed by: NURSE PRACTITIONER

## 2024-11-07 PROCEDURE — 3061F NEG MICROALBUMINURIA REV: CPT | Mod: CPTII,S$GLB,, | Performed by: NURSE PRACTITIONER

## 2024-11-07 PROCEDURE — 3075F SYST BP GE 130 - 139MM HG: CPT | Mod: CPTII,S$GLB,, | Performed by: NURSE PRACTITIONER

## 2024-11-07 PROCEDURE — 1159F MED LIST DOCD IN RCRD: CPT | Mod: CPTII,S$GLB,, | Performed by: NURSE PRACTITIONER

## 2024-11-07 RX ORDER — TIMOLOL MALEATE 5 MG/ML
1 SOLUTION/ DROPS OPHTHALMIC EVERY MORNING
Qty: 5 ML | Refills: 1 | Status: SHIPPED | OUTPATIENT
Start: 2024-11-07 | End: 2025-11-07

## 2024-11-07 RX ORDER — LATANOPROST 50 UG/ML
1 SOLUTION/ DROPS OPHTHALMIC NIGHTLY
Qty: 2.5 ML | Refills: 1 | Status: SHIPPED | OUTPATIENT
Start: 2024-11-07

## 2024-11-13 NOTE — PROGRESS NOTES
Patient ID: Pricilla Bower is a 64 y.o. female.    Chief Complaint: Eye Problem and Blurred Vision    History of Present Illness    CHIEF COMPLAINT:  Ms. Bower presents today for concerns of right eye blurred vision.    HPI:  Ms. Bower reports a history of abnormal vision in her right eye for an unspecified duration. 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 Ms. Bower has not adhered to her eye drop regimen for the past couple of months. She notes increased blurring of vision in the right eye, while vision in the left eye remains relatively clear. A follow-up visit with Ophthalmology is scheduled for December 31, 2024.    Ms. Bower recently completed treatment for adenocarcinoma of the endometrium. During chemotherapy, she received steroids which led to an increase in her A1c to 11.1% in July. She was subsequently started on Lantus 15 units at bedtime. Ms. Bower reports normalization of her home glucose readings since initiating this medication.    Ms. Bower denies any redness, drainage, or discomfort in the eye. She also denies any pain with ocular movement.    MEDICATIONS:  Ms. Bower is also using Timolol 0.5% eye drops in both eyes every evening and Latanoprost 0.005% eye drops in both eyes at night, both for glaucoma management.    MEDICAL HISTORY:  Ms. Bower has a history of primary glaucoma in both eyes, Type 2 diabetes, and adenocarcinoma of the endometrium for which she has completed treatment.     TEST RESULTS:  Her HbA1c in July 2023 was 11.1%, which increased during chemotherapy treatment with steroids.  Will repeat lab today      ROS:  Constitutional: negative diminished activity, negative appetite change, negative fatigue, negative fever  HENT: negative ear discharge, negative ear pain, negative mouth sores, negative nosebleeds, negative sore throat, negative tinnitus  Eyes: negative discharge, negative eye redness,  negative eye itchiness, POSITIVE VISION CHANGES, POSITIVE BLURRY VISION, negative eye pain  Respiratory: negative apnea, negative cough, negative shortness of breath  Cardiovascular: negative chest pain, negative leg swelling  Gastrointestinal: negative abdominal distention, negative abdominal pain, negative blood in stool, negative constipation, negative diarrhea, negative nausea, negative vomiting  Musculoskeletal: negative back pain, negative abnormal gait, negative neck pain, negative neck stiffness, negative joint pain, negative muscle pain  Skin: negative rash, negative wound, negative abnormal moles  Psychiatric: negative agitation, negative confusion, negative hallucinations, negative sleep difficulty, negative suicidal ideation         Physical Exam    Vital Signs: Reviewed.  Constitutional: Well-appearing. Well-developed. No acute distress.  Eyes: Pupils are equal, round, and reactive to light. Normal ocular movement. Eyes reactive.  Cardiovascular: Normal rate and regular rhythm. Normal heart sounds.  Pulmonary: Pulmonary effort is normal. Normal breath sounds.  Abdominal: Bowel sounds are normal. Abdomen is soft. No tenderness.  Musculoskeletal: No muscle tenderness. No joint tenderness. Normal range of motion.  Skin: Warm. Dry.  Psychiatric: Mood is normal. Behavior is normal. Behavior is cooperative.         Assessment & Plan    H40.1122 Primary open-angle glaucoma, left eye, moderate stage  E11.65 Type 2 diabetes mellitus with hyperglycemia  C54.1 Malignant neoplasm of endometrium  H53.8 Other visual disturbances    GLAUCOMA:  - Assessed right eye blurred vision in patient with history of primary glaucoma in both eyes.  - Evaluated eye exam findings as normal with no signs of redness, drainage, or discomfort.  - Discussed importance of consistent use of glaucoma eye drops for managing intraocular pressure.  - Ms. Bower to resume regular use of glaucoma eye drops as prescribed.  - Refilled timolol  0.5% eye drops: Apply to both eyes every evening.  - Refilled latanoprost 0.005% eye drops: Apply to both eyes at night.  - Follow up with Ophthalmology on December 31, 2024, as scheduled.    DIABETES:  - Considered recent history of steroid-induced A1c elevation.  - Assessed current glucose control with reported normal home readings after starting Lantus.  - Ordered A1c test to evaluate glycemic control.    ENDOMETRIAL ADENOCARCINOMA:  - Considered recent history of endometrial adenocarcinoma treatment.    BREAST CANCER SCREENING:  - Ordered mammogram for breast cancer screening.           Follow up in about 6 months (around 5/7/2025).    This note was generated with the assistance of ambient listening technology. Verbal consent was obtained by the patient and accompanying visitor(s) for the recording of patient appointment to facilitate this note. I attest to having reviewed and edited the generated note for accuracy, though some syntax or spelling errors may persist. Please contact the author of this note for any clarification.

## 2024-11-20 ENCOUNTER — HOSPITAL ENCOUNTER (OUTPATIENT)
Dept: RADIOLOGY | Facility: HOSPITAL | Age: 64
Discharge: HOME | End: 2024-11-20
Attending: NURSE PRACTITIONER
Payer: MEDICARE

## 2024-11-20 VITALS — BODY MASS INDEX: 29.12 KG/M2 | HEIGHT: 66 IN | WEIGHT: 181.19 LBS

## 2024-11-20 DIAGNOSIS — Z12.31 BREAST CANCER SCREENING BY MAMMOGRAM: ICD-10-CM

## 2024-11-20 PROCEDURE — 77067 SCR MAMMO BI INCL CAD: CPT | Mod: TC

## 2024-11-20 PROCEDURE — 77067 SCR MAMMO BI INCL CAD: CPT | Mod: 26,,, | Performed by: RADIOLOGY

## 2024-11-20 PROCEDURE — 77063 BREAST TOMOSYNTHESIS BI: CPT | Mod: 26,,, | Performed by: RADIOLOGY

## 2024-12-18 ENCOUNTER — TELEPHONE (OUTPATIENT)
Dept: INTERNAL MEDICINE | Facility: CLINIC | Age: 64
End: 2024-12-18
Payer: MEDICARE

## 2024-12-18 NOTE — TELEPHONE ENCOUNTER
----- Message from Teodora sent at 12/18/2024 11:16 AM CST -----  Contact: Pricilla  .Type:  Sooner Apoointment Request    Caller is requesting a sooner appointment.  Caller declined first available appointment listed below.  Caller will not accept being placed on the waitlist and is requesting a message be sent to doctor.  Name of Caller:Pricilla  When is the first available appointment?12/23/24  Symptoms:foot problems  Would the patient rather a call back or a response via [employer]? Call back  Best Call Back Number:.[phone] (home)    Additional Information:

## 2024-12-23 DIAGNOSIS — H40.1130 PRIMARY OPEN ANGLE GLAUCOMA OF BOTH EYES, UNSPECIFIED GLAUCOMA STAGE: ICD-10-CM

## 2024-12-23 DIAGNOSIS — Z79.4 TYPE 2 DIABETES MELLITUS WITH HYPEROSMOLARITY WITHOUT COMA, WITH LONG-TERM CURRENT USE OF INSULIN: ICD-10-CM

## 2024-12-23 DIAGNOSIS — E11.00 TYPE 2 DIABETES MELLITUS WITH HYPEROSMOLARITY WITHOUT COMA, WITH LONG-TERM CURRENT USE OF INSULIN: ICD-10-CM

## 2024-12-23 RX ORDER — LANCETS
EACH MISCELLANEOUS
Qty: 100 EACH | Refills: 3 | Status: SHIPPED | OUTPATIENT
Start: 2024-12-23

## 2024-12-23 RX ORDER — TIMOLOL MALEATE 5 MG/ML
1 SOLUTION/ DROPS OPHTHALMIC EVERY MORNING
Qty: 5 ML | Refills: 0 | Status: SHIPPED | OUTPATIENT
Start: 2024-12-23 | End: 2025-12-23

## 2024-12-23 NOTE — TELEPHONE ENCOUNTER
Received refill request from patient's pharmacy     Requested Prescriptions     Pending Prescriptions Disp Refills    timolol maleate 0.5% (TIMOPTIC) 0.5 % Drop 5 mL 1     Sig: Place 1 drop into both eyes every morning.    blood sugar diagnostic Strp 100 each 3     Sig: To check BG 2 times daily, to use with insurance preferred meter    lancets Misc 100 each 3     Sig: To check BG 2 times daily, to use with insurance preferred meter         Last visit- 11/07/2024  Last filled- 11/07/2024  Next visit- 12/23/24 (missed appt this am)    Please review and advise.

## 2024-12-31 ENCOUNTER — OFFICE VISIT (OUTPATIENT)
Dept: OPHTHALMOLOGY | Facility: CLINIC | Age: 64
End: 2024-12-31
Payer: MEDICARE

## 2024-12-31 DIAGNOSIS — H25.043 POSTERIOR SUBCAPSULAR AGE-RELATED CATARACT OF BOTH EYES: ICD-10-CM

## 2024-12-31 DIAGNOSIS — H25.013 CORTICAL AGE-RELATED CATARACT OF BOTH EYES: ICD-10-CM

## 2024-12-31 DIAGNOSIS — H52.4 ASTIGMATISM WITH PRESBYOPIA, BILATERAL: ICD-10-CM

## 2024-12-31 DIAGNOSIS — E11.9 TYPE 2 DIABETES MELLITUS WITHOUT RETINOPATHY: Primary | ICD-10-CM

## 2024-12-31 DIAGNOSIS — H52.203 ASTIGMATISM WITH PRESBYOPIA, BILATERAL: ICD-10-CM

## 2024-12-31 DIAGNOSIS — H40.1111 PRIMARY OPEN ANGLE GLAUCOMA (POAG) OF RIGHT EYE, MILD STAGE: ICD-10-CM

## 2024-12-31 DIAGNOSIS — H40.1130 PRIMARY OPEN ANGLE GLAUCOMA OF BOTH EYES, UNSPECIFIED GLAUCOMA STAGE: ICD-10-CM

## 2024-12-31 DIAGNOSIS — H25.13 NUCLEAR SCLEROSIS, BILATERAL: ICD-10-CM

## 2024-12-31 DIAGNOSIS — H40.1122 PRIMARY OPEN ANGLE GLAUCOMA (POAG) OF LEFT EYE, MODERATE STAGE: ICD-10-CM

## 2024-12-31 DIAGNOSIS — E11.36 DIABETIC CATARACT OF BOTH EYES: ICD-10-CM

## 2024-12-31 PROCEDURE — 99999 PR PBB SHADOW E&M-EST. PATIENT-LVL III: CPT | Mod: PBBFAC,HCNC,, | Performed by: OPTOMETRIST

## 2024-12-31 PROCEDURE — 92133 CPTRZD OPH DX IMG PST SGM ON: CPT | Mod: HCNC,S$GLB,, | Performed by: OPTOMETRIST

## 2024-12-31 PROCEDURE — 1159F MED LIST DOCD IN RCRD: CPT | Mod: HCNC,CPTII,S$GLB, | Performed by: OPTOMETRIST

## 2024-12-31 PROCEDURE — 3044F HG A1C LEVEL LT 7.0%: CPT | Mod: HCNC,CPTII,S$GLB, | Performed by: OPTOMETRIST

## 2024-12-31 PROCEDURE — 92015 DETERMINE REFRACTIVE STATE: CPT | Mod: HCNC,S$GLB,, | Performed by: OPTOMETRIST

## 2024-12-31 PROCEDURE — 3061F NEG MICROALBUMINURIA REV: CPT | Mod: HCNC,CPTII,S$GLB, | Performed by: OPTOMETRIST

## 2024-12-31 PROCEDURE — G2211 COMPLEX E/M VISIT ADD ON: HCPCS | Mod: HCNC,S$GLB,, | Performed by: OPTOMETRIST

## 2024-12-31 PROCEDURE — 99214 OFFICE O/P EST MOD 30 MIN: CPT | Mod: HCNC,S$GLB,, | Performed by: OPTOMETRIST

## 2024-12-31 PROCEDURE — 2023F DILAT RTA XM W/O RTNOPTHY: CPT | Mod: HCNC,CPTII,S$GLB, | Performed by: OPTOMETRIST

## 2024-12-31 PROCEDURE — 3066F NEPHROPATHY DOC TX: CPT | Mod: HCNC,CPTII,S$GLB, | Performed by: OPTOMETRIST

## 2024-12-31 PROCEDURE — 1160F RVW MEDS BY RX/DR IN RCRD: CPT | Mod: HCNC,CPTII,S$GLB, | Performed by: OPTOMETRIST

## 2024-12-31 RX ORDER — LATANOPROST 50 UG/ML
1 SOLUTION/ DROPS OPHTHALMIC NIGHTLY
Qty: 2.5 ML | Refills: 11 | Status: SHIPPED | OUTPATIENT
Start: 2024-12-31

## 2024-12-31 RX ORDER — TIMOLOL MALEATE 5 MG/ML
1 SOLUTION/ DROPS OPHTHALMIC EVERY MORNING
Qty: 5 ML | Refills: 11 | Status: SHIPPED | OUTPATIENT
Start: 2024-12-31 | End: 2025-12-31

## 2024-12-31 NOTE — PROGRESS NOTES
HPI     Annual Exam            Comments:  Lost to follow up 3 months for IOP check   DM 2013  POAG  Latanoprost qhs OU (started 8/02/23)  Timolol 0.5% qam OU (started 10/17/23, IOP 18 at that visit)  Target IOP 17    Lab Results       Component                Value               Date                       HGBA1C                   6.7 (H)             11/07/2024            Vision changes since last eye exam?: yes, OD is blurry  Any eye pain today: no  Other ocular symptoms: no  Interested in contact lens fitting today? no                             Last edited by Ninfa Roberts on 12/31/2024  9:10 AM.            Assessment /Plan     For exam results, see Encounter Report.    Type 2 diabetes mellitus without retinopathy  There was no diabetic retinopathy present in either eye today.   Recommended that pt continue care with PCP and/or specialists regarding diabetes.  Follow-up dilated eye exam recommended in 12 months, sooner with any vision changes or new concerns.    Primary open angle glaucoma (POAG) of right eye, mild stage  Primary open angle glaucoma (POAG) of left eye, moderate stage  -     Posterior Segment OCT Optic Nerve- Both eyes  IOP stable today and within acceptable range relative to target OU  Poor quality gOCT today OD 2/2 PSC   Slight progression on NFL scan OS with stable GCL OS  Continue current treatment  Continue Latanoprost qhs OU and Timolol qam OU    Nuclear sclerosis, bilateral  Cortical age-related cataract of both eyes  Posterior subcapsular age-related cataract of both eyes  Diabetic cataract of both eyes  -     Ambulatory referral/consult to Ophthalmology; Future; Expected date: 01/07/2025  Cataract accounts for vision change. New Rx for glasses/contacts will not improve vision.   Refer to ophthalmologist for cataract evaluation.     Astigmatism with presbyopia, bilateral  Hold spec rx      RTC next available with Dr. Rao for cataract evaluation or PRN if any problems.   Discussed above  and answered questions.

## 2025-01-14 DIAGNOSIS — Z00.00 ENCOUNTER FOR MEDICARE ANNUAL WELLNESS EXAM: ICD-10-CM

## 2025-02-05 ENCOUNTER — LAB VISIT (OUTPATIENT)
Dept: LAB | Facility: HOSPITAL | Age: 65
End: 2025-02-05
Attending: OBSTETRICS & GYNECOLOGY
Payer: MEDICARE

## 2025-02-05 DIAGNOSIS — C54.1 PRIMARY SEROUS ADENOCARCINOMA OF ENDOMETRIUM: ICD-10-CM

## 2025-02-05 PROCEDURE — 86304 IMMUNOASSAY TUMOR CA 125: CPT | Mod: HCNC | Performed by: OBSTETRICS & GYNECOLOGY

## 2025-02-05 PROCEDURE — 36415 COLL VENOUS BLD VENIPUNCTURE: CPT | Mod: HCNC,PN | Performed by: OBSTETRICS & GYNECOLOGY

## 2025-02-06 ENCOUNTER — OFFICE VISIT (OUTPATIENT)
Dept: GYNECOLOGIC ONCOLOGY | Facility: CLINIC | Age: 65
End: 2025-02-06
Payer: MEDICARE

## 2025-02-06 VITALS
DIASTOLIC BLOOD PRESSURE: 76 MMHG | HEIGHT: 66 IN | OXYGEN SATURATION: 100 % | RESPIRATION RATE: 16 BRPM | SYSTOLIC BLOOD PRESSURE: 126 MMHG | HEART RATE: 72 BPM | WEIGHT: 185.19 LBS | BODY MASS INDEX: 29.76 KG/M2 | TEMPERATURE: 97 F

## 2025-02-06 DIAGNOSIS — C54.1 PRIMARY SEROUS ADENOCARCINOMA OF ENDOMETRIUM: Primary | ICD-10-CM

## 2025-02-06 LAB — CANCER AG125 SERPL-ACNC: 13 U/ML (ref 0–30)

## 2025-02-06 PROCEDURE — 99999 PR PBB SHADOW E&M-EST. PATIENT-LVL IV: CPT | Mod: PBBFAC,HCNC,, | Performed by: OBSTETRICS & GYNECOLOGY

## 2025-02-06 PROCEDURE — 1159F MED LIST DOCD IN RCRD: CPT | Mod: HCNC,CPTII,S$GLB, | Performed by: OBSTETRICS & GYNECOLOGY

## 2025-02-06 PROCEDURE — 3074F SYST BP LT 130 MM HG: CPT | Mod: HCNC,CPTII,S$GLB, | Performed by: OBSTETRICS & GYNECOLOGY

## 2025-02-06 PROCEDURE — 3008F BODY MASS INDEX DOCD: CPT | Mod: HCNC,CPTII,S$GLB, | Performed by: OBSTETRICS & GYNECOLOGY

## 2025-02-06 PROCEDURE — 3072F LOW RISK FOR RETINOPATHY: CPT | Mod: HCNC,CPTII,S$GLB, | Performed by: OBSTETRICS & GYNECOLOGY

## 2025-02-06 PROCEDURE — 3078F DIAST BP <80 MM HG: CPT | Mod: HCNC,CPTII,S$GLB, | Performed by: OBSTETRICS & GYNECOLOGY

## 2025-02-06 PROCEDURE — 99214 OFFICE O/P EST MOD 30 MIN: CPT | Mod: HCNC,S$GLB,, | Performed by: OBSTETRICS & GYNECOLOGY

## 2025-02-06 PROCEDURE — 3288F FALL RISK ASSESSMENT DOCD: CPT | Mod: HCNC,CPTII,S$GLB, | Performed by: OBSTETRICS & GYNECOLOGY

## 2025-02-06 PROCEDURE — 1101F PT FALLS ASSESS-DOCD LE1/YR: CPT | Mod: HCNC,CPTII,S$GLB, | Performed by: OBSTETRICS & GYNECOLOGY

## 2025-02-06 PROCEDURE — 1126F AMNT PAIN NOTED NONE PRSNT: CPT | Mod: HCNC,CPTII,S$GLB, | Performed by: OBSTETRICS & GYNECOLOGY

## 2025-02-06 NOTE — PROGRESS NOTES
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  Washings negative  ER/OH+  HER2 fish negative  P53+  MMRp     48 pretreatment    Adjuvant chemotherapy with Carboplatin/ Paclitaxel   C1 - 4/25/2024  C2 - 5/20/2024  C3 - 6/13/2024  C4 - 7/3/2024  C5 - 7/25/2024  C6-  8/15/2024    Adjuvant radiation 9/26/2024 at [facility]. Completed 5 fractions vaginal HDR.     CT CAP 10/16/2024 No evidence of disease     Today's visit  Presents today for surveillance visit.   Without complaints.      13  Disease free interval 4 months.     Lab Results   Component Value Date/Time     13 02/05/2025 09:12 AM     21 08/14/2024 08:35 AM     20 07/24/2024 08:38 AM      ________________________________________________________________________________  Referral History  Referred by Breana Avilez NP for new diagnosis of high-grade serous endometrial adenocarcinoma. She presented to her PCP 2/2/24 with complaints of post menopausal bleeding and bulging sensation in pelvic area.     2/7/24 EMB-   High-grade serous carcinoma. ER/OH+, HER2 equivocal, p16+, P53+   HER2 fish negative    2/7/24 Pelvic US-   Uterus is retroflexed or retroverted length 10 cm. Endometrium heterogeneous appears partially cystic thickened up to 3 cm with vascular flow. Right ovary unremarkable in the left is not seen.     She has had SVDx2; denies any previous abdominal or pelvic surgeries. She has a family history of breast ad cervical cancer in her mother. Denies any family history of endometrial or ovarian cancer. She is up to date on Pap screening and denies history of abnormal results. She has never had colon cancer screening; is  "scheduled for colonoscopy in November.      Review of Systems   Constitutional:  Negative for appetite change, chills and fever.   Respiratory:  Negative for cough and shortness of breath.    Cardiovascular:  Negative for chest pain.   Gastrointestinal:  Negative for abdominal distention, abdominal pain, constipation, diarrhea and vomiting.   Genitourinary:  Negative for difficulty urinating, pelvic pain, vaginal bleeding and vaginal discharge.    Skin: Negative.    Hematological:  Negative for adenopathy.   Psychiatric/Behavioral: Negative.       OBJECTIVE     /76 (BP Location: Right arm, Patient Position: Sitting)   Pulse 72   Temp 97.1 °F (36.2 °C) (Temporal)   Resp 16   Ht 5' 6" (1.676 m)   Wt 84 kg (185 lb 3 oz)   SpO2 100%   BMI 29.89 kg/m²     Physical Exam  Constitutional:       Appearance: Normal appearance.   HENT:      Nose: Nose normal.   Cardiovascular:      Rate and Rhythm: Normal rate.   Abdominal:      General: Abdomen is flat.      Palpations: Abdomen is soft.   Musculoskeletal:         General: Normal range of motion.   Neurological:      General: No focal deficit present.      Mental Status: She is alert. Mental status is at baseline.   Skin:     General: Skin is dry.   Psychiatric:         Mood and Affect: Mood normal.         Behavior: Behavior normal.       ASSESSMENT     1. Primary serous adenocarcinoma of endometrium              PLAN     No evidence of disease on today's exam.   Feels well, no new symptoms.    normal and stable.   Disease free interval 4 months from completion of therapy.     Recommend continued surveillance. RTC 3 months with  or sooner if needed.      I spent approximately 30 minutes reviewing the available records and evaluating the patient, out of which over 50% of the time was spent face to face with the patient in counseling and coordinating this patient's care.                    "

## 2025-02-21 ENCOUNTER — OFFICE VISIT (OUTPATIENT)
Dept: OPHTHALMOLOGY | Facility: CLINIC | Age: 65
End: 2025-02-21
Payer: MEDICARE

## 2025-02-21 ENCOUNTER — DOCUMENTATION ONLY (OUTPATIENT)
Dept: OPHTHALMOLOGY | Facility: CLINIC | Age: 65
End: 2025-02-21

## 2025-02-21 DIAGNOSIS — H25.811 COMBINED FORMS OF AGE-RELATED CATARACT OF RIGHT EYE: Primary | ICD-10-CM

## 2025-02-21 DIAGNOSIS — H25.043 POSTERIOR SUBCAPSULAR AGE-RELATED CATARACT OF BOTH EYES: ICD-10-CM

## 2025-02-21 DIAGNOSIS — H25.812 COMBINED FORMS OF AGE-RELATED CATARACT OF LEFT EYE: ICD-10-CM

## 2025-02-21 DIAGNOSIS — H40.1122 PRIMARY OPEN ANGLE GLAUCOMA (POAG) OF LEFT EYE, MODERATE STAGE: ICD-10-CM

## 2025-02-21 DIAGNOSIS — E11.9 TYPE 2 DIABETES MELLITUS WITHOUT RETINOPATHY: ICD-10-CM

## 2025-02-21 DIAGNOSIS — H40.1111 PRIMARY OPEN ANGLE GLAUCOMA (POAG) OF RIGHT EYE, MILD STAGE: ICD-10-CM

## 2025-02-21 RX ORDER — NEPAFENAC 3 MG/ML
1 SUSPENSION/ DROPS OPHTHALMIC DAILY
Qty: 3 ML | Refills: 0 | Status: SHIPPED | OUTPATIENT
Start: 2025-02-21

## 2025-02-21 RX ORDER — PREDNISOLONE ACETATE 10 MG/ML
1 SUSPENSION/ DROPS OPHTHALMIC 4 TIMES DAILY
Qty: 5 ML | Refills: 1 | Status: SHIPPED | OUTPATIENT
Start: 2025-02-21

## 2025-02-21 NOTE — PROGRESS NOTES
HPI     Cataract     Additional comments: Pt reports for cat eval per DNL. Denies any pain or   irritation. Va blurry. Pt states issues with distance, reading, glare, and   driving. Using drops as directed.            Comments    POAG  Latanoprost qhs OU (started 8/02/23)  Timolol 0.5% qam OU (started 10/17/23, IOP 18 at that visit)  Target IOP 17             Last edited by Gianni Flor on 2/21/2025  8:16 AM.            Assessment /Plan     For exam results, see Encounter Report.    Combined forms of age-related cataract of right eye  Primary open angle glaucoma (POAG) of right eye, mild stage  Visually Significant Cataract OD > OS  Patient reports decreased vision consistent with the clinical amount of lenticular opacity. Cataract is visually significant and affects activities of daily living including reading and glare. Risks, benefits, and alternatives to cataract surgery were discussed. Discussion of risks included increased dryness, possibility of infection, need for more surgery, as well as permanent vision loss.The patient expressed a desire to proceed with surgery with the potential for some reasonable degree of visual improvement. Recommended regular use of artificial tears and good lid hygiene to optimize surgical outcome.     Discussed with patient treatment options for glaucoma in conjunction with cataract surgery. Patient is currently treating glaucoma with topical medications and reports significant complaints regarding the tolerability of the medications with respect to cost and irritation. Specifically, the patient complains of redness, irritation, chronic discomfort as well as a financial burden associated with the use of glaucoma medications. Discussed treatment options including ECP, filtering procedures, iStent, canaloplasty, Goniotomy or the continued use of glaucoma medications. Patient desires the undergo Streamline canaloplasty in conjunction with cataract surgery in order to reduce  dependence of glaucoma medications.        Discussed intraocular lens options. Monofocal - Distance. Patient understands that glasses will be generally needed at all times for near vision and often for finer distance correction especially for higher degrees of astigmatism.     Lens style/power: SY60WF +19.5    Final visual acuity may be limited by astigmatism, glaucoma damage, and diabetes    None    Other considerations: None    Dilation: good  Alpha Blockers: None    Following medications prescribed:  Prednisolone   Ilevro      Combined forms of age-related cataract of left eye  Will consider after OD  Primary open angle glaucoma (POAG) of left eye, moderate stage  Doing well, intraocular pressure (IOP) within acceptable range relative to target IOP with no evidence of progression. Continue current treatment. Reviewed importance of continued compliance with treatment and follow up.      Continue current gtts:  Latanoprost one drop in each eye nightly and Timolol one drop both eyes daily    Doing Streamline canaloplasty OD. Consider OS with phaco in future.     Type 2 diabetes mellitus without retinopathy  Last A1c 6.7 . Diabetes controlled with no diabetic retinopathy on dilated exam. Reviewed diabetic eye precautions including excellent blood sugar control, and importance of regular follow up.

## 2025-02-21 NOTE — PROGRESS NOTES
Short Stay Record    Diagnosis: Nuclear Sclerotic Cataract right and Primary Open Angle Glaucoma, mild right    CC: Blurry Vision     HPI:  Pricilla Bower is a 64 y.o. female who presents for evaluation prior to ophthalmic surgery. No current complaints.     Past Medical History:   Diagnosis Date    Anticoagulant long-term use     Diabetes mellitus, type 2 2010    A1c >14% on 1/29/20 & 11% on 3/8/19; Lost to F/U as of 7/2019; to ER for glu >500 at [facility] 1/23/20    Endometrial cancer 02/2024    Hyperlipidemia     Hypertension     Noncompliance with treatment plan     Uncontrolled DM2 due to noncompliance with insulin and follow up; A1c 11% on 3/8/19; Lost to F/U as of 7/2019; to ER for glu >500 at [facility] 1/23/20 -> A1c >14% on 1/29/20 -> rec f/u in 1 wk with FBG log to demontrate understanding of insulin titration    Obesity (BMI 30.0-34.9)     Vitamin D deficiency      Past Surgical History:   Procedure Laterality Date    MAPPING, LYMPH NODE, SENTINEL Bilateral 3/18/2024    Procedure: MAPPING, LYMPH NODE, SENTINEL;  Surgeon: Siria Goldberg MD;  Location: [facility];  Service: OB/GYN;  Laterality: Bilateral;    NO PAST SURGERIES      ROBOT-ASSISTED LAPAROSCOPIC ABDOMINAL HYSTERECTOMY USING DA VIKTORIYA XI N/A 3/18/2024    Procedure: XI ROBOTIC HYSTERECTOMY;  Surgeon: Siria Goldberg MD;  Location: [facility];  Service: OB/GYN;  Laterality: N/A;    ROBOT-ASSISTED LAPAROSCOPIC PELVIC LYMPHADENECTOMY USING DA VIKTORIYA XI Bilateral 3/18/2024    Procedure: XI ROBOTIC LYMPHADENECTOMY, PELVIC;  Surgeon: Siria Goldberg MD;  Location: [facility] OR;  Service: OB/GYN;  Laterality: Bilateral;    ROBOT-ASSISTED LAPAROSCOPIC SALPINGO-OOPHORECTOMY USING DA VIKTORIYA XI Bilateral 3/18/2024    Procedure: XI ROBOTIC SALPINGO-OOPHORECTOMY;  Surgeon: Siria Goldberg MD;  Location: [facility] OR;  Service: OB/GYN;  Laterality: Bilateral;     Social History     Tobacco Use    Smoking status: Never     Passive exposure: Never    Smokeless tobacco: Never    Substance Use Topics    Alcohol use: No     Family History   Problem Relation Name Age of Onset    Breast cancer Mother      Cervical cancer Mother       Review of patient's allergies indicates:   Allergen Reactions    Darvocet a500 [propoxyphene n-acetaminophen] Nausea And Vomiting    Penicillins        Current Medications[1]    Review of Systems:  10 Pt ROS negative except as stated in HPI    Physical Exam:  General Appearance:    A&Ox3, no distress, appears stated age   Head:    Normocephalic, without obvious abnormality, atraumatic   Eyes:    PERRL, EOM's intact   Back:     Symmetric, no curvature   Lungs:     respirations unlabored   Chest Wall:    No tenderness or deformity    Heart:  Abdomen:  Extremities:  Skin:    S1 and S2 present    Soft, non-tender    Extremities normal, atraumatic    Skin color, texture, turgor normal     Patient is stable for ophthalmic surgery under local and MAC.       _             [1]   Current Outpatient Medications:     amLODIPine (NORVASC) 5 MG tablet, Take 1 tablet (5 mg total) by mouth once daily., Disp: 90 tablet, Rfl: 3    aspirin 81 MG Chew, Take 1 tablet (81 mg total) by mouth once daily., Disp: 100 tablet, Rfl: 5    atorvastatin (LIPITOR) 40 MG tablet, Take 1 tablet (40 mg total) by mouth every evening., Disp: 90 tablet, Rfl: 3    betamethasone dipropionate (DIPROLENE) 0.05 % ointment, Apply topically 2 (two) times daily., Disp: 15 g, Rfl: 5    blood sugar diagnostic Strp, To check BG 2 times daily, to use with insurance preferred meter, Disp: 100 each, Rfl: 3    blood-glucose meter kit, To check BG 2 times daily, to use with insurance preferred meter/Accu-Check Guide ME, Disp: 1 each, Rfl: 0    cyclobenzaprine (FLEXERIL) 5 MG tablet, Take 1 tablet (5 mg total) by mouth 2 (two) times daily as needed (muscle pain). Will cause drowsiness, Disp: 20 tablet, Rfl: 1    diclofenac (VOLTAREN) 50 MG EC tablet, Take 1 tablet (50 mg total) by mouth 2 (two) times daily as needed  "(pain). Take with food, Disp: 30 tablet, Rfl: 0    ibuprofen (ADVIL,MOTRIN) 600 MG tablet, Take 1 tablet (600 mg total) by mouth every 8 (eight) hours as needed for Pain., Disp: 30 tablet, Rfl: 0    ketoconazole (NIZORAL) 2 % cream, Apply topically 2 (two) times daily. Continue use for 1 week after resolution of fungal rash., Disp: 30 g, Rfl: 1    ketoconazole (NIZORAL) 2 % shampoo, Apply topically twice a week., Disp: 120 mL, Rfl: 5    lancets [facility], To check BG 2 times daily, to use with insurance preferred meter, Disp: 100 each, Rfl: 3    latanoprost 0.005 % ophthalmic solution, Place 1 drop into both eyes every evening., Disp: 2.5 mL, Rfl: 11    metFORMIN (GLUCOPHAGE) 1000 MG tablet, Take 1 tablet (1,000 mg total) by mouth daily with breakfast., Disp: 90 tablet, Rfl: 3    MOUNJARO 2.5 mg/0.5 mL PnIj, Inject 2.5 mg into the skin every Thursday., Disp: 4 Pen, Rfl: 5    nepafenac (ILEVRO) 0.3 % DrpS, Apply 1 drop to eye once daily., Disp: 3 mL, Rfl: 0    pen needle, diabetic 32 gauge x 5/32" Ndle, 1 each by Misc.(Non-Drug; Combo Route) route every evening., Disp: 30 each, Rfl: 2    prednisoLONE acetate (PRED FORTE) 1 % DrpS, Place 1 drop into the right eye 4 (four) times daily., Disp: 5 mL, Rfl: 1    timolol maleate 0.5% (TIMOPTIC) 0.5 % Drop, Place 1 drop into both eyes every morning., Disp: 5 mL, Rfl: 11    "

## 2025-03-13 NOTE — TELEPHONE ENCOUNTER
----- Message from Vasiliyjuliannagarry sent at 3/13/2025  1:39 PM CDT -----  Contact: [phone]  Type:  RX Refill RequestWho Called: TREY HIRSCH [[id number]]Refill or New Rx:refillRX Name and Strength:MOUNJARO 2.5 mg/0.5 mL PnIj/ atorvastatin (LIPITOR) 40 MG tabletHow is the patient currently taking it? (ex. 1XDay):Is this a 30 day or 90 day RX:Preferred Pharmacy with phone number:Local or Mail Order:localOrdering Provider:Louis the patient rather a call back or a response via [facility]? [facility] Call Back Number: [phone] Information: mrn [id number] [address] [place], [place] [id number] [address] [address]Afhme: [phone] Fax: [phone]

## 2025-03-14 RX ORDER — TIRZEPATIDE 2.5 MG/.5ML
2.5 INJECTION, SOLUTION SUBCUTANEOUS
Qty: 4 PEN | Refills: 5 | Status: SHIPPED | OUTPATIENT
Start: 2025-03-20

## 2025-03-24 PROBLEM — E11.69 MIXED DIABETIC HYPERLIPIDEMIA ASSOCIATED WITH TYPE 2 DIABETES MELLITUS: Status: ACTIVE | Noted: 2017-09-19

## 2025-03-24 PROBLEM — E11.9 DIABETES MELLITUS TYPE 2 WITHOUT RETINOPATHY: Status: ACTIVE | Noted: 2025-03-24

## 2025-03-24 PROBLEM — E11.36 DIABETIC CATARACT: Status: ACTIVE | Noted: 2025-03-24

## 2025-03-24 PROBLEM — R79.89 LOW VITAMIN D LEVEL: Status: ACTIVE | Noted: 2025-03-24

## 2025-05-07 ENCOUNTER — TELEPHONE (OUTPATIENT)
Dept: INTERNAL MEDICINE | Facility: CLINIC | Age: 65
End: 2025-05-07
Payer: MEDICARE

## 2025-05-07 NOTE — TELEPHONE ENCOUNTER
I tried calling about her missed appt today with Dolores Kelly NP and there was no answer and her vm was full. I sent a portal message. //kah

## 2025-05-13 ENCOUNTER — PATIENT OUTREACH (OUTPATIENT)
Dept: ADMINISTRATIVE | Facility: HOSPITAL | Age: 65
End: 2025-05-13
Payer: MEDICARE

## 2025-05-13 DIAGNOSIS — E11.8 DIABETES MELLITUS TYPE 2 WITH COMPLICATIONS: Primary | ICD-10-CM

## 2025-05-13 NOTE — PROGRESS NOTES
Working DM Lab Report.     Pt has lab appt scheduled 5/14/25.  A1C ordered and linked to appt.

## 2025-05-14 ENCOUNTER — LAB VISIT (OUTPATIENT)
Dept: LAB | Facility: HOSPITAL | Age: 65
End: 2025-05-14
Attending: OBSTETRICS & GYNECOLOGY
Payer: MEDICARE

## 2025-05-14 DIAGNOSIS — C54.1 ENDOMETRIAL CANCER: ICD-10-CM

## 2025-05-14 DIAGNOSIS — Z51.11 ENCOUNTER FOR CHEMOTHERAPY MANAGEMENT: ICD-10-CM

## 2025-05-14 DIAGNOSIS — E11.8 DIABETES MELLITUS TYPE 2 WITH COMPLICATIONS: ICD-10-CM

## 2025-05-14 LAB
ABSOLUTE NEUTROPHIL COUNT (OHS): 2.69 K/UL (ref 1.8–7.7)
ALBUMIN SERPL BCP-MCNC: 3.7 G/DL (ref 3.5–5.2)
ALP SERPL-CCNC: 69 UNIT/L (ref 40–150)
ALT SERPL W/O P-5'-P-CCNC: 23 UNIT/L (ref 10–44)
ANION GAP (OHS): 8 MMOL/L (ref 8–16)
AST SERPL-CCNC: 20 UNIT/L (ref 11–45)
BILIRUB SERPL-MCNC: 0.3 MG/DL (ref 0.1–1)
BUN SERPL-MCNC: 16 MG/DL (ref 8–23)
CALCIUM SERPL-MCNC: 8.9 MG/DL (ref 8.7–10.5)
CHLORIDE SERPL-SCNC: 106 MMOL/L (ref 95–110)
CO2 SERPL-SCNC: 25 MMOL/L (ref 23–29)
CREAT SERPL-MCNC: 0.7 MG/DL (ref 0.5–1.4)
ERYTHROCYTE [DISTWIDTH] IN BLOOD BY AUTOMATED COUNT: 14.2 % (ref 11.5–14.5)
GFR SERPLBLD CREATININE-BSD FMLA CKD-EPI: >60 ML/MIN/1.73/M2
GLUCOSE SERPL-MCNC: 153 MG/DL (ref 70–110)
HCT VFR BLD AUTO: 37.6 % (ref 37–48.5)
HGB BLD-MCNC: 12.2 GM/DL (ref 12–16)
IMM GRANULOCYTES # BLD AUTO: 0.01 K/UL (ref 0–0.04)
MCH RBC QN AUTO: 29.6 PG (ref 27–31)
MCHC RBC AUTO-ENTMCNC: 32.4 G/DL (ref 32–36)
MCV RBC AUTO: 91 FL (ref 82–98)
PLATELET # BLD AUTO: 266 K/UL (ref 150–450)
PMV BLD AUTO: 11 FL (ref 9.2–12.9)
POTASSIUM SERPL-SCNC: 3.7 MMOL/L (ref 3.5–5.1)
PROT SERPL-MCNC: 6.9 GM/DL (ref 6–8.4)
RBC # BLD AUTO: 4.12 M/UL (ref 4–5.4)
SODIUM SERPL-SCNC: 139 MMOL/L (ref 136–145)
WBC # BLD AUTO: 4.44 K/UL (ref 3.9–12.7)

## 2025-05-14 PROCEDURE — 86304 IMMUNOASSAY TUMOR CA 125: CPT | Mod: HCNC

## 2025-05-14 PROCEDURE — 85027 COMPLETE CBC AUTOMATED: CPT | Mod: HCNC

## 2025-05-14 PROCEDURE — 36415 COLL VENOUS BLD VENIPUNCTURE: CPT | Mod: HCNC,PN

## 2025-05-14 PROCEDURE — 82040 ASSAY OF SERUM ALBUMIN: CPT | Mod: HCNC

## 2025-05-14 PROCEDURE — 83036 HEMOGLOBIN GLYCOSYLATED A1C: CPT | Mod: HCNC

## 2025-05-15 ENCOUNTER — RESULTS FOLLOW-UP (OUTPATIENT)
Dept: INTERNAL MEDICINE | Facility: CLINIC | Age: 65
End: 2025-05-15

## 2025-05-15 LAB
CANCER AG125 SERPL-ACNC: 14 UNIT/ML
EAG (OHS): 140 MG/DL (ref 68–131)
HBA1C MFR BLD: 6.5 % (ref 4–5.6)

## 2025-05-22 ENCOUNTER — OFFICE VISIT (OUTPATIENT)
Dept: OPHTHALMOLOGY | Facility: CLINIC | Age: 65
End: 2025-05-22
Payer: MEDICARE

## 2025-05-22 DIAGNOSIS — H40.1122 PRIMARY OPEN ANGLE GLAUCOMA (POAG) OF LEFT EYE, MODERATE STAGE: ICD-10-CM

## 2025-05-22 DIAGNOSIS — H25.811 COMBINED FORMS OF AGE-RELATED CATARACT OF RIGHT EYE: Primary | ICD-10-CM

## 2025-05-22 DIAGNOSIS — H40.1111 PRIMARY OPEN ANGLE GLAUCOMA (POAG) OF RIGHT EYE, MILD STAGE: ICD-10-CM

## 2025-05-22 DIAGNOSIS — E11.9 TYPE 2 DIABETES MELLITUS WITHOUT RETINOPATHY: ICD-10-CM

## 2025-05-22 DIAGNOSIS — H25.812 COMBINED FORMS OF AGE-RELATED CATARACT OF LEFT EYE: ICD-10-CM

## 2025-05-22 PROCEDURE — 99499 UNLISTED E&M SERVICE: CPT | Mod: HCNC,S$GLB,, | Performed by: OPHTHALMOLOGY

## 2025-05-22 PROCEDURE — 99999 PR PBB SHADOW E&M-EST. PATIENT-LVL III: CPT | Mod: PBBFAC,HCNC,, | Performed by: OPHTHALMOLOGY

## 2025-05-22 NOTE — PROGRESS NOTES
HPI     Follow-up     Additional comments: Patient here today for DFE prior to surgery  Vision blurred  Not using drops           Comments    1. DM 2013  2. POAG, Target IOP 17  3. Cataract OU    Latanoprost qhs OU (started 8/02/23)(Not using)  Timolol 0.5% qam OU (started 10/17/23, IOP 18 at that visit) (Not using)            Last edited by Mary Barrientos, PCT on 5/22/2025 10:44 AM.            Assessment /Plan     For exam results, see Encounter Report.    Combined forms of age-related cataract of right eye  Primary open angle glaucoma (POAG) of right eye, mild stage  Cleared for surgery. Will proceed with Phaco OD w/ Streamline canaloplasty    Combined forms of age-related cataract of left eye  Primary open angle glaucoma (POAG) of left eye, moderate stage  Consider OS with phaco in future.     Type 2 diabetes mellitus without retinopathy  Last A1c 6.5 . Diabetes controlled with no diabetic retinopathy on dilated exam. Reviewed diabetic eye precautions including excellent blood sugar control, and importance of regular follow up.

## 2025-06-18 ENCOUNTER — TELEPHONE (OUTPATIENT)
Dept: GYNECOLOGIC ONCOLOGY | Facility: CLINIC | Age: 65
End: 2025-06-18
Payer: MEDICARE

## 2025-06-25 ENCOUNTER — TELEPHONE (OUTPATIENT)
Dept: INTERNAL MEDICINE | Facility: CLINIC | Age: 65
End: 2025-06-25
Payer: MEDICARE

## 2025-06-26 ENCOUNTER — OFFICE VISIT (OUTPATIENT)
Dept: GYNECOLOGIC ONCOLOGY | Facility: CLINIC | Age: 65
End: 2025-06-26
Payer: MEDICARE

## 2025-06-26 VITALS
BODY MASS INDEX: 29.58 KG/M2 | WEIGHT: 184.06 LBS | DIASTOLIC BLOOD PRESSURE: 68 MMHG | HEART RATE: 79 BPM | TEMPERATURE: 97 F | SYSTOLIC BLOOD PRESSURE: 129 MMHG | HEIGHT: 66 IN | OXYGEN SATURATION: 100 %

## 2025-06-26 DIAGNOSIS — Z85.42 HISTORY OF ENDOMETRIAL CANCER: Primary | ICD-10-CM

## 2025-06-26 DIAGNOSIS — E11.00 TYPE 2 DIABETES MELLITUS WITH HYPEROSMOLARITY WITHOUT COMA, WITHOUT LONG-TERM CURRENT USE OF INSULIN: ICD-10-CM

## 2025-06-26 PROCEDURE — 99999 PR PBB SHADOW E&M-EST. PATIENT-LVL IV: CPT | Mod: PBBFAC,HCNC,, | Performed by: OBSTETRICS & GYNECOLOGY

## 2025-06-26 RX ORDER — TIRZEPATIDE 2.5 MG/.5ML
2.5 INJECTION, SOLUTION SUBCUTANEOUS
Qty: 4 PEN | Refills: 5 | Status: SHIPPED | OUTPATIENT
Start: 2025-06-26

## 2025-06-26 RX ORDER — METFORMIN HYDROCHLORIDE 1000 MG/1
1000 TABLET ORAL
Qty: 90 TABLET | Refills: 3 | Status: SHIPPED | OUTPATIENT
Start: 2025-06-26

## 2025-06-26 NOTE — PROGRESS NOTES
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  Washings negative  ER/KY+  HER2 fish negative  P53+  MMRp     48 pretreatment    Adjuvant chemotherapy with Carboplatin/ Paclitaxel   C1 - 4/25/2024  C2 - 5/20/2024  C3 - 6/13/2024  C4 - 7/3/2024  C5 - 7/25/2024  C6-  8/15/2024    Adjuvant radiation 9/26/2024 at [facility]. Completed 5 fractions vaginal HDR.     CT CAP 10/16/2024 No evidence of disease     Today's visit  Presents today for surveillance visit.   Without complaints.      14  Disease free interval 19 months.     Lab Results   Component Value Date/Time     14.0 05/14/2025 09:44 AM     13 02/05/2025 09:12 AM     21 08/14/2024 08:35 AM     20 07/24/2024 08:38 AM      ________________________________________________________________________________  Referral History  Referred by Breana Avilez NP for new diagnosis of high-grade serous endometrial adenocarcinoma. She presented to her PCP 2/2/24 with complaints of post menopausal bleeding and bulging sensation in pelvic area.     2/7/24 EMB-   High-grade serous carcinoma. ER/KY+, HER2 equivocal, p16+, P53+   HER2 fish negative    2/7/24 Pelvic US-   Uterus is retroflexed or retroverted length 10 cm. Endometrium heterogeneous appears partially cystic thickened up to 3 cm with vascular flow. Right ovary unremarkable in the left is not seen.     She has had SVDx2; denies any previous abdominal or pelvic surgeries. She has a family history of breast ad cervical cancer in her mother. Denies any family history of endometrial or ovarian cancer. She is up to date on Pap screening and denies history of abnormal results. She has never had colon  "cancer screening; is scheduled for colonoscopy in November.      Review of Systems   Constitutional:  Negative for appetite change, chills and fever.   Respiratory:  Negative for cough and shortness of breath.    Cardiovascular:  Negative for chest pain.   Gastrointestinal:  Negative for abdominal distention, abdominal pain, constipation, diarrhea and vomiting.   Genitourinary:  Negative for difficulty urinating, pelvic pain, vaginal bleeding and vaginal discharge.    Skin: Negative.    Hematological:  Negative for adenopathy.   Psychiatric/Behavioral: Negative.       OBJECTIVE     /68 (BP Location: Left arm, Patient Position: Sitting)   Pulse 79   Temp 97.1 °F (36.2 °C) (Temporal)   Ht 5' 6" (1.676 m)   Wt 83.5 kg (184 lb 1.4 oz)   SpO2 100%   BMI 29.71 kg/m²     Physical Exam  Constitutional:       Appearance: Normal appearance.     Genitourinary:    Vagina normal.      Pelvic exam was performed with patient lithotomy exam.      Exam Position Comments:  A female staff member was present to chaperone during the pelvic exam.  . There is no lesion on the right labia. There is no lesion on the left labia. There is no adenopathy on the right inguinal canal.There is no adenopathy on the left inguinal canal.Right adnexum displays no mass. Left adnexum displays no mass. Cervix is absent.Uterus is absent.   HENT:      Nose: Nose normal.   Cardiovascular:      Rate and Rhythm: Normal rate.   Abdominal:      General: Abdomen is flat.      Palpations: Abdomen is soft.   Musculoskeletal:         General: Normal range of motion.   Lymphadenopathy:      Lower Body: No right inguinal adenopathy. No left inguinal adenopathy.   Neurological:      General: No focal deficit present.      Mental Status: She is alert. Mental status is at baseline.   Skin:     General: Skin is dry.   Psychiatric:         Mood and Affect: Mood normal.         Behavior: Behavior normal.       ASSESSMENT     1. History of endometrial cancer  - " CT Chest Abdomen Pelvis With IV Contrast (XPD) Routine Oral Contrast; Standing  - ; Future  - Creatinine, serum; Future      PLAN     No evidence of disease on today's exam.   Feels well, no new symptoms.    normal and stable.   Disease free interval 9 months from completion of therapy.   Will obtain surveillance imaging.    Recommend continued surveillance. RTC 3 months with  and imaging or sooner if needed.      I spent approximately 30 minutes reviewing the available records and evaluating the patient, out of which over 50% of the time was spent face to face with the patient in counseling and coordinating this patient's care.

## 2025-07-16 ENCOUNTER — PATIENT OUTREACH (OUTPATIENT)
Dept: ADMINISTRATIVE | Facility: HOSPITAL | Age: 65
End: 2025-07-16
Payer: MEDICARE

## 2025-07-16 NOTE — PROGRESS NOTES
Working BR COLON REPORT: Chart searched, Attempted to contact patient, no answer, left voicemail.

## 2025-07-18 ENCOUNTER — TELEPHONE (OUTPATIENT)
Dept: GYNECOLOGIC ONCOLOGY | Facility: CLINIC | Age: 65
End: 2025-07-18
Payer: MEDICARE

## 2025-07-18 DIAGNOSIS — Z85.42 HISTORY OF ENDOMETRIAL CANCER: Primary | ICD-10-CM

## 2025-07-22 ENCOUNTER — HOSPITAL ENCOUNTER (OUTPATIENT)
Dept: RADIOLOGY | Facility: HOSPITAL | Age: 65
Discharge: HOME | End: 2025-07-22
Attending: OBSTETRICS & GYNECOLOGY
Payer: MEDICARE

## 2025-07-22 DIAGNOSIS — Z85.42 HISTORY OF ENDOMETRIAL CANCER: ICD-10-CM

## 2025-07-22 PROCEDURE — 71260 CT THORAX DX C+: CPT | Mod: TC,HCNC

## 2025-07-22 PROCEDURE — A9698 NON-RAD CONTRAST MATERIALNOC: HCPCS | Mod: HCNC | Performed by: OBSTETRICS & GYNECOLOGY

## 2025-07-22 PROCEDURE — 25500020 PHARM REV CODE 255: Mod: HCNC | Performed by: OBSTETRICS & GYNECOLOGY

## 2025-07-22 RX ADMIN — IOHEXOL 100 ML: 350 INJECTION, SOLUTION INTRAVENOUS at 12:07

## 2025-07-22 RX ADMIN — IOHEXOL 1000 ML: 12 SOLUTION ORAL at 11:07

## 2025-07-23 ENCOUNTER — TELEPHONE (OUTPATIENT)
Dept: GYNECOLOGIC ONCOLOGY | Facility: CLINIC | Age: 65
End: 2025-07-23
Payer: MEDICARE

## 2025-07-23 ENCOUNTER — PATIENT MESSAGE (OUTPATIENT)
Dept: GYNECOLOGIC ONCOLOGY | Facility: CLINIC | Age: 65
End: 2025-07-23
Payer: MEDICARE

## 2025-07-23 NOTE — TELEPHONE ENCOUNTER
Left another message. Will message through the portal  Yamilka Jorge, FNP-C, AOP  Gynecologic Oncology     ----- Message from Siria Goldberg MD sent at 7/23/2025  8:25 AM CDT -----  Please let her know CT looks good.   ----- Message -----  From: Interface, Rad Results In  Sent: 7/22/2025   1:12 PM CDT  To: Siria Goldberg MD

## 2025-07-23 NOTE — TELEPHONE ENCOUNTER
----- Message from Siria Goldberg MD sent at 7/23/2025  8:25 AM CDT -----  Please let her know CT looks good.   ----- Message -----  From: Shruthi, Rad Results In  Sent: 7/22/2025   1:12 PM CDT  To: Siria Goldberg MD

## 2025-07-30 DIAGNOSIS — E11.9 TYPE 2 DIABETES MELLITUS WITHOUT COMPLICATION: ICD-10-CM

## 2025-08-07 ENCOUNTER — PATIENT OUTREACH (OUTPATIENT)
Dept: ADMINISTRATIVE | Facility: HOSPITAL | Age: 65
End: 2025-08-07
Payer: MEDICARE

## 2025-08-07 NOTE — PROGRESS NOTES
Working BR [employer] KED July REPORT: Lab appt already scheduled on 9/24/2025, Microalbumin and Lipid panel are due, linked orders to lab visit.

## 2025-08-19 DIAGNOSIS — E78.5 HYPERLIPIDEMIA, UNSPECIFIED HYPERLIPIDEMIA TYPE: ICD-10-CM

## 2025-08-19 DIAGNOSIS — E78.2 MIXED HYPERLIPIDEMIA: ICD-10-CM

## 2025-08-20 ENCOUNTER — TELEPHONE (OUTPATIENT)
Dept: OPHTHALMOLOGY | Facility: CLINIC | Age: 65
End: 2025-08-20
Payer: MEDICARE

## 2025-08-21 RX ORDER — ATORVASTATIN CALCIUM 40 MG/1
40 TABLET, FILM COATED ORAL NIGHTLY
Qty: 90 TABLET | Refills: 3 | Status: SHIPPED | OUTPATIENT
Start: 2025-08-21

## 2025-08-28 ENCOUNTER — TELEPHONE (OUTPATIENT)
Dept: OPHTHALMOLOGY | Facility: CLINIC | Age: 65
End: 2025-08-28
Payer: MEDICARE